# Patient Record
Sex: MALE | Race: WHITE | Employment: FULL TIME | URBAN - METROPOLITAN AREA
[De-identification: names, ages, dates, MRNs, and addresses within clinical notes are randomized per-mention and may not be internally consistent; named-entity substitution may affect disease eponyms.]

---

## 2017-01-21 DIAGNOSIS — E78.5 HYPERLIPIDEMIA, UNSPECIFIED HYPERLIPIDEMIA TYPE: ICD-10-CM

## 2017-01-21 RX ORDER — ATORVASTATIN CALCIUM 10 MG/1
TABLET, FILM COATED ORAL
Qty: 30 TAB | Refills: 3 | Status: SHIPPED | OUTPATIENT
Start: 2017-01-21 | End: 2017-04-10 | Stop reason: SDUPTHER

## 2017-02-01 ENCOUNTER — OFFICE VISIT (OUTPATIENT)
Dept: INTERNAL MEDICINE CLINIC | Age: 57
End: 2017-02-01

## 2017-02-01 VITALS
TEMPERATURE: 98.1 F | RESPIRATION RATE: 16 BRPM | HEIGHT: 68 IN | HEART RATE: 72 BPM | BODY MASS INDEX: 29.49 KG/M2 | DIASTOLIC BLOOD PRESSURE: 78 MMHG | OXYGEN SATURATION: 100 % | SYSTOLIC BLOOD PRESSURE: 120 MMHG | WEIGHT: 194.6 LBS

## 2017-02-01 DIAGNOSIS — Z11.3 SCREENING FOR STD (SEXUALLY TRANSMITTED DISEASE): ICD-10-CM

## 2017-02-01 DIAGNOSIS — E78.1 PURE HYPERGLYCERIDEMIA: Primary | ICD-10-CM

## 2017-02-01 DIAGNOSIS — L65.9 HAIR LOSS: ICD-10-CM

## 2017-02-01 DIAGNOSIS — L01.00 IMPETIGO: ICD-10-CM

## 2017-02-01 DIAGNOSIS — R21 RASH: ICD-10-CM

## 2017-02-01 DIAGNOSIS — R68.82 DECREASED LIBIDO: ICD-10-CM

## 2017-02-01 DIAGNOSIS — Z11.59 NEED FOR HEPATITIS C SCREENING TEST: ICD-10-CM

## 2017-02-01 DIAGNOSIS — F41.9 ANXIETY: ICD-10-CM

## 2017-02-01 RX ORDER — MUPIROCIN 20 MG/G
OINTMENT TOPICAL DAILY
Qty: 22 G | Refills: 0 | Status: SHIPPED | OUTPATIENT
Start: 2017-02-01 | End: 2017-08-09

## 2017-02-01 RX ORDER — TRIAMCINOLONE ACETONIDE 0.25 MG/G
CREAM TOPICAL 2 TIMES DAILY
Qty: 454 G | Refills: 0 | Status: SHIPPED | OUTPATIENT
Start: 2017-02-01 | End: 2017-02-11

## 2017-02-01 RX ORDER — FINASTERIDE 5 MG/1
TABLET, FILM COATED ORAL
Qty: 30 TAB | Refills: 1 | Status: SHIPPED | OUTPATIENT
Start: 2017-02-01 | End: 2017-08-09

## 2017-02-01 RX ORDER — SILDENAFIL 100 MG/1
100 TABLET, FILM COATED ORAL AS NEEDED
Qty: 8 TAB | Refills: 2 | Status: SHIPPED | OUTPATIENT
Start: 2017-02-01 | End: 2017-06-17 | Stop reason: SDUPTHER

## 2017-02-01 NOTE — PATIENT INSTRUCTIONS
It was a pleasure to see you! As discussed:    I have ordered your age appropriate labs please complete them. You will need to fast 10-12hrs before your appointment. Congratulations on your plans to start weight loss and positive lifestyle changes!!! Continue to work on optimizing your weight (goal lose at least 5% body weight), healthy eating and cardiovascular disease (Recommendation: reduce carbs to 150-200g/ day and the Mediterranean Diet). Mediterranean Diet: Care Instructions  Your Care Instructions  The Mediterranean diet features foods eaten in [place], [place], [place], and other countries that border the [facility]. It emphasizes eating a diet rich in fruits, vegetables, nuts, and high-fiber grains, and limits meat, cheese, and sweets. The Mediterranean diet may:  · Prevent heart disease and lower the risk of a heart attack or stroke. · Prevent type 2 diabetes. · Prevent Alzheimer's disease and other dementia. · Prevent depression. · Prevent Parkinson's disease. This diet contains more fat than other heart-healthy diets. But the fats are mainly from nuts, unsaturated oils, such as fish oils, olive oil, and certain nut or seed oils (such as canola, soybean, or flaxseed oil). These types of oils may help protect the heart and blood vessels. Follow-up care is a key part of your treatment and safety. Be sure to make and go to all appointments, and call your doctor if you are having problems. It's also a good idea to know your test results and keep a list of the medicines you take. How can you care for yourself at home? What to eat  · Eat a variety of fruits and vegetables each day, such as grapes, blueberries, tomatoes, broccoli, peppers, figs, olives, spinach, eggplant, beans, lentils, and chickpeas. · Eat a variety of whole-grain foods each day, such as oats, brown rice, and whole wheat bread, pasta, and couscous. · Eat fish at least 2 times a week.  Try tuna, salmon, mackerel, lake trout, herring, or sardines. · Eat moderate amounts of low-fat dairy products each day or weekly, such as milk, cheese, or yogurt. · Eat moderate amounts of poultry and eggs every 2 days or weekly. · Choose healthy (unsaturated) fats, such as nuts, olive oil and certain nut or seed oils like canola, soybean, and flaxseed. · Limit unhealthy (saturated) fats, such as butter, palm oil, and coconut oil. And limit fats found in animal products, such as meat and dairy products made with whole milk. Try to eat red meat only a few times a month in very small amounts. · Limit sweets and desserts to only a few times a week. This includes sugar-sweetened drinks like soda. The Mediterranean diet may also include red wine with your meal--1 glass each day for women and up to 2 glasses a day for men. Tips for changing your diet  · Dip bread in a mix of olive oil and fresh herbs instead of using butter. · Add avocado slices to your sandwich instead of seth. · Have fish for lunch or dinner instead of red meat. Brush the fish with olive oil, and broil or grill it. · Sprinkle your salad with seeds or nuts instead of cheese. · Cook with olive or canola oil instead of butter or oils that are high in saturated fat. · Switch from 2% milk or whole milk to 1% or fat-free milk. · Dip raw vegetables in a vinaigrette dressing or hummus instead of dips made from mayonnaise or sour cream.  · Have a piece of fruit for dessert instead of a piece of cake. Try baked apples, or have some dried fruit. Part of the Mediterranean diet is being active. Get at least 30 minutes of exercise on most days of the week. Walking is a good choice. You also may want to do other activities, such as running, swimming, cycling, or playing tennis or team sports. Where can you learn more? Go to http://sami-alex.info/.   Enter O407 in the search box to learn more about \"Mediterranean Diet: Care Instructions. \"  Current as of: July 26, 2016  Content Version: 11.1  © 9899-5296 [employer], [facility]. Care instructions adapted under license by [employer] (which disclaims liability or warranty for this information). If you have questions about a medical condition or this instruction, always ask your healthcare professional. [place] [address] any warranty or liability for your use of this information.

## 2017-02-01 NOTE — PROGRESS NOTES
HISTORY OF PRESENT ILLNESS  Walter Edwards is a 64 y.o. male. HPI  Cardiovascular Review:  The patient has hyperlipidemia. Diet and Lifestyle: generally follows a low fat low cholesterol diet, exercises regularly, nonsmoker  Home BP Monitoring: is not measured at home. Pertinent ROS: taking medications as instructed, no medication side effects noted, no TIA's, no chest pain on exertion, no dyspnea on exertion, no swelling of ankles. Low Libido   Reports several years of decreased libido. Hair loss on legs. Denies s/s of claudications   PHQ 2 / 9, over the last two weeks 2/1/2017   Little interest or pleasure in doing things Several days   Feeling down, depressed or hopeless Several days   Total Score PHQ 2 2       Review of Systems   Constitutional: Negative for diaphoresis, fever and weight loss. Eyes: Negative for blurred vision and pain. Respiratory: Negative for shortness of breath. Cardiovascular: Negative for chest pain, orthopnea and leg swelling. Genitourinary:        New sexual partner would like HIV check    Skin: Positive for rash (pruritic on trunk ). Neurological: Negative for focal weakness and headaches. Psychiatric/Behavioral: Negative for depression. Patient Active Problem List    Diagnosis Date Noted    NEETU (obstructive sleep apnea) 03/29/2016    Anxiety 12/16/2014    HLD (hyperlipidemia) 12/16/2014    GERD (gastroesophageal reflux disease) 12/16/2014    PND (post-nasal drip) 12/16/2014       Current Outpatient Prescriptions   Medication Sig Dispense Refill    sildenafil citrate (VIAGRA) 100 mg tablet Take 1 Tab by mouth as needed. 8 Tab 2    finasteride (PROSCAR) 5 mg tablet 1/4 pill daily 30 Tab 1    triamcinolone acetonide (KENALOG) 0.025 % topical cream Apply  to affected area two (2) times a day for 10 days. use thin layer 454 g 0    mupirocin (BACTROBAN) 2 % ointment Apply  to affected area daily.  22 g 0    atorvastatin (LIPITOR) 10 mg tablet TAKE 1 TAB BY MOUTH NIGHTLY. 30 Tab 3    venlafaxine-SR (EFFEXOR-XR) 75 mg capsule TAKE 1 CAPSULES BY MOUTH EVERY DAY 90 Cap 2    omeprazole (PRILOSEC) 40 mg capsule Take 40 mg by mouth daily.  cetirizine (ZYRTEC) 10 mg tablet Take 10 mg by mouth daily.  albuterol (PROVENTIL HFA, VENTOLIN HFA, PROAIR HFA) 90 mcg/actuation inhaler Take 1 Puff by inhalation every six (6) hours as needed for Wheezing. 1 Inhaler 4       Allergies   Allergen Reactions    Morphine Itching    Pcn [Penicillins] Itching      Visit Vitals    /78 (BP 1 Location: Right arm, BP Patient Position: Sitting)    Pulse 72    Temp 98.1 °F (36.7 °C) (Oral)    Resp 16    Ht 5' 8\" (1.727 m)    Wt 194 lb 9.6 oz (88.3 kg)    SpO2 100%    BMI 29.59 kg/m2       Physical Exam   Constitutional: He is oriented to person, place, and time. No distress. HENT:   Right Ear: Tympanic membrane, external ear and ear canal normal.   Left Ear: Tympanic membrane, external ear and ear canal normal.   Nose: Mucosal edema and rhinorrhea present. Mouth/Throat: Posterior oropharyngeal erythema present. No oropharyngeal exudate. Cardiovascular: Normal rate, regular rhythm and normal heart sounds. Exam reveals no friction rub. No murmur heard. Pulses:       Posterior tibial pulses are 2+ on the right side, and 2+ on the left side. Pulmonary/Chest: Breath sounds normal. No accessory muscle usage. No tachypnea and no bradypnea. No respiratory distress. He has no decreased breath sounds. He has no wheezes. He has no rhonchi. He has no rales. Musculoskeletal: He exhibits no edema. Neurological: He is alert and oriented to person, place, and time. Skin: He is not diaphoretic. ASSESSMENT and PLAN  Pat Kirk was seen today for hypertension. Diagnoses and all orders for this visit:    Pure hyperglyceridemia- check Lipid panel    Anxiety- stable. Continue current dose of Effexor    Rash- contact dermatitis.  Trial of steroid cream and avoidance -     triamcinolone acetonide (KENALOG) 0.025 % topical cream; Apply  to affected area two (2) times a day for 10 days. use thin layer    Screening for STD (sexually transmitted disease)- per request  -     HIV 1/2 AG/AB, 4TH GENERATION,W RFLX CONFIRM  -     RPR    Impetigo- topical ointment.   -     mupirocin (BACTROBAN) 2 % ointment; Apply  to affected area daily. Need for hepatitis C screening test  -     HEPATITIS C AB    Decreased libido- check Ttrone   -     sildenafil citrate (VIAGRA) 100 mg tablet; Take 1 Tab by mouth as needed. -     finasteride (PROSCAR) 5 mg tablet; 1/4 pill daily  -     TESTOSTERONE, FREE & TOTAL    Hair loss- no e/o PVD. Likely genetic.   -     finasteride (PROSCAR) 5 mg tablet; 1/4 pill daily  -     TESTOSTERONE, FREE & TOTAL      Follow-up Disposition:  Return in about 4 months (around 6/1/2017) for Physical - 30 minute appointment. Medication risks/benefits/costs/interactions/alternatives discussed with patient. Obiefabiana Rodriguez  was given an after visit summary which includes diagnoses, current medications, & vitals. he expressed understanding with the diagnosis and plan.

## 2017-02-02 LAB
CHOLEST SERPL-MCNC: 137 MG/DL (ref 100–199)
COMMENT: ABNORMAL
HCV AB S/CO SERPL IA: <0.1 S/CO RATIO (ref 0–0.9)
HDLC SERPL-MCNC: 25 MG/DL
HIV 1+2 AB+HIV1 P24 AG SERPL QL IA: NON REACTIVE
LDLC SERPL CALC-MCNC: ABNORMAL MG/DL (ref 0–99)
PSA SERPL-MCNC: 1.7 NG/ML (ref 0–4)
REFLEX CRITERIA: NORMAL
RPR SER QL: NON REACTIVE
TESTOST FREE SERPL-MCNC: 8.8 PG/ML (ref 7.2–24)
TESTOST SERPL-MCNC: 261 NG/DL (ref 348–1197)
TRIGL SERPL-MCNC: 421 MG/DL (ref 0–149)
VLDLC SERPL CALC-MCNC: ABNORMAL MG/DL (ref 5–40)

## 2017-02-03 ENCOUNTER — TELEPHONE (OUTPATIENT)
Dept: INTERNAL MEDICINE CLINIC | Age: 57
End: 2017-02-03

## 2017-02-03 DIAGNOSIS — R68.82 DECREASED LIBIDO: ICD-10-CM

## 2017-02-03 DIAGNOSIS — L65.9 HAIR LOSS: ICD-10-CM

## 2017-02-03 RX ORDER — SILDENAFIL 100 MG/1
100 TABLET, FILM COATED ORAL AS NEEDED
Qty: 8 TAB | Refills: 2 | Status: CANCELLED | OUTPATIENT
Start: 2017-02-03

## 2017-02-03 RX ORDER — FINASTERIDE 5 MG/1
TABLET, FILM COATED ORAL
Qty: 30 TAB | Refills: 1 | Status: CANCELLED | OUTPATIENT
Start: 2017-02-03

## 2017-02-03 NOTE — PROGRESS NOTES
Please let Heidy Black know  STD testing is normal.   His triglycerides are still high. I recommend increase the dose of Lipitor to 20mg we will recheck the level in 4 months. His testosterone is low. Diagnosis requires two abnormal readings before we can start testosterone therapy. I have ordered another lab slip how would Heidy Black like to receive it?   The remainder of your labs were normal. Some labs that may have been tested and their explanation are:  Your electrolytes, kidney & liver function (Metabolic Panel)   Anemia, blood cells (CBC)  Thyroid (TSH + T4, T3)  Hormones (prolactin, vitamin D )   Diabetes (Hemoglobin A1c)   PSA (Prostate Health)

## 2017-02-06 DIAGNOSIS — R68.82 DECREASED LIBIDO: Primary | ICD-10-CM

## 2017-02-06 NOTE — TELEPHONE ENCOUNTER
Verified pt received my-chart message, lab slip mailed for testosterone re-check. Will have done soon .  Pt will double up on Lipitor 10 until runs out then let us know when ready for 20 mg Lipitor rx to be sent to pharmacy

## 2017-02-18 LAB
COMMENT: ABNORMAL
TESTOST FREE SERPL-MCNC: 9.7 PG/ML (ref 7.2–24)
TESTOST SERPL-MCNC: 318 NG/DL (ref 348–1197)

## 2017-02-21 ENCOUNTER — TELEPHONE (OUTPATIENT)
Dept: INTERNAL MEDICINE CLINIC | Age: 57
End: 2017-02-21

## 2017-02-21 DIAGNOSIS — R79.89 LOW TESTOSTERONE: ICD-10-CM

## 2017-02-21 DIAGNOSIS — R68.82 DECREASED LIBIDO: Primary | ICD-10-CM

## 2017-02-21 DIAGNOSIS — R68.82 DECREASED LIBIDO: ICD-10-CM

## 2017-02-21 DIAGNOSIS — R79.89 LOW TESTOSTERONE: Primary | ICD-10-CM

## 2017-02-21 NOTE — PROGRESS NOTES
Please call Sadia Gomez and let him know I recommend he go to Dr. Praveen Willams. My Chart message sent with details. Hi . Almaz Velazquez,   Thanks for completing your labs. Your testosterone is still low. I recommend you see   Dr. Vargas Later at [facility] for evaluation. He is an expert in sexual health and has helped many of my patients. You can call to schedule and I will make an appointment.  [phone]   [phone]  Do not hesitate to contact the office if you have any questions or concerns before your next appointment.    Kind regards,   Dr. Carisa Craig

## 2017-02-21 NOTE — TELEPHONE ENCOUNTER
----- Message from Colten Whitaker MD sent at 2/21/2017  8:28 AM EST -----  Please call Ever Tesfaye and let him know I recommend he go to Dr. Dorys Engel. My Chart message sent with details. Hi Mr. Houston Amaya,   Thanks for completing your labs. Your testosterone is still low. I recommend you see   Dr. Justin Arboleda at [address] [facility] for evaluation. He is an expert in sexual health and has helped many of my patients. You can call to schedule and I will make an appointment.  [phone]   [phone]  Do not hesitate to contact the office if you have any questions or concerns before your next appointment.    Kind regards,   Dr. Mumtaz Sales

## 2017-02-21 NOTE — TELEPHONE ENCOUNTER
Informed patient testosterone still low. Referral to [facility]. [employer] message sent, with office # to call and schedule appointment. Mailing referral today.

## 2017-03-27 ENCOUNTER — HOSPITAL ENCOUNTER (OUTPATIENT)
Dept: CT IMAGING | Age: 57
Discharge: HOME | End: 2017-03-27
Attending: INTERNAL MEDICINE
Payer: SELF-PAY

## 2017-03-27 DIAGNOSIS — Z00.00 PREVENTATIVE HEALTH CARE: ICD-10-CM

## 2017-03-27 PROCEDURE — 75571 CT HRT W/O DYE W/CA TEST: CPT

## 2017-03-28 NOTE — CARDIO/PULMONARY
Reached patient at his given mobile number and shared his coronary artery CT score of 332 with him. Discussed the meaning of this score and recommended that he follow up with his PCP. Patient has no further questions at this time. Patient will follow up with his PCP, Dr. Bakari Thorpe.

## 2017-04-05 DIAGNOSIS — I25.10 CORONARY ARTERY DISEASE INVOLVING NATIVE CORONARY ARTERY OF NATIVE HEART WITHOUT ANGINA PECTORIS: Primary | ICD-10-CM

## 2017-04-06 ENCOUNTER — DOCUMENTATION ONLY (OUTPATIENT)
Dept: INTERNAL MEDICINE CLINIC | Age: 57
End: 2017-04-06

## 2017-04-10 DIAGNOSIS — E78.5 HYPERLIPIDEMIA, UNSPECIFIED HYPERLIPIDEMIA TYPE: ICD-10-CM

## 2017-04-12 RX ORDER — ATORVASTATIN CALCIUM 10 MG/1
10 TABLET, FILM COATED ORAL DAILY
Qty: 30 TAB | Refills: 3 | Status: SHIPPED | OUTPATIENT
Start: 2017-04-12 | End: 2017-05-15 | Stop reason: DRUGHIGH

## 2017-05-15 ENCOUNTER — OFFICE VISIT (OUTPATIENT)
Dept: INTERNAL MEDICINE CLINIC | Age: 57
End: 2017-05-15

## 2017-05-15 VITALS
HEIGHT: 68 IN | TEMPERATURE: 97.8 F | HEART RATE: 76 BPM | WEIGHT: 194.2 LBS | BODY MASS INDEX: 29.43 KG/M2 | SYSTOLIC BLOOD PRESSURE: 114 MMHG | RESPIRATION RATE: 18 BRPM | OXYGEN SATURATION: 98 % | DIASTOLIC BLOOD PRESSURE: 76 MMHG

## 2017-05-15 DIAGNOSIS — M54.50 ACUTE LEFT-SIDED LOW BACK PAIN WITHOUT SCIATICA: Primary | ICD-10-CM

## 2017-05-15 DIAGNOSIS — E78.1 PURE HYPERGLYCERIDEMIA: ICD-10-CM
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
## 2017-05-15 RX ORDER — HYDROCODONE BITARTRATE AND ACETAMINOPHEN 5; 325 MG/1; MG/1
1 TABLET ORAL
Qty: 10 TAB | Refills: 0 | Status: SHIPPED | OUTPATIENT
Start: 2017-05-15 | End: 2017-06-28

## 2017-05-15 RX ORDER — ATORVASTATIN CALCIUM 20 MG/1
20 TABLET, FILM COATED ORAL DAILY
Qty: 30 TAB | Refills: 1 | Status: SHIPPED | OUTPATIENT
Start: 2017-05-15 | End: 2017-09-26 | Stop reason: SDUPTHER

## 2017-05-15 RX ORDER — METHYLPREDNISOLONE 4 MG/1
TABLET ORAL
Qty: 1 DOSE PACK | Refills: 0 | Status: SHIPPED | OUTPATIENT
Start: 2017-05-15 | End: 2017-06-28

## 2017-05-15 RX ORDER — CYCLOBENZAPRINE HCL 5 MG
TABLET ORAL
Qty: 30 TAB | Refills: 0 | Status: SHIPPED | OUTPATIENT
Start: 2017-05-15 | End: 2017-06-28

## 2017-05-15 RX ORDER — ATORVASTATIN CALCIUM 20 MG/1
20 TABLET, FILM COATED ORAL DAILY
COMMUNITY
End: 2017-05-15 | Stop reason: SDUPTHER

## 2017-05-15 NOTE — PATIENT INSTRUCTIONS
It was a pleasure to see you! As discussed:      Back Pain  On exam you have muscle spasms and contusion in the low back. Use Ice pack at affected area 15-20mins, 3-4x/day then heat pad is okay. Use Flexeril at night, first dose, for spasm relieve  Take Medrol dose pack** as directed. Do not use any other NSAIDs while on this medication**. Tylenol, can be used for breakthrough pain while taking Naproxen. Your pain should take 4-6 weeks to return and will gradually improve. If your pain is not resolved in 4-6 weeks, go to [facility]/ PT for further evaluation and treatment. Notify Dr. Zane Colby  In the interm:   If your pain worsens, you experience leg weakness or numbness, loose bowel, difficulty urinating, fevers or chills---> seek immediate medical attention. **You can use Ibuprofen (no more than 2400 mg/day) or Aleve (no more than 880mg/ day) as needed. Do not use any other NSAIDs while on this medication. Do not use NSAIDs if you have high blood pressure or history of ulcers or abnormal bleeding. Back Pain: Care Instructions  Your Care Instructions    Back pain has many possible causes. It is often related to problems with muscles and ligaments of the back. It may also be related to problems with the nerves, discs, or bones of the back. Moving, lifting, standing, sitting, or sleeping in an awkward way can strain the back. Sometimes you don't notice the injury until later. Arthritis is another common cause of back pain. Although it may hurt a lot, back pain usually improves on its own within several weeks. Most people recover in 12 weeks or less. Using good home treatment and being careful not to stress your back can help you feel better sooner. Follow-up care is a key part of your treatment and safety. Be sure to make and go to all appointments, and call your doctor if you are having problems.  Its also a good idea to know your test results and keep a list of the medicines you take.  How can you care for yourself at home? · Sit or lie in positions that are most comfortable and reduce your pain. Try one of these positions when you lie down:  ¨ Lie on your back with your knees bent and supported by large pillows. ¨ Lie on the floor with your legs on the seat of a sofa or chair. [place] on your side with your knees and hips bent and a pillow between your legs. ¨ Lie on your stomach if it does not make pain worse. · Do not sit up in bed, and avoid soft couches and twisted positions. Bed rest can help relieve pain at first, but it delays healing. Avoid bed rest after the first day of back pain. · Change positions every 30 minutes. If you must sit for long periods of time, take breaks from sitting. Get up and walk around, or lie in a comfortable position. · Try using a heating pad on a low or medium setting for 15 to 20 minutes every 2 or 3 hours. Try a warm shower in place of one session with the heating pad. · You can also try an ice pack for 10 to 15 minutes every 2 to 3 hours. Put a thin cloth between the ice pack and your skin. · Take pain medicines exactly as directed. ¨ If the doctor gave you a prescription medicine for pain, take it as prescribed. ¨ If you are not taking a prescription pain medicine, ask your doctor if you can take an over-the-counter medicine. · Take short walks several times a day. You can start with 5 to 10 minutes, 3 or 4 times a day, and work up to longer walks. Walk on level surfaces and avoid hills and stairs until your back is better. · Return to work and other activities as soon as you can. Continued rest without activity is usually not good for your back. · To prevent future back pain, do exercises to stretch and strengthen your back and stomach. Learn how to use good posture, safe lifting techniques, and proper body mechanics. When should you call for help?   Call your doctor now or seek immediate medical care if:  · You have new or worsening numbness in your legs. · You have new or worsening weakness in your legs. (This could make it hard to stand up.)  · You lose control of your bladder or bowels. Watch closely for changes in your health, and be sure to contact your doctor if:  · Your pain gets worse. · You are not getting better after 2 weeks. Where can you learn more? Go to http://sami-alex.info/. Enter W792 in the search box to learn more about \"Back Pain: Care Instructions. \"  Current as of: May 23, 2016  Content Version: 11.2  © 0993-1859 [employer]. Care instructions adapted under license by [employer] (which disclaims liability or warranty for this information). If you have questions about a medical condition or this instruction, always ask your healthcare professional. [address] any warranty or liability for your use of this information. Back Stretches: Exercises  Your Care Instructions  Here are some examples of exercises for stretching your back. Start each exercise slowly. Ease off the exercise if you start to have pain. Your doctor or physical therapist will tell you when you can start these exercises and which ones will work best for you. How to do the exercises  Overhead stretch    1. Stand comfortably with your feet shoulder-width apart. 2. Looking straight ahead, raise both arms over your head and reach toward the ceiling. Do not allow your head to tilt back. 3. Hold for 15 to 30 seconds, then lower your arms to your sides. 4. Repeat 2 to 4 times. Side stretch    1. Stand comfortably with your feet shoulder-width apart. 2. Raise one arm over your head, and then lean to the other side. 3. Slide your hand down your leg as you let the weight of your arm gently stretch your side muscles. Hold for 15 to 30 seconds. 4. Repeat 2 to 4 times on each side. Press-up    1. Lie on your stomach, supporting your body with your forearms.   2. Press your elbows down into the floor to raise your upper back. As you do this, relax your stomach muscles and allow your back to arch without using your back muscles. As your press up, do not let your hips or pelvis come off the floor. 3. Hold for 15 to 30 seconds, then relax. 4. Repeat 2 to 4 times. Relax and rest    1. Lie on your back with a rolled towel under your neck and a pillow under your knees. Extend your arms comfortably to your sides. 2. Relax and breathe normally. 3. Remain in this position for about 10 minutes. 4. If you can, do this 2 or 3 times each day. Follow-up care is a key part of your treatment and safety. Be sure to make and go to all appointments, and call your doctor if you are having problems. It's also a good idea to know your test results and keep a list of the medicines you take. Where can you learn more? Go to http://sami-alex.info/. Enter Y734 in the search box to learn more about \"Back Stretches: Exercises. \"  Current as of: May 23, 2016  Content Version: 11.2  © 8636-6327 [employer]. Care instructions adapted under license by [employer] (which disclaims liability or warranty for this information). If you have questions about a medical condition or this instruction, always ask your healthcare professional. [address] any warranty or liability for your use of this information.

## 2017-05-15 NOTE — PROGRESS NOTES
HISTORY OF PRESENT ILLNESS  Shiv De La Torre is a 64 y.o. male. Fall   The accident occurred 2 days ago. The fall occurred while standing. He landed on hard floor. Point of impact: left back  Pain location: left back  The pain is at a severity of 10/10 (positional). The pain is severe. There was no entrapment after the fall. There was no drug use involved in the accident. There was no alcohol use involved in the accident. Pertinent negatives include no fever, no numbness, no abdominal pain, no bowel incontinence, no hematuria, no extremity weakness, no loss of consciousness and no tingling. The risk factors include none. Exacerbated by: certain positions  Treatments tried: ibuprofen and one hydrocodone  The patient's last tetanus shot was less than 5 years ago. Health Maintenance   Topic Date Due    DTaP/Tdap/Td series (1 - Tdap) 09/08/1981    FOBT Q 1 YEAR AGE 50-75  04/19/2017    INFLUENZA AGE 9 TO ADULT  08/01/2017    Hepatitis C Screening  Completed         Review of Systems   Constitutional: Negative for fever. Gastrointestinal: Negative for abdominal pain and bowel incontinence. Genitourinary: Negative for hematuria. Musculoskeletal: Negative for extremity weakness. Neurological: Negative for tingling, loss of consciousness and numbness. Patient Active Problem List    Diagnosis Date Noted    NEETU (obstructive sleep apnea) 03/29/2016    Anxiety 12/16/2014    HLD (hyperlipidemia) 12/16/2014    GERD (gastroesophageal reflux disease) 12/16/2014    PND (post-nasal drip) 12/16/2014       Current Outpatient Prescriptions   Medication Sig Dispense Refill    atorvastatin (LIPITOR) 20 mg tablet Take 1 Tab by mouth daily.  30 Tab 1    methylPREDNISolone (MEDROL DOSEPACK) 4 mg tablet As directed 1 Dose Pack 0    cyclobenzaprine (FLEXERIL) 5 mg tablet Take first dose at night to see if it makes you sleepy if tolerated take every 8 hours as needed for back spasms 30 Tab 0    HYDROcodone-acetaminophen (NORCO) 5-325 mg per tablet Take 1 Tab by mouth every eight (8) hours as needed for Pain (severe 10/10). Max Daily Amount: 3 Tabs. 10 Tab 0    sildenafil citrate (VIAGRA) 100 mg tablet Take 1 Tab by mouth as needed. 8 Tab 2    venlafaxine-SR (EFFEXOR-XR) 75 mg capsule TAKE 1 CAPSULES BY MOUTH EVERY DAY 90 Cap 2    omeprazole (PRILOSEC) 40 mg capsule Take 40 mg by mouth daily.  cetirizine (ZYRTEC) 10 mg tablet Take 10 mg by mouth daily.  finasteride (PROSCAR) 5 mg tablet 1/4 pill daily 30 Tab 1    mupirocin (BACTROBAN) 2 % ointment Apply  to affected area daily. 22 g 0    albuterol (PROVENTIL HFA, VENTOLIN HFA, PROAIR HFA) 90 mcg/actuation inhaler Take 1 Puff by inhalation every six (6) hours as needed for Wheezing. 1 Inhaler 4       Allergies   Allergen Reactions    Morphine Itching    Pcn [Penicillins] Itching      Visit Vitals    /76 (BP 1 Location: Right arm, BP Patient Position: Sitting)    Pulse 76    Temp 97.8 °F (36.6 °C) (Oral)    Resp 18    Ht 5' 8\" (1.727 m)    Wt 194 lb 3.2 oz (88.1 kg)    SpO2 98%    BMI 29.53 kg/m2       Physical Exam   Constitutional: He is oriented to person, place, and time. No distress. Musculoskeletal: He exhibits no edema (Ble ). Thoracic back: He exhibits tenderness. He exhibits no bony tenderness. Lumbar back: He exhibits tenderness. He exhibits no bony tenderness. Back:    BLE: Sensation intact, 5/5 strength    Neurological: He is alert and oriented to person, place, and time. Psychiatric: He has a normal mood and affect. ASSESSMENT and PLAN  Colletta Covey was seen today for fall . Diagnoses and all orders for this visit:    Acute left-sided low back pain without sciatica- [facility]. [facility]  Red flags to warrant ER or earlier clinical evaluation reviewed. See AVS for full details of plan and patient discussion.     -     methylPREDNISolone (MEDROL DOSEPACK) 4 mg tablet;  As directed  - cyclobenzaprine (FLEXERIL) 5 mg tablet; Take first dose at night to see if it makes you sleepy if tolerated take every 8 hours as needed for back spasms  -     HYDROcodone-acetaminophen (NORCO) 5-325 mg per tablet; Take 1 Tab by mouth every eight (8) hours as needed for Pain (severe 10/10). Max Daily Amount: 3 Tabs. Pure hyperglyceridemia  -     atorvastatin (LIPITOR) 20 mg tablet; Take 1 Tab by mouth daily. Fall, initial encounter    Other orders  -     Cancel: XR SPINE LUMB 2 OR 3 V; Future      Follow-up Disposition:  Return if symptoms worsen or fail to improve within 4- 6 weeks. Medication risks/benefits/costs/interactions/alternatives discussed with patient. Brigido Pino  was given an after visit summary which includes diagnoses, current medications, & vitals. he expressed understanding with the diagnosis and plan.

## 2017-05-15 NOTE — PROGRESS NOTES
Chief Complaint   Patient presents with    Fall     1. Have you been to the ER, urgent care clinic since your last visit? Hospitalized since your last visit? No    2. Have you seen or consulted any other health care providers outside of the [address] [place] [address] since your last visit? Include any pap smears or colon screening. Yes When: 4/2017 Where: Urologist Reason for visit: decrease Testosterone/started taking Testosterone injections      Patient states fell on Friday, landed on back. Lower left side, painful. Taking IB profen.

## 2017-06-17 DIAGNOSIS — R68.82 DECREASED LIBIDO: ICD-10-CM

## 2017-06-22 RX ORDER — SILDENAFIL 100 MG/1
100 TABLET, FILM COATED ORAL AS NEEDED
Qty: 8 TAB | Refills: 2 | Status: SHIPPED | OUTPATIENT
Start: 2017-06-22

## 2017-06-22 NOTE — TELEPHONE ENCOUNTER
From: Alonzo Philippe  To: Kelsea Cervantes MD  Sent: 6/17/2017 3:04 PM EDT  Subject: Medication Renewal Request    Original authorizing provider: MD Alonzo Mcleod would like a refill of the following medications:  sildenafil citrate (VIAGRA) 100 mg tablet Kelsea Cervantes MD]    Preferred pharmacy: [facility] #[id number] [address]    Comment:

## 2017-06-26 ENCOUNTER — OFFICE VISIT (OUTPATIENT)
Dept: INTERNAL MEDICINE CLINIC | Age: 57
End: 2017-06-26

## 2017-06-26 VITALS
OXYGEN SATURATION: 98 % | RESPIRATION RATE: 18 BRPM | TEMPERATURE: 98.1 F | WEIGHT: 193.6 LBS | HEART RATE: 81 BPM | SYSTOLIC BLOOD PRESSURE: 126 MMHG | HEIGHT: 68 IN | BODY MASS INDEX: 29.34 KG/M2 | DIASTOLIC BLOOD PRESSURE: 80 MMHG

## 2017-06-26 DIAGNOSIS — M10.472 OTHER SECONDARY ACUTE GOUT OF LEFT FOOT: Primary | ICD-10-CM

## 2017-06-26 DIAGNOSIS — L03.116 CELLULITIS OF LEFT LOWER EXTREMITY: ICD-10-CM

## 2017-06-26 DIAGNOSIS — M79.672 LEFT FOOT PAIN: ICD-10-CM

## 2017-06-26 RX ORDER — COLCHICINE 0.6 MG/1
TABLET ORAL
Qty: 3 TAB | Refills: 0 | Status: SHIPPED | OUTPATIENT
Start: 2017-06-26 | End: 2017-06-28

## 2017-06-26 RX ORDER — DOXYCYCLINE 100 MG/1
100 TABLET ORAL 2 TIMES DAILY
Qty: 14 TAB | Refills: 0 | Status: SHIPPED | OUTPATIENT
Start: 2017-06-26 | End: 2017-07-03

## 2017-06-26 NOTE — PROGRESS NOTES
HISTORY OF PRESENT ILLNESS  Amber Mojica is a 64 y.o. male. Foot Injury    The current episode started more than 2 days ago (6/21/17, stepped on object underwater while snorkeling ). The problem has been gradually worsening. The pain is present in the left foot. The quality of the pain is described as aching. The pain is at a severity of 10/10. Associated symptoms include stiffness. Pertinent negatives include full range of motion. Associated symptoms comments: Swelling , redness . There has been a history of trauma. H/o gout. Was on a cruise admits to increased seafood and alcohol intake. Review of Systems   Constitutional: Negative for chills and fever. Eyes: Negative for blurred vision. Musculoskeletal: Positive for stiffness. Patient Active Problem List    Diagnosis Date Noted    NEETU (obstructive sleep apnea) 03/29/2016    Anxiety 12/16/2014    HLD (hyperlipidemia) 12/16/2014    GERD (gastroesophageal reflux disease) 12/16/2014    PND (post-nasal drip) 12/16/2014       Current Outpatient Prescriptions   Medication Sig Dispense Refill    colchicine 0.6 mg tablet Take 2 tabs by mouth once then 1 hour later take 1 tab by mouth 3 Tab 0    doxycycline (ADOXA) 100 mg tablet Take 1 Tab by mouth two (2) times a day for 7 days. 14 Tab 0    sildenafil citrate (VIAGRA) 100 mg tablet Take 1 Tab by mouth as needed. 8 Tab 2    atorvastatin (LIPITOR) 20 mg tablet Take 1 Tab by mouth daily. 30 Tab 1    cyclobenzaprine (FLEXERIL) 5 mg tablet Take first dose at night to see if it makes you sleepy if tolerated take every 8 hours as needed for back spasms 30 Tab 0    finasteride (PROSCAR) 5 mg tablet 1/4 pill daily 30 Tab 1    mupirocin (BACTROBAN) 2 % ointment Apply  to affected area daily.  22 g 0    venlafaxine-SR (EFFEXOR-XR) 75 mg capsule TAKE 1 CAPSULES BY MOUTH EVERY DAY 90 Cap 2    albuterol (PROVENTIL HFA, VENTOLIN HFA, PROAIR HFA) 90 mcg/actuation inhaler Take 1 Puff by inhalation every six (6) hours as needed for Wheezing. 1 Inhaler 4    omeprazole (PRILOSEC) 40 mg capsule Take 40 mg by mouth daily.  cetirizine (ZYRTEC) 10 mg tablet Take 10 mg by mouth daily.  methylPREDNISolone (MEDROL DOSEPACK) 4 mg tablet As directed 1 Dose Pack 0    HYDROcodone-acetaminophen (NORCO) 5-325 mg per tablet Take 1 Tab by mouth every eight (8) hours as needed for Pain (severe 10/10). Max Daily Amount: 3 Tabs. 10 Tab 0       Allergies   Allergen Reactions    Morphine Itching    Pcn [Penicillins] Itching      Visit Vitals    /80 (BP 1 Location: Right arm, BP Patient Position: Sitting)    Pulse 81    Temp 98.1 °F (36.7 °C) (Oral)    Resp 18    Ht 5' 8\" (1.727 m)    Wt 193 lb 9.6 oz (87.8 kg)    SpO2 98%    BMI 29.44 kg/m2         Physical Exam   Constitutional: He is oriented to person, place, and time. No distress. Neurological: He is alert and oriented to person, place, and time. Psychiatric: He has a normal mood and affect. LLE foot: 1st toe ttp decreased ROM. Dorsum with erythema and mild edema and ttp. Plantar surface punctate wound without purulent drainage or induration    ASSESSMENT and Almaz Rosario was seen today for foot injury. He has also has s/s that could be recurrent gout. Will treat for both cellulitis and gout. Xray ordered to help with diagnostic clarity. Red flags to warrant ER or earlier clinical evaluation reviewed. See AVS for full details of plan and patient discussion. Diagnoses and all orders for this visit:    Other secondary acute gout of left foot-     Left foot pain  -     colchicine 0.6 mg tablet; Take 2 tabs by mouth once then 1 hour later take 1 tab by mouth    Cellulitis of left lower extremity  -     doxycycline (ADOXA) 100 mg tablet; Take 1 Tab by mouth two (2) times a day for 7 days. -     XR FOOT LT MIN 3 V; Future      Follow-up Disposition:  Return if symptoms worsen or fail to improve with 7 days.     Medication risks/benefits/costs/interactions/alternatives discussed with patient. Rosado Denae  was given an after visit summary which includes diagnoses, current medications, & vitals. he expressed understanding with the diagnosis and plan.

## 2017-06-26 NOTE — PROGRESS NOTES
Chief Complaint   Patient presents with    Foot Injury     1. Have you been to the ER, urgent care clinic since your last visit? Hospitalized since your last visit? No      2. Have you seen or consulted any other health care providers outside of the [address] [place] [address] since your last visit? Include any pap smears or colon screening.  Yes When: 4/2017 Where: Urologist Reason for visit: Testosterone Inj      Snorkeling, stepped on something, swollen, sore,

## 2017-06-26 NOTE — PATIENT INSTRUCTIONS
It was a pleasure to see you! As discussed:    Complete the xray to help check for gout vs infection    I will notify of next steps based on the information received. In the meantime please  Take the medication prescribed for gout and infection. Also follow the recommendations listed below      Foot Pain: Care Instructions  Your Care Instructions  Foot injuries that cause pain and swelling are fairly common. Almost all sports or home repair projects can cause a misstep that ends up as foot pain. Normal wear and tear, especially as you get older, also can cause foot pain. Most minor foot injuries will heal on their own, and home treatment is usually all you need to do. If you have a severe injury, you may need tests and treatment. Follow-up care is a key part of your treatment and safety. Be sure to make and go to all appointments, and call your doctor if you are having problems. Its also a good idea to know your test results and keep a list of the medicines you take. How can you care for yourself at home? · Take pain medicines exactly as directed. ¨ If the doctor gave you a prescription medicine for pain, take it as prescribed. ¨ If you are not taking a prescription pain medicine, ask your doctor if you can take an over-the-counter medicine. · Rest and protect your foot. Take a break from any activity that may cause pain. · Put ice or a cold pack on your foot for 10 to 20 minutes at a time. Put a thin cloth between the ice and your skin. · Prop up the sore foot on a pillow when you ice it or anytime you sit or lie down during the next 3 days. Try to keep it above the level of your heart. This will help reduce swelling. · Your doctor may recommend that you wrap your foot with an elastic bandage. Keep your foot wrapped for as long as your doctor advises. · If your doctor recommends crutches, use them as directed. · Wear roomy footwear.   · As soon as pain and swelling end, begin gentle exercises of your foot. Your doctor can tell you which exercises will help. When should you call for help? Call 911 anytime you think you may need emergency care. For example, call if:  · Your foot turns pale, white, blue, or cold. Call your doctor now or seek immediate medical care if:  · You cannot move or stand on your foot. · Your foot looks twisted or out of its normal position. · Your foot is not stable when you step down. · You have signs of infection, such as:  ¨ Increased pain, swelling, warmth, or redness. ¨ Red streaks leading from the sore area. ¨ Pus draining from a place on your foot. ¨ A fever. · Your foot is numb or tingly. Watch closely for changes in your health, and be sure to contact your doctor if:  · You do not get better as expected. · You have bruises from an injury that last longer than 2 weeks. Where can you learn more? Go to http://sami-alex.info/. Enter H362 in the search box to learn more about \"Foot Pain: Care Instructions. \"  Current as of: March 21, 2017  Content Version: 11.3  © 9878-7727 [employer]. Care instructions adapted under license by [employer] (which disclaims liability or warranty for this information). If you have questions about a medical condition or this instruction, always ask your healthcare professional. [address] any warranty or liability for your use of this information.

## 2017-06-28 ENCOUNTER — APPOINTMENT (OUTPATIENT)
Dept: GENERAL RADIOLOGY | Age: 57
End: 2017-06-28
Attending: EMERGENCY MEDICINE
Payer: COMMERCIAL

## 2017-06-28 ENCOUNTER — HOSPITAL ENCOUNTER (EMERGENCY)
Age: 57
Discharge: HOME | End: 2017-06-28
Attending: EMERGENCY MEDICINE
Payer: COMMERCIAL

## 2017-06-28 VITALS
HEART RATE: 72 BPM | TEMPERATURE: 98.4 F | OXYGEN SATURATION: 97 % | SYSTOLIC BLOOD PRESSURE: 148 MMHG | WEIGHT: 193.5 LBS | DIASTOLIC BLOOD PRESSURE: 83 MMHG | HEIGHT: 68 IN | BODY MASS INDEX: 29.33 KG/M2 | RESPIRATION RATE: 15 BRPM

## 2017-06-28 DIAGNOSIS — L03.116 CELLULITIS OF LEFT FOOT: Primary | ICD-10-CM

## 2017-06-28 LAB
ANION GAP BLD CALC-SCNC: 8 MMOL/L (ref 5–15)
BASOPHILS # BLD AUTO: 0 K/UL (ref 0–0.1)
BASOPHILS # BLD: 0 % (ref 0–1)
BUN SERPL-MCNC: 14 MG/DL (ref 6–20)
BUN/CREAT SERPL: 15 (ref 12–20)
CALCIUM SERPL-MCNC: 9.1 MG/DL (ref 8.5–10.1)
CHLORIDE SERPL-SCNC: 101 MMOL/L (ref 97–108)
CO2 SERPL-SCNC: 27 MMOL/L (ref 21–32)
CREAT SERPL-MCNC: 0.95 MG/DL (ref 0.7–1.3)
CRP SERPL-MCNC: <0.29 MG/DL (ref 0–0.6)
EOSINOPHIL # BLD: 0.1 K/UL (ref 0–0.4)
EOSINOPHIL NFR BLD: 1 % (ref 0–7)
ERYTHROCYTE [DISTWIDTH] IN BLOOD BY AUTOMATED COUNT: 13.4 % (ref 11.5–14.5)
ERYTHROCYTE [SEDIMENTATION RATE] IN BLOOD: 13 MM/HR (ref 0–20)
GLUCOSE SERPL-MCNC: 108 MG/DL (ref 65–100)
HCT VFR BLD AUTO: 40.2 % (ref 36.6–50.3)
HGB BLD-MCNC: 13.6 G/DL (ref 12.1–17)
LYMPHOCYTES # BLD AUTO: 31 % (ref 12–49)
LYMPHOCYTES # BLD: 2.9 K/UL (ref 0.8–3.5)
MCH RBC QN AUTO: 32.1 PG (ref 26–34)
MCHC RBC AUTO-ENTMCNC: 33.8 G/DL (ref 30–36.5)
MCV RBC AUTO: 94.8 FL (ref 80–99)
MONOCYTES # BLD: 0.6 K/UL (ref 0–1)
MONOCYTES NFR BLD AUTO: 6 % (ref 5–13)
NEUTS SEG # BLD: 5.9 K/UL (ref 1.8–8)
NEUTS SEG NFR BLD AUTO: 62 % (ref 32–75)
PLATELET # BLD AUTO: 174 K/UL (ref 150–400)
POTASSIUM SERPL-SCNC: 3.9 MMOL/L (ref 3.5–5.1)
RBC # BLD AUTO: 4.24 M/UL (ref 4.1–5.7)
SODIUM SERPL-SCNC: 136 MMOL/L (ref 136–145)
URATE SERPL-MCNC: 7.1 MG/DL (ref 3.5–7.2)
WBC # BLD AUTO: 9.4 K/UL (ref 4.1–11.1)

## 2017-06-28 PROCEDURE — 96365 THER/PROPH/DIAG IV INF INIT: CPT

## 2017-06-28 PROCEDURE — 87040 BLOOD CULTURE FOR BACTERIA: CPT | Performed by: EMERGENCY MEDICINE

## 2017-06-28 PROCEDURE — 99283 EMERGENCY DEPT VISIT LOW MDM: CPT

## 2017-06-28 PROCEDURE — 36415 COLL VENOUS BLD VENIPUNCTURE: CPT | Performed by: EMERGENCY MEDICINE

## 2017-06-28 PROCEDURE — 80048 BASIC METABOLIC PNL TOTAL CA: CPT | Performed by: EMERGENCY MEDICINE

## 2017-06-28 PROCEDURE — 73630 X-RAY EXAM OF FOOT: CPT

## 2017-06-28 PROCEDURE — 85025 COMPLETE CBC W/AUTO DIFF WBC: CPT | Performed by: EMERGENCY MEDICINE

## 2017-06-28 PROCEDURE — 74011250636 HC RX REV CODE- 250/636: Performed by: EMERGENCY MEDICINE

## 2017-06-28 PROCEDURE — 84550 ASSAY OF BLOOD/URIC ACID: CPT | Performed by: EMERGENCY MEDICINE

## 2017-06-28 PROCEDURE — 86140 C-REACTIVE PROTEIN: CPT | Performed by: EMERGENCY MEDICINE

## 2017-06-28 PROCEDURE — 74011250637 HC RX REV CODE- 250/637: Performed by: EMERGENCY MEDICINE

## 2017-06-28 PROCEDURE — 85652 RBC SED RATE AUTOMATED: CPT | Performed by: EMERGENCY MEDICINE

## 2017-06-28 RX ORDER — CIPROFLOXACIN 2 MG/ML
400 INJECTION, SOLUTION INTRAVENOUS
Status: COMPLETED | OUTPATIENT
Start: 2017-06-28 | End: 2017-06-28

## 2017-06-28 RX ORDER — HYDROCODONE BITARTRATE AND ACETAMINOPHEN 5; 325 MG/1; MG/1
1 TABLET ORAL
Qty: 10 TAB | Refills: 0 | Status: SHIPPED | OUTPATIENT
Start: 2017-06-28 | End: 2017-08-09

## 2017-06-28 RX ORDER — HYDROCODONE BITARTRATE AND ACETAMINOPHEN 5; 325 MG/1; MG/1
1 TABLET ORAL
Status: COMPLETED | OUTPATIENT
Start: 2017-06-28 | End: 2017-06-28

## 2017-06-28 RX ORDER — INDOMETHACIN 25 MG/1
25 CAPSULE ORAL 3 TIMES DAILY
Qty: 15 CAP | Refills: 0 | Status: SHIPPED | OUTPATIENT
Start: 2017-06-28 | End: 2017-07-05 | Stop reason: SDUPTHER

## 2017-06-28 RX ORDER — CIPROFLOXACIN 500 MG/1
500 TABLET ORAL 2 TIMES DAILY
Qty: 14 TAB | Refills: 0 | Status: SHIPPED | OUTPATIENT
Start: 2017-06-28 | End: 2017-07-05

## 2017-06-28 RX ADMIN — CIPROFLOXACIN 400 MG: 2 INJECTION, SOLUTION INTRAVENOUS at 19:52

## 2017-06-28 RX ADMIN — HYDROCODONE BITARTRATE AND ACETAMINOPHEN 1 TABLET: 5; 325 TABLET ORAL at 18:38

## 2017-06-28 NOTE — ED TRIAGE NOTES
Triage note: pt was on a cruise last week and stepped on something while snoring. Pt started with pain Thursday. Pt saw PCP on Monday and given Colchicine and Doxy. Today increased pain and redness.

## 2017-06-28 NOTE — ED PROVIDER NOTES
HPI Comments: 64 y.o. male with past medical history significant for amxiety who presents from personal vehicle with chief complaint of foot swelling. Pt c/o worsening onset of left foot swelling that started at his great toe and has spread to his mid foot with secondary foot pain that started ~ 1 week ago. 1 week ago, pt states that he [place] when he stepped on a \"little black stick thing\" that got lodged in his foot. Pt was able to remove the object. Pt states that since then he experienced some swelling that started in his left great toe and pain in the joint. 2 days ago, pt states that he saw his PCP and was prescribed Colchicine for gout (3 tabs which he finished) and doxycycline. Pt notes that he has h/o gout \"years ago\". Pt states that he has been taking his medications for 2 days. Pt notes that his PCP wanted him to get an XRAY but \"it was a 45 minute wait so I left. \" Today, pt states that his sxs worsened prompting him to come to the ED. Pt denies h/o diabetes, having eaten a lot of shellfish, and having drank beer. There are no other acute medical concerns at this time. Social hx: UTD on tetanus, former smoker, (+)EtOH    PCP: Morris Garcia MD    Note written by Brenda Yancey. Pili Lizama, as dictated by Marcelino Urias MD 6:17 PM      The history is provided by the patient. Past Medical History:   Diagnosis Date    Anxiety        Past Surgical History:   Procedure Laterality Date    HX LUMBAR LAMINECTOMY      HX ORTHOPAEDIC  05/28/15    right humerus    HX TONSILLECTOMY           Family History:   Problem Relation Age of Onset    Family history unknown: Yes       Social History     Social History    Marital status: SINGLE     Spouse name: N/A    Number of children: N/A    Years of education: N/A     Occupational History    Not on file.      Social History Main Topics    Smoking status: Former Smoker     Quit date: 5/20/2015    Smokeless tobacco: Never Used    Alcohol use 1.0 - 1.5 oz/week     2 - 3 Standard drinks or equivalent per week    Drug use: No    Sexual activity: Yes     Partners: Male     Other Topics Concern    Not on file     Social History Narrative         ALLERGIES: Morphine and Pcn [penicillins]    Review of Systems   Constitutional: Negative for fever. Gastrointestinal: Negative for nausea and vomiting. Musculoskeletal: Positive for joint swelling (left great toe with swelling to the mid foot area) and myalgias (left foot pain). All other systems reviewed and are negative. Vitals:    06/28/17 1755   BP: (!) 165/102   Pulse: 78   Resp: 16   Temp: 98.7 °F (37.1 °C)   SpO2: 96%   Weight: 87.8 kg (193 lb 8 oz)   Height: 5' 8\" (1.727 m)            Physical Exam   Constitutional: He is oriented to person, place, and time. He appears well-developed and well-nourished. No distress. HENT:   Head: Normocephalic and atraumatic. Eyes: Conjunctivae are normal.   Neck: Normal range of motion. Neck supple. Cardiovascular: Normal rate, regular rhythm, normal heart sounds and intact distal pulses. Exam reveals no friction rub. No murmur heard. Pulmonary/Chest: Effort normal and breath sounds normal. No respiratory distress. He has no wheezes. He has no rales. He exhibits no tenderness. Abdominal: Soft. Bowel sounds are normal. He exhibits no distension. There is no tenderness. There is no rebound and no guarding. Musculoskeletal: Normal range of motion. He exhibits edema and tenderness. Left foot with swelling of entire foot and erythema to 1st mtp; small puncture wound between 1st and 2nd mtp along plantar surface + ttp here but no drainage; Neurological: He is alert and oriented to person, place, and time. Coordination normal.   Skin: Skin is warm and dry. He is not diaphoretic. No pallor. Psychiatric: He has a normal mood and affect. His behavior is normal.   Nursing note and vitals reviewed.        MDM  Number of Diagnoses or Management Options  Diagnosis management comments: Gout vs cellulitis check uric acid inflammatory markers. Discuss abx coverage with pharmacist       Amount and/or Complexity of Data Reviewed  Clinical lab tests: ordered and reviewed  Tests in the radiology section of CPT®: ordered and reviewed  Discuss the patient with other providers: yes (pharmacuist)    Patient Progress  Patient progress: stable    ED Course       Procedures    8:38 PM  Spoke with pharmacist who recommends adding keflex or cipro. Discussed with pt finished with colchicine. Will add cipro. Discussed risk of achilles tendon rupture but concern this is either infectious or gout though inflammatory markers and uric acid negative. Will see pcp tomorrow. Elevate for now. Add indomethacin as well. He will return if worsening sx.  At that point may need mri for foreign body and iv abx

## 2017-06-29 NOTE — DISCHARGE INSTRUCTIONS
We hope that we have addressed all of your medical concerns. The examination and treatment you received in the Emergency Department were for an emergent problem and were not intended as complete care. It is important that you follow up with your healthcare provider(s) for ongoing care. If your symptoms worsen or do not improve as expected, and you are unable to reach your usual health care provider(s), you should return to the Emergency Department. Today's healthcare is undergoing tremendous change, and patient satisfaction surveys are one of the many tools to assess the quality of medical care. You may receive a survey from the [employer] regarding your experience in the Emergency Department. I hope that your experience has been completely positive, particularly the medical care that I provided. As such, please participate in the survey; anything less than excellent does not meet my expectations or intentions. [facility] and [address] [place] [address] participate in nationally recognized quality of care measures. If your blood pressure is greater than 120/80, as reported below, we urge that you seek medical care to address the potential of high blood pressure, commonly known as hypertension. Hypertension can be hereditary or can be caused by certain medical conditions, pain, stress, or \"white coat syndrome. \"       Please make an appointment with your health care provider(s) for follow up of your Emergency Department visit. VITALS:   Patient Vitals for the past 8 hrs:   Temp Pulse Resp BP SpO2   06/28/17 1755 98.7 °F (37.1 °C) 78 16 (!) 165/102 96 %          Thank you for allowing us to provide you with medical care today. We realize that you have many choices for your emergency care needs. Please choose us in the future for any continued health care needs.       Felipe Doyle MD    [facility], [address] [place] [address]. Office: [phone]            Recent Results (from the past 24 hour(s))   CBC WITH AUTOMATED DIFF    Collection Time: 06/28/17  6:30 PM   Result Value Ref Range    WBC 9.4 4.1 - 11.1 K/uL    RBC 4.24 4.10 - 5.70 M/uL    HGB 13.6 12.1 - 17.0 g/dL    HCT 40.2 36.6 - 50.3 %    MCV 94.8 80.0 - 99.0 FL    MCH 32.1 26.0 - 34.0 PG    MCHC 33.8 30.0 - 36.5 g/dL    RDW 13.4 11.5 - 14.5 %    PLATELET 388 278 - 432 K/uL    NEUTROPHILS 62 32 - 75 %    LYMPHOCYTES 31 12 - 49 %    MONOCYTES 6 5 - 13 %    EOSINOPHILS 1 0 - 7 %    BASOPHILS 0 0 - 1 %    ABS. NEUTROPHILS 5.9 1.8 - 8.0 K/UL    ABS. LYMPHOCYTES 2.9 0.8 - 3.5 K/UL    ABS. MONOCYTES 0.6 0.0 - 1.0 K/UL    ABS. EOSINOPHILS 0.1 0.0 - 0.4 K/UL    ABS. BASOPHILS 0.0 0.0 - 0.1 K/UL   METABOLIC PANEL, BASIC    Collection Time: 06/28/17  6:30 PM   Result Value Ref Range    Sodium 136 136 - 145 mmol/L    Potassium 3.9 3.5 - 5.1 mmol/L    Chloride 101 97 - 108 mmol/L    CO2 27 21 - 32 mmol/L    Anion gap 8 5 - 15 mmol/L    Glucose 108 (H) 65 - 100 mg/dL    BUN 14 6 - 20 MG/DL    Creatinine 0.95 0.70 - 1.30 MG/DL    BUN/Creatinine ratio 15 12 - 20      GFR est AA >60 >60 ml/min/1.73m2    GFR est non-AA >60 >60 ml/min/1.73m2    Calcium 9.1 8.5 - 10.1 MG/DL   C REACTIVE PROTEIN, QT    Collection Time: 06/28/17  6:30 PM   Result Value Ref Range    C-Reactive protein <0.29 0.00 - 0.60 mg/dL   SED RATE (ESR)    Collection Time: 06/28/17  6:30 PM   Result Value Ref Range    Sed rate, automated 13 0 - 20 mm/hr   URIC ACID    Collection Time: 06/28/17  6:30 PM   Result Value Ref Range    Uric acid 7.1 3.5 - 7.2 MG/DL       Xr Foot Lt Min 3 V    Result Date: 6/28/2017  EXAM:  XR FOOT LT MIN 3 V Coronal history: Pain and swelling possible foreign body INDICATION:   pain swelling possible foreign body between base of 1st and 2nd mtp. COMPARISON:  None. FINDINGS:  Three views of the left foot demonstrate no fracture or other acute osseous or articular abnormality.   The soft tissues are within normal limits. No radiopaque foreign body. Small tophus adjacent to the distal first phalanx. IMPRESSION:  No radiopaque foreign body. .       Cellulitis: Care Instructions  Your Care Instructions    Cellulitis is a skin infection. It often occurs after a break in the skin from a scrape, cut, bite, or puncture, or after a rash. The doctor has checked you carefully, but problems can develop later. If you notice any problems or new symptoms, get medical treatment right away. Follow-up care is a key part of your treatment and safety. Be sure to make and go to all appointments, and call your doctor if you are having problems. It's also a good idea to know your test results and keep a list of the medicines you take. How can you care for yourself at home? · Take your antibiotics as directed. Do not stop taking them just because you feel better. You need to take the full course of antibiotics. · Prop up the infected area on pillows to reduce pain and swelling. Try to keep the area above the level of your heart as often as you can. · If your doctor told you how to care for your wound, follow your doctor's instructions. If you did not get instructions, follow this general advice:  ¨ Wash the wound with clean water 2 times a day. Don't use hydrogen peroxide or alcohol, which can slow healing. ¨ You may cover the wound with a thin layer of petroleum jelly, such as Vaseline, and a nonstick bandage. ¨ Apply more petroleum jelly and replace the bandage as needed. · Be safe with medicines. Take pain medicines exactly as directed. ¨ If the doctor gave you a prescription medicine for pain, take it as prescribed. ¨ If you are not taking a prescription pain medicine, ask your doctor if you can take an over-the-counter medicine. To prevent cellulitis in the future  · Try to prevent cuts, scrapes, or other injuries to your skin. Cellulitis most often occurs where there is a break in the skin.   · If you get a scrape, cut, mild burn, or bite, wash the wound with clean water as soon as you can to help avoid infection. Don't use hydrogen peroxide or alcohol, which can slow healing. · If you have swelling in your legs (edema), support stockings and good skin care may help prevent leg sores and cellulitis. · Take care of your feet, especially if you have diabetes or other conditions that increase the risk of infection. Wear shoes and socks. Do not go barefoot. If you have athlete's foot or other skin problems on your feet, talk to your doctor about how to treat them. When should you call for help? Call your doctor now or seek immediate medical care if:  · You have signs that your infection is getting worse, such as:  ¨ Increased pain, swelling, warmth, or redness. ¨ Red streaks leading from the area. ¨ Pus draining from the area. ¨ A fever. · You get a rash. Watch closely for changes in your health, and be sure to contact your doctor if:  · You are not getting better after 1 day (24 hours). · You do not get better as expected. Where can you learn more? Go to http://sami-alex.info/. Liang Lakhani in the search box to learn more about \"Cellulitis: Care Instructions. \"  Current as of: October 13, 2016  Content Version: 11.3  © 4396-1282 [employer]. Care instructions adapted under license by [employer] (which disclaims liability or warranty for this information). If you have questions about a medical condition or this instruction, always ask your healthcare professional. [place] [address] any warranty or liability for your use of this information.

## 2017-07-04 LAB
BACTERIA SPEC CULT: NORMAL
SERVICE CMNT-IMP: NORMAL

## 2017-07-05 ENCOUNTER — OFFICE VISIT (OUTPATIENT)
Dept: INTERNAL MEDICINE CLINIC | Age: 57
End: 2017-07-05

## 2017-07-05 VITALS
OXYGEN SATURATION: 98 % | BODY MASS INDEX: 29.89 KG/M2 | DIASTOLIC BLOOD PRESSURE: 84 MMHG | SYSTOLIC BLOOD PRESSURE: 132 MMHG | RESPIRATION RATE: 18 BRPM | WEIGHT: 197.2 LBS | HEART RATE: 85 BPM | HEIGHT: 68 IN | TEMPERATURE: 98.4 F

## 2017-07-05 DIAGNOSIS — M10.472 GOUT DUE TO OTHER SECONDARY CAUSE INVOLVING TOE OF LEFT FOOT, UNSPECIFIED CHRONICITY: Primary | ICD-10-CM

## 2017-07-05 RX ORDER — COLCHICINE 0.6 MG/1
0.6 TABLET ORAL 2 TIMES DAILY
Qty: 28 TAB | Refills: 0 | Status: SHIPPED | OUTPATIENT
Start: 2017-07-05 | End: 2017-07-19

## 2017-07-05 RX ORDER — INDOMETHACIN 25 MG/1
25 CAPSULE ORAL
Qty: 30 CAP | Refills: 0 | Status: SHIPPED | OUTPATIENT
Start: 2017-07-05 | End: 2017-08-09

## 2017-07-05 NOTE — PROGRESS NOTES
Chief Complaint   Patient presents with    Gout     1. Have you been to the ER, urgent care clinic since your last visit? Hospitalized since your last visit? Yes When: 6/2017 Where: [address] [place] [address] Reason for visit: Gout    2. Have you seen or consulted any other health care providers outside of the [address] [place] [address] since your last visit? Include any pap smears or colon screening.  No     ER visit for gout

## 2017-07-05 NOTE — PROGRESS NOTES
HISTORY OF PRESENT ILLNESS  Kaiser Suresh is a 64 y.o. male. HPI  Gout Follow-Up  Improving. Seen in the ED on 6/28/17 placed on Cipro and Indomethicin. Xray showed tophi. Mild diarrhea this AM.  Denies fevers, chill. ROS  Patient Active Problem List    Diagnosis Date Noted    NEETU (obstructive sleep apnea) 03/29/2016    Anxiety 12/16/2014    HLD (hyperlipidemia) 12/16/2014    GERD (gastroesophageal reflux disease) 12/16/2014    PND (post-nasal drip) 12/16/2014       Current Outpatient Prescriptions   Medication Sig Dispense Refill    ciprofloxacin HCl (CIPRO) 500 mg tablet Take 1 Tab by mouth two (2) times a day for 7 days. 14 Tab 0    indomethacin (INDOCIN) 25 mg capsule Take 1 Cap by mouth three (3) times daily. With food 15 Cap 0    sildenafil citrate (VIAGRA) 100 mg tablet Take 1 Tab by mouth as needed. 8 Tab 2    atorvastatin (LIPITOR) 20 mg tablet Take 1 Tab by mouth daily. 30 Tab 1    venlafaxine-SR (EFFEXOR-XR) 75 mg capsule TAKE 1 CAPSULES BY MOUTH EVERY DAY 90 Cap 2    albuterol (PROVENTIL HFA, VENTOLIN HFA, PROAIR HFA) 90 mcg/actuation inhaler Take 1 Puff by inhalation every six (6) hours as needed for Wheezing. 1 Inhaler 4    omeprazole (PRILOSEC) 40 mg capsule Take 40 mg by mouth daily.  cetirizine (ZYRTEC) 10 mg tablet Take 10 mg by mouth daily.  HYDROcodone-acetaminophen (NORCO) 5-325 mg per tablet Take 1 Tab by mouth every four (4) hours as needed for Pain. Max Daily Amount: 6 Tabs. 10 Tab 0    finasteride (PROSCAR) 5 mg tablet 1/4 pill daily 30 Tab 1    mupirocin (BACTROBAN) 2 % ointment Apply  to affected area daily.  22 g 0       Allergies   Allergen Reactions    Morphine Itching    Pcn [Penicillins] Itching      Visit Vitals    /84 (BP 1 Location: Right arm, BP Patient Position: Sitting)    Pulse 85    Temp 98.4 °F (36.9 °C) (Oral)    Resp 18    Ht 5' 8\" (1.727 m)    Wt 197 lb 3.2 oz (89.4 kg)    SpO2 98%    BMI 29.98 kg/m2       Physical Exam Constitutional: He is oriented to person, place, and time. No distress. Neurological: He is alert and oriented to person, place, and time. Psychiatric: He has a normal mood and affect. LLE; Resolving podagra. FROM at first MTP    ASSESSMENT and [address]  was seen today for gout. Diagnoses and all orders for this visit:    Gout due to other secondary cause involving toe of left foot, unspecified chronicity- resolving. Stop Cipro  Continue Colchicine   Use Indomethacin as needed for severe pain  -     colchicine 0.6 mg tablet; Take 1 Tab by mouth two (2) times a day for 14 days. -     indomethacin (INDOCIN) 25 mg capsule; Take 1 Cap by mouth three (3) times daily as needed. With food      Follow-up Disposition: if sx worsen before scheduled appointment     Medication risks/benefits/costs/interactions/alternatives discussed with patient. Patricia Flor  was given an after visit summary which includes diagnoses, current medications, & vitals. he expressed understanding with the diagnosis and plan.

## 2017-07-05 NOTE — PATIENT INSTRUCTIONS
It was a pleasure to see you! As discussed:    Stop Cipro  Continue Colchicine   Use Indomethacin as needed for severe pain     Purine-Restricted Diet: Care Instructions  Your Care Instructions  Purines are substances that are found in some foods. Your body turns purines into uric acid. High levels of uric acid can cause gout, which is a form of arthritis that causes pain and inflammation in joints. You may be able to help control the amount of uric acid in your body by limiting high-purine foods in your diet. Follow-up care is a key part of your treatment and safety. Be sure to make and go to all appointments, and call your doctor if you are having problems. It's also a good idea to know your test results and keep a list of the medicines you take. How can you care for yourself at home? · Plan your meals and snacks around foods that are low in purines and are safe for you to eat. These foods include:  ¨ Green vegetables and tomatoes. ¨ Fruits. ¨ Whole-grain breads, rice, and cereals. ¨ Eggs, peanut butter, and nuts. ¨ Low-fat milk, cheese, and other milk products. ¨ Popcorn. ¨ Gelatin desserts, chocolate, cocoa, and cakes and sweets, in small amounts. · You can eat certain foods that are medium-high in purines, but eat them only once in a while. These foods include:  ¨ Legumes, such as dried beans and dried peas. You can have 1 cup cooked legumes each day. ¨ Asparagus, cauliflower, spinach, mushrooms, and green peas. ¨ Fish and seafood (other than very high-purine seafood). ¨ Oatmeal, wheat bran, and wheat germ. · Limit very high-purine foods, including:  ¨ Organ meats, such as liver, kidneys, sweetbreads, and brains. ¨ Meats, including seth, beef, pork, and lamb. ¨ Game meats and any other meats in large amounts. ¨ Anchovies, sardines, herring, mackerel, and scallops. ¨ Gravy. ¨ Beer. Where can you learn more? Go to http://scar.info/.   Enter F448 in the search box to learn more about \"Purine-Restricted Diet: Care Instructions. \"  Current as of: July 26, 2016  Content Version: 11.3  © 1966-7642 [employer], [employer]. Care instructions adapted under license by [employer] (which disclaims liability or warranty for this information). If you have questions about a medical condition or this instruction, always ask your healthcare professional. [address] any warranty or liability for your use of this information.

## 2017-08-09 ENCOUNTER — OFFICE VISIT (OUTPATIENT)
Dept: INTERNAL MEDICINE CLINIC | Age: 57
End: 2017-08-09

## 2017-08-09 VITALS
DIASTOLIC BLOOD PRESSURE: 90 MMHG | HEART RATE: 92 BPM | WEIGHT: 199.4 LBS | SYSTOLIC BLOOD PRESSURE: 118 MMHG | TEMPERATURE: 98.3 F | RESPIRATION RATE: 18 BRPM | HEIGHT: 70 IN | BODY MASS INDEX: 28.55 KG/M2 | OXYGEN SATURATION: 97 %

## 2017-08-09 DIAGNOSIS — K21.9 GASTROESOPHAGEAL REFLUX DISEASE WITHOUT ESOPHAGITIS: ICD-10-CM

## 2017-08-09 DIAGNOSIS — R79.89 LOW TESTOSTERONE: ICD-10-CM

## 2017-08-09 DIAGNOSIS — E78.2 MIXED HYPERLIPIDEMIA: ICD-10-CM

## 2017-08-09 DIAGNOSIS — D22.9 ATYPICAL NEVI: ICD-10-CM

## 2017-08-09 DIAGNOSIS — Z12.11 COLON CANCER SCREENING: ICD-10-CM

## 2017-08-09 DIAGNOSIS — Z00.00 ROUTINE GENERAL MEDICAL EXAMINATION AT A HEALTH CARE FACILITY: Primary | ICD-10-CM

## 2017-08-09 DIAGNOSIS — B35.1 ONYCHOMYCOSIS: ICD-10-CM

## 2017-08-09 RX ORDER — TESTOSTERONE CYPIONATE 200 MG/ML
INJECTION INTRAMUSCULAR
Qty: 0 VIAL | Refills: 0 | COMMUNITY
Start: 2017-08-09

## 2017-08-09 NOTE — PATIENT INSTRUCTIONS
It was a pleasure to see you again! I'm glad you are better. Health Maintenance   I have ordered your age appropriate labs please complete them. You will need to fast 10-12hrs before your appointment. Great job on [employer] and exercising! Remember goal for exercise is 150minutes of moderate exercise a week and diet goal is to eat 50% fruits and vegetables with minimal sugar, fat and oil daily. See health.gov or choosemyplate.gov for more details. Toe Fungus  Gently buff your nails lightly (<2 secs with minimal pressure) and apply Vicks vapor rub +/- tea tree oil. It should take 3-4 months to be completely effective. See below for more recommendations. Gout: Care Instructions  Your Care Instructions  Gout is a form of arthritis caused by a buildup of uric acid crystals in a joint. It causes sudden attacks of pain, swelling, redness, and stiffness, usually in one joint, especially the big toe. Gout usually comes on without a cause. But it can be brought on by drinking alcohol (especially beer) or eating seafood and red meat. Taking certain medicines, such as diuretics or aspirin, also can bring on an attack of gout. Taking your medicines as prescribed and following up with your doctor regularly can help you avoid gout attacks in the future. Follow-up care is a key part of your treatment and safety. Be sure to make and go to all appointments, and call your doctor if you are having problems. Its also a good idea to know your test results and keep a list of the medicines you take. How can you care for yourself at home? · If the joint is swollen, put ice or a cold pack on the area for 10 to 20 minutes at a time. Put a thin cloth between the ice and your skin. · Prop up the sore limb on a pillow when you ice it or anytime you sit or lie down during the next 3 days. Try to keep it above the level of your heart. This will help reduce swelling. · Rest sore joints.  Avoid activities that put weight or strain on the joints for a few days. Take short rest breaks from your regular activities during the day. · Take your medicines exactly as prescribed. Call your doctor if you think you are having a problem with your medicine. · Take pain medicines exactly as directed. ¨ If the doctor gave you a prescription medicine for pain, take it as prescribed. ¨ If you are not taking a prescription pain medicine, ask your doctor if you can take an over-the-counter medicine. · Eat less seafood and red meat. · Check with your doctor before drinking alcohol. · Losing weight, if you are overweight, may help reduce attacks of gout. But do not go on a [employer]. \" Losing a lot of weight in a short amount of time can cause a gout attack. When should you call for help? Call your doctor now or seek immediate medical care if:  · You have a fever. · The joint is so painful you cannot use it. · You have sudden, unexplained swelling, redness, warmth, or severe pain in one or more joints. Watch closely for changes in your health, and be sure to contact your doctor if:  · You have joint pain. · Your symptoms get worse or are not improving after 2 or 3 days. Where can you learn more? Go to http://sami-alex.info/. Enter S480 in the search box to learn more about \"Gout: Care Instructions. \"  Current as of: October 31, 2016  Content Version: 11.3  © 6004-1440 [employer]. Care instructions adapted under license by [employer] (which disclaims liability or warranty for this information). If you have questions about a medical condition or this instruction, always ask your healthcare professional. [address] any warranty or liability for your use of this information.

## 2017-08-09 NOTE — PROGRESS NOTES
Chief Complaint   Patient presents with    Complete Physical     1. Have you been to the ER, urgent care clinic since your last visit? Hospitalized since your last visit? No    2. Have you seen or consulted any other health care providers outside of the [address] [place] [address] since your last visit? Include any pap smears or colon screening.  Yes When: 3 week ago Where: Urologist Reason for visit: Testosterone Inj

## 2017-08-09 NOTE — PROGRESS NOTES
HISTORY OF PRESENT ILLNESS  Rosario Johns is a 64 y.o. male. HPI  Gout  Has improved after spreading to other toe. He is working diet modification. Some toe pain overnight. Denies fevers, chills, n/v.     Cardiovascular Review:  The patient has hyperlipidemia Body mass index is 28.23 kg/(m^2). Diet and Lifestyle: generally follows a low fat low cholesterol diet, exercises regularly, nonsmoker  Home BP Monitoring: is not measured at home. Pertinent ROS: taking medications as instructed, no medication side effects noted, no TIA's, no chest pain on exertion, no dyspnea on exertion, no swelling of ankles. Review of Systems   Constitutional: Negative for chills, fever and weight loss. HENT: Negative for congestion and sore throat. Eyes: Negative for blurred vision and double vision. Respiratory: Negative for cough and shortness of breath. Using CPAP    Cardiovascular: Negative for chest pain, orthopnea and leg swelling. Gastrointestinal: Negative for abdominal pain, blood in stool, constipation, diarrhea, heartburn, nausea and vomiting. Genitourinary: Negative for frequency and urgency. Musculoskeletal: Positive for joint pain (toe ). Negative for myalgias. Neurological: Negative for dizziness, tremors, weakness and headaches. Endo/Heme/Allergies: Does not bruise/bleed easily. Psychiatric/Behavioral: Negative for depression and suicidal ideas. Patient Active Problem List    Diagnosis Date Noted    NEETU (obstructive sleep apnea) 03/29/2016    Anxiety 12/16/2014    HLD (hyperlipidemia) 12/16/2014    GERD (gastroesophageal reflux disease) 12/16/2014    PND (post-nasal drip) 12/16/2014       Current Outpatient Prescriptions   Medication Sig Dispense Refill    sildenafil citrate (VIAGRA) 100 mg tablet Take 1 Tab by mouth as needed. 8 Tab 2    atorvastatin (LIPITOR) 20 mg tablet Take 1 Tab by mouth daily.  30 Tab 1    venlafaxine-SR (EFFEXOR-XR) 75 mg capsule TAKE 1 CAPSULES BY MOUTH EVERY DAY 90 Cap 2    omeprazole (PRILOSEC) 40 mg capsule Take 40 mg by mouth daily.  cetirizine (ZYRTEC) 10 mg tablet Take 10 mg by mouth daily. Allergies   Allergen Reactions    Morphine Itching    Pcn [Penicillins] Itching      Visit Vitals    /90 (BP 1 Location: Right arm, BP Patient Position: Sitting)    Pulse 92    Temp 98.3 °F (36.8 °C) (Oral)    Resp 18    Ht 5' 10.47\" (1.79 m)    Wt 199 lb 6.4 oz (90.4 kg)    SpO2 97%    BMI 28.23 kg/m2       Physical Exam   Constitutional: He is oriented to person, place, and time. He appears well-developed and well-nourished. No distress. HENT:   Head: Normocephalic and atraumatic. Right Ear: External ear normal.   Left Ear: External ear normal.   Nose: Nose normal.   Mouth/Throat: Oropharynx is clear and moist. No oropharyngeal exudate. Eyes: Conjunctivae are normal.   Neck: No JVD present. No thyromegaly present. Cardiovascular: Normal rate, regular rhythm and normal heart sounds. Exam reveals no gallop and no friction rub. No murmur heard. Pulmonary/Chest: Effort normal and breath sounds normal. No respiratory distress. He has no wheezes. He has no rales. Abdominal: Soft. Bowel sounds are normal. He exhibits no distension. There is no tenderness. There is no rebound. Musculoskeletal: Normal range of motion. He exhibits no edema. Feet:    Neurological: He is alert and oriented to person, place, and time. No cranial nerve deficit. Skin: Skin is warm and dry. No erythema. Psychiatric: He has a normal mood and affect. His behavior is normal.       ASSESSMENT and PLAN  Diagnoses and all orders for this visit:    1. Routine general medical examination at a health care facility- Health Maintenance reviewed - labs ordered . -     LIPID PANEL  -     CBC WITH AUTOMATED DIFF  -     METABOLIC PANEL, COMPREHENSIVE    2. Low testosterone- per URology on Trone injections     3.  Mixed hyperlipidemia- check lipids     4. Gastroesophageal reflux disease without esophagitis- stable    5. Onychomycosis- trial of topical treatment  Toe Fungus  Gently buff your nails lightly (<2 secs with minimal pressure) and apply Vicks vapor rub +/- tea tree oil. It should take 3-4 months to be completely effective. See below for more recommendations. 6. Colon cancer screening- obtain colonoscopy records to verify dates. FIT in interim  -     OCCULT BLOOD, IMMUNOASSAY (FIT)    7. Atypical nevi  -     REFERRAL TO DERMATOLOGY      Follow-up Disposition:  Return in about 4 months (around 12/9/2017) for Follow-up. Medication risks/benefits/costs/interactions/alternatives discussed with patient. Felicia Collins  was given an after visit summary which includes diagnoses, current medications, & vitals. he expressed understanding with the diagnosis and plan.

## 2017-08-31 LAB
ALBUMIN SERPL-MCNC: 4.4 G/DL (ref 3.5–5.5)
ALBUMIN/GLOB SERPL: 2.1 {RATIO} (ref 1.2–2.2)
ALP SERPL-CCNC: 47 IU/L (ref 39–117)
ALT SERPL-CCNC: 30 IU/L (ref 0–44)
AST SERPL-CCNC: 17 IU/L (ref 0–40)
BASOPHILS # BLD AUTO: 0 X10E3/UL (ref 0–0.2)
BASOPHILS NFR BLD AUTO: 0 %
BILIRUB SERPL-MCNC: 0.4 MG/DL (ref 0–1.2)
BUN SERPL-MCNC: 14 MG/DL (ref 6–24)
BUN/CREAT SERPL: 16 (ref 9–20)
CALCIUM SERPL-MCNC: 9.2 MG/DL (ref 8.7–10.2)
CHLORIDE SERPL-SCNC: 102 MMOL/L (ref 96–106)
CHOLEST SERPL-MCNC: 111 MG/DL (ref 100–199)
CO2 SERPL-SCNC: 23 MMOL/L (ref 18–29)
CREAT SERPL-MCNC: 0.88 MG/DL (ref 0.76–1.27)
EOSINOPHIL # BLD AUTO: 0.1 X10E3/UL (ref 0–0.4)
EOSINOPHIL NFR BLD AUTO: 1 %
ERYTHROCYTE [DISTWIDTH] IN BLOOD BY AUTOMATED COUNT: 13.8 % (ref 12.3–15.4)
GLOBULIN SER CALC-MCNC: 2.1 G/DL (ref 1.5–4.5)
GLUCOSE SERPL-MCNC: 129 MG/DL (ref 65–99)
HCT VFR BLD AUTO: 41 % (ref 37.5–51)
HDLC SERPL-MCNC: 41 MG/DL
HGB BLD-MCNC: 13.8 G/DL (ref 12.6–17.7)
IMM GRANULOCYTES # BLD: 0 X10E3/UL (ref 0–0.1)
IMM GRANULOCYTES NFR BLD: 0 %
LDLC SERPL CALC-MCNC: 14 MG/DL (ref 0–99)
LYMPHOCYTES # BLD AUTO: 2.4 X10E3/UL (ref 0.7–3.1)
LYMPHOCYTES NFR BLD AUTO: 30 %
MCH RBC QN AUTO: 32.5 PG (ref 26.6–33)
MCHC RBC AUTO-ENTMCNC: 33.7 G/DL (ref 31.5–35.7)
MCV RBC AUTO: 97 FL (ref 79–97)
MONOCYTES # BLD AUTO: 0.6 X10E3/UL (ref 0.1–0.9)
MONOCYTES NFR BLD AUTO: 7 %
NEUTROPHILS # BLD AUTO: 4.9 X10E3/UL (ref 1.4–7)
NEUTROPHILS NFR BLD AUTO: 62 %
PLATELET # BLD AUTO: 198 X10E3/UL (ref 150–379)
POTASSIUM SERPL-SCNC: 4.2 MMOL/L (ref 3.5–5.2)
PROT SERPL-MCNC: 6.5 G/DL (ref 6–8.5)
RBC # BLD AUTO: 4.25 X10E6/UL (ref 4.14–5.8)
SODIUM SERPL-SCNC: 141 MMOL/L (ref 134–144)
TRIGL SERPL-MCNC: 280 MG/DL (ref 0–149)
VLDLC SERPL CALC-MCNC: 56 MG/DL (ref 5–40)
WBC # BLD AUTO: 8 X10E3/UL (ref 3.4–10.8)

## 2017-09-03 LAB
HBA1C MFR BLD: 6.1 % (ref 4.8–5.6)
SPECIMEN STATUS REPORT, ROLRST: NORMAL

## 2017-09-19 NOTE — PROGRESS NOTES
Pranay Iglesias,  Thank you for helping take care of Mr. Judith Gregorio. Please see his recent lab work. His triglycerides have improved but LDL is only 14 and oh there have been some studies suggesting adverse effects ofvery low LDL. Please let me know if he recommend any medication changes   ----   Hi Mr. Judith Gregorio,  Thank you for completing your labs. As you have seen on my chart your prediabetes is stable. Your triglycerides have reduced significantly. Your bad cholesterol is very low. I have included Dr. Mariza Mcpherson on this email so that you both can discuss possible medication changes if needed at your upcoming appointment. The remainder of your labs including your kidney and liver function are normal.  You are also not anemic meaning your blood cell levels are good. Do not hesitate to contact the office if you have any questions or concerns before your next appointment.    Kind regards,   Dr. Christopher Etienne

## 2017-09-26 DIAGNOSIS — E78.1 PURE HYPERGLYCERIDEMIA: ICD-10-CM

## 2017-09-27 DIAGNOSIS — M10.079 ACUTE IDIOPATHIC GOUT INVOLVING TOE, UNSPECIFIED LATERALITY: Primary | ICD-10-CM

## 2017-09-27 RX ORDER — COLCHICINE 0.6 MG/1
CAPSULE ORAL
Qty: 12 CAP | Refills: 0 | Status: SHIPPED | OUTPATIENT
Start: 2017-09-27 | End: 2018-02-01

## 2017-09-27 RX ORDER — INDOMETHACIN 25 MG/1
25 CAPSULE ORAL
Qty: 30 CAP | Refills: 1 | Status: SHIPPED | OUTPATIENT
Start: 2017-09-27 | End: 2018-02-01

## 2017-09-30 NOTE — PROGRESS NOTES
Please let Estefany Egan know I spoke to Dr. Marily Leyva and she recommends decreasing his lipitor to 10mg (half current dose) given that his LDL is very LOW (only 14)

## 2017-10-02 NOTE — PROGRESS NOTES
Patient has been informed per drs result notes and recommendations. Pt verbalizes understanding regarding current LDL level.

## 2017-10-03 RX ORDER — ATORVASTATIN CALCIUM 20 MG/1
20 TABLET, FILM COATED ORAL DAILY
Qty: 30 TAB | Refills: 1 | Status: SHIPPED | OUTPATIENT
Start: 2017-10-03 | End: 2017-11-22 | Stop reason: SDUPTHER

## 2017-10-25 ENCOUNTER — OFFICE VISIT (OUTPATIENT)
Dept: CARDIOLOGY CLINIC | Age: 57
End: 2017-10-25

## 2017-10-25 VITALS
HEART RATE: 88 BPM | HEIGHT: 70 IN | SYSTOLIC BLOOD PRESSURE: 130 MMHG | WEIGHT: 199 LBS | RESPIRATION RATE: 16 BRPM | DIASTOLIC BLOOD PRESSURE: 80 MMHG | BODY MASS INDEX: 28.49 KG/M2

## 2017-10-25 DIAGNOSIS — E78.2 MIXED HYPERLIPIDEMIA: ICD-10-CM

## 2017-10-25 DIAGNOSIS — N52.9 ERECTILE DYSFUNCTION, UNSPECIFIED ERECTILE DYSFUNCTION TYPE: ICD-10-CM

## 2017-10-25 DIAGNOSIS — G47.33 OSA (OBSTRUCTIVE SLEEP APNEA): ICD-10-CM

## 2017-10-25 DIAGNOSIS — I73.9 PAD (PERIPHERAL ARTERY DISEASE) (HCC): ICD-10-CM

## 2017-10-25 DIAGNOSIS — K21.9 GASTROESOPHAGEAL REFLUX DISEASE WITHOUT ESOPHAGITIS: ICD-10-CM

## 2017-10-25 DIAGNOSIS — R09.82 PND (POST-NASAL DRIP): ICD-10-CM

## 2017-10-25 DIAGNOSIS — I25.10 CORONARY ARTERY DISEASE INVOLVING NATIVE CORONARY ARTERY OF NATIVE HEART WITHOUT ANGINA PECTORIS: Primary | ICD-10-CM

## 2017-10-25 RX ORDER — OMEPRAZOLE 20 MG/1
20 CAPSULE, DELAYED RELEASE ORAL DAILY
COMMUNITY
End: 2022-03-28 | Stop reason: SDUPTHER

## 2017-10-25 RX ORDER — ASPIRIN 81 MG/1
TABLET ORAL DAILY
COMMUNITY
End: 2019-05-22

## 2017-10-25 NOTE — MR AVS SNAPSHOT
Visit Information Date & Time Provider Department Dept. Phone Encounter #  
 10/25/2017 10:40 AM Uri Tavera MD [facility] [place] [phone] [id number] Your Appointments 12/6/2017  8:45 AM  
ROUTINE CARE with Dash Ha MD  
[facility] Internal Medicine [address]) Appt Note: 3 month f/u  
 [address] [facility] [address] [place] [address]  
[place]  [address] [place] [address] Upcoming Health Maintenance Date Due COLONOSCOPY 9/8/1978 DTaP/Tdap/Td series (2 - Td) 4/6/2025 Allergies as of 10/25/2017  Review Complete On: 10/25/2017 By: Tyrone Mejia Severity Noted Reaction Type Reactions Morphine  12/16/2014    Itching Pcn [Penicillins]  12/16/2014    Itching Current Immunizations  Never Reviewed No immunizations on file. Not reviewed this visit You Were Diagnosed With   
  
 Codes Comments Mixed hyperlipidemia    -  Primary ICD-10-CM: I85.9 ICD-9-CM: 272.2 Coronary artery disease involving native coronary artery of native heart without angina pectoris     ICD-10-CM: I25.10 ICD-9-CM: 414.01   
 PND (post-nasal drip)     ICD-10-CM: R09.82 ICD-9-CM: 784.91   
 NEETU (obstructive sleep apnea)     ICD-10-CM: G47.33 
ICD-9-CM: 327.23 Gastroesophageal reflux disease without esophagitis     ICD-10-CM: K21.9 ICD-9-CM: 530.81 Vitals BP Pulse Resp Height(growth percentile) Weight(growth percentile) BMI  
 130/80 (BP 1 Location: Right arm, BP Patient Position: Sitting) 88 16 5' 10.47\" (1.79 m) 199 lb (90.3 kg) 28.17 kg/m2 Smoking Status Former Smoker Vitals History BMI and BSA Data Body Mass Index Body Surface Area  
 28.17 kg/m 2 2.12 m 2 Preferred Pharmacy Pharmacy Name Phone [id number]. [phone] Your Updated Medication List  
  
 This list is accurate as of: 10/25/17 11:18 AM.  Always use your most recent med list.  
  
  
  
  
 aspirin delayed-release 81 mg tablet Take  by mouth daily. atorvastatin 20 mg tablet Commonly known as:  LIPITOR Take 1 Tab by mouth daily. colchicine 0.6 mg capsule Commonly known as:  [place] By mouth Take 1.2mg once then in 1 hour take 0.6mg (one tab)  
  
 indomethacin 25 mg capsule Commonly known as:  INDOCIN Take 1 Cap by mouth three (3) times daily as needed. With food * omeprazole 40 mg capsule Commonly known as:  PRILOSEC Take 40 mg by mouth daily. * omeprazole 20 mg capsule Commonly known as:  PRILOSEC Take 20 mg by mouth daily. sildenafil citrate 100 mg tablet Commonly known as:  VIAGRA Take 1 Tab by mouth as needed. testosterone cypionate 200 mg/mL injection Commonly known as:  DEPOTESTOTERONE CYPIONATE  
by IntraMUSCular route every fourteen (14) days. venlafaxine-SR 75 mg capsule Commonly known as:  EFFEXOR-XR  
TAKE 1 CAPSULES BY MOUTH EVERY DAY ZyrTEC 10 mg tablet Generic drug:  cetirizine Take 10 mg by mouth daily. * Notice: This list has 2 medication(s) that are the same as other medications prescribed for you. Read the directions carefully, and ask your doctor or other care provider to review them with you. We Performed the Following AMB POC EKG ROUTINE W/ 12 LEADS, INTER & REP [84661 CPT(R)] LIPID PANEL [90800 CPT(R)] METABOLIC PANEL, COMPREHENSIVE [04638 CPT(R)] Patient Instructions Take 100mg fish oil three times a day Have your labs re checked in December Introducing [facility] [place] [facility]! Dear Monique Brandon: Thank you for requesting a [employer] account. Our records indicate that you already have an active [employer] account. You can access your account anytime at [email or web address] Did you know that you can access your hospital and ER discharge instructions at any time in [employer]? You can also review all of your test results from your hospital stay or ER visit. Additional Information If you have questions, please visit the Frequently Asked Questions section of the [employer] website at [email or web address] Remember, mGaadi is NOT to be used for urgent needs. For medical emergencies, dial 911. Now available from your iPhone and Android! Please provide this summary of care documentation to your next provider. Your primary care clinician is listed as Dash Ha. If you have any questions after today's visit, please call [phone].

## 2017-10-25 NOTE — PROGRESS NOTES
[facility] [address]: Chong Drew  [phone]  Requesting/referring provider: Dr. Wendy Germain  Reason for Consult: CAD    HPI: Chandler Leung, a 62y.o. year-old who presents for evaluation of CAD found on calcium score, LDL <14. His brother had ca score that was high, and is a runner and has low cholesterol. He is exercising, goes to the gym with a  3 times a week. Lifts weights. Walked this weekend 10 miles and felt fine. He has been on cholesterol meds x 10 years. Tg were high back then. He is now taking 10mg of the atorvastatin. He did take Ruby Mix with Dr. Zaina Gregory and later changed to atorva. He never took wellchol. Did take fenofibrate. No hx tricor, gemfibrozil or vascepa. He is taking 1000mg of fish oil a day. We will go up to 3g a day on the fish oil. Stay on he atrovastatin at 10mg daily and then recheck lipids in 2-3 months. Assessment/Plan:  1. NEETU on CPAP, uses nightly  2. Dyslipidemia- on statin, con at lower dose of 10mg daily atorvastatin  3. ED on viagra  4. CAD- ca score 330, med mgmt for now, stress echo at 60 if asymptomatic. Fhx no early CAD, mother is 80  Soc no tob, etoh on weekends  He  has a past medical history of Anxiety. Cardiovascular ROS: no chest pain or dyspnea on exertion  Respiratory ROS: no cough, shortness of breath, or wheezing  Neurological ROS: no TIA or stroke symptoms  All other systems negative except as above. PE  Vitals:    10/25/17 1047   BP: 130/80   Pulse: 88   Resp: 16   Weight: 199 lb (90.3 kg)   Height: 5' 10.47\" (1.79 m)    Body mass index is 28.17 kg/(m^2).    General appearance - alert, well appearing, and in no distress  Mental status - affect appropriate to mood  Eyes - sclera anicteric, moist mucous membranes  Neck - supple, no significant adenopathy  Lymphatics - no  lymphadenopathy  Chest - clear to auscultation, no wheezes, rales or rhonchi  Heart - normal rate, regular rhythm, normal S1, S2, no murmurs, rubs, clicks or gallops  Abdomen - soft, nontender, nondistended, no masses or organomegaly  Back exam - full range of motion, no tenderness  Neurological - cranial nerves II through XII grossly intact, no focal deficit  Musculoskeletal - no muscular tenderness noted, normal strength  Extremities - peripheral pulses normal, no pedal edema  Skin - normal coloration  no rashes    Recent Labs:  Lab Results   Component Value Date/Time    Cholesterol, total 111 08/30/2017 08:24 AM    HDL Cholesterol 41 08/30/2017 08:24 AM    LDL, calculated 14 08/30/2017 08:24 AM    Triglyceride 280 08/30/2017 08:24 AM     Lab Results   Component Value Date/Time    Creatinine 0.88 08/30/2017 08:24 AM     Lab Results   Component Value Date/Time    BUN 14 08/30/2017 08:24 AM     Lab Results   Component Value Date/Time    Potassium 4.2 08/30/2017 08:24 AM     Lab Results   Component Value Date/Time    Hemoglobin A1c 6.1 08/30/2017 08:24 AM     Lab Results   Component Value Date/Time    HGB 13.8 08/30/2017 08:24 AM     Lab Results   Component Value Date/Time    PLATELET 254 96/86/9374 08:24 AM       Reviewed:  Past Medical History:   Diagnosis Date    Anxiety      History   Smoking Status    Former Smoker    Quit date: 5/20/2015   Smokeless Tobacco    Never Used     History   Alcohol Use    1.0 - 1.5 oz/week    2 - 3 Standard drinks or equivalent per week     Allergies   Allergen Reactions    Morphine Itching    Pcn [Penicillins] Itching       Current Outpatient Prescriptions   Medication Sig    omeprazole (PRILOSEC) 20 mg capsule Take 20 mg by mouth daily.  aspirin delayed-release 81 mg tablet Take  by mouth daily.  atorvastatin (LIPITOR) 20 mg tablet Take 1 Tab by mouth daily. (Patient taking differently: Take 10 mg by mouth daily.)    colchicine (MITIGARE) 0.6 mg capsule By mouth Take 1.2mg once then in 1 hour take 0.6mg (one tab)    indomethacin (INDOCIN) 25 mg capsule Take 1 Cap by mouth three (3) times daily as needed.  With food    testosterone cypionate (DEPOTESTOTERONE CYPIONATE) 200 mg/mL injection by IntraMUSCular route every fourteen (14) days.  sildenafil citrate (VIAGRA) 100 mg tablet Take 1 Tab by mouth as needed.  venlafaxine-SR (EFFEXOR-XR) 75 mg capsule TAKE 1 CAPSULES BY MOUTH EVERY DAY    cetirizine (ZYRTEC) 10 mg tablet Take 10 mg by mouth daily.  omeprazole (PRILOSEC) 40 mg capsule Take 40 mg by mouth daily. No current facility-administered medications for this visit.         Yeimy Mckoy MD  [facility] [place]  [address] [facility] [address]  [address]

## 2017-10-27 RX ORDER — VENLAFAXINE HYDROCHLORIDE 75 MG/1
CAPSULE, EXTENDED RELEASE ORAL
Qty: 90 CAP | Refills: 2 | Status: SHIPPED | OUTPATIENT
Start: 2017-10-27 | End: 2017-11-22 | Stop reason: SDUPTHER

## 2017-11-18 ENCOUNTER — HOSPITAL ENCOUNTER (EMERGENCY)
Age: 57
Discharge: HOME | End: 2017-11-18
Attending: EMERGENCY MEDICINE
Payer: COMMERCIAL

## 2017-11-18 VITALS
HEIGHT: 68 IN | RESPIRATION RATE: 18 BRPM | BODY MASS INDEX: 30.01 KG/M2 | TEMPERATURE: 98 F | WEIGHT: 198 LBS | DIASTOLIC BLOOD PRESSURE: 90 MMHG | SYSTOLIC BLOOD PRESSURE: 156 MMHG | OXYGEN SATURATION: 99 %

## 2017-11-18 DIAGNOSIS — S61.412A LACERATION OF LEFT HAND WITHOUT FOREIGN BODY, INITIAL ENCOUNTER: Primary | ICD-10-CM

## 2017-11-18 PROCEDURE — 74011000250 HC RX REV CODE- 250: Performed by: PHYSICIAN ASSISTANT

## 2017-11-18 PROCEDURE — 74011000250 HC RX REV CODE- 250

## 2017-11-18 PROCEDURE — 77030031132 HC SUT NYL COVD -A

## 2017-11-18 PROCEDURE — L3908 WHO COCK-UP NONMOLDE PRE OTS: HCPCS

## 2017-11-18 PROCEDURE — 99283 EMERGENCY DEPT VISIT LOW MDM: CPT

## 2017-11-18 PROCEDURE — 77030018836 HC SOL IRR NACL ICUM -A

## 2017-11-18 PROCEDURE — 75810000293 HC SIMP/SUPERF WND  RPR

## 2017-11-18 RX ORDER — LIDOCAINE HYDROCHLORIDE 10 MG/ML
10 INJECTION INFILTRATION; PERINEURAL ONCE
Status: COMPLETED | OUTPATIENT
Start: 2017-11-18 | End: 2017-11-18

## 2017-11-18 RX ORDER — LIDOCAINE HYDROCHLORIDE AND EPINEPHRINE 20; 10 MG/ML; UG/ML
10 INJECTION, SOLUTION INFILTRATION; PERINEURAL
Status: DISCONTINUED | OUTPATIENT
Start: 2017-11-18 | End: 2017-11-18

## 2017-11-18 RX ORDER — LIDOCAINE HYDROCHLORIDE 10 MG/ML
INJECTION INFILTRATION; PERINEURAL
Status: COMPLETED
Start: 2017-11-18 | End: 2017-11-18

## 2017-11-18 RX ORDER — BACITRACIN 500 UNIT/G
1 PACKET (EA) TOPICAL
Status: COMPLETED | OUTPATIENT
Start: 2017-11-18 | End: 2017-11-18

## 2017-11-18 RX ADMIN — BACITRACIN 1 PACKET: 500 OINTMENT TOPICAL at 19:33

## 2017-11-18 RX ADMIN — LIDOCAINE HYDROCHLORIDE 10 ML: 10 INJECTION INFILTRATION; PERINEURAL at 19:33

## 2017-11-18 RX ADMIN — LIDOCAINE HYDROCHLORIDE 10 ML: 10 INJECTION, SOLUTION INFILTRATION; PERINEURAL at 19:33

## 2017-11-18 NOTE — ED TRIAGE NOTES
Pt reports he was trimming his cactus and cut the top of his left hand. Bleeding controlled tetanus UTD.

## 2017-11-19 NOTE — DISCHARGE INSTRUCTIONS
Cuts on the Hand Closed With Stitches: Care Instructions  Your Care Instructions    A cut on your hand can be on your fingers, your thumb, or the front or back of your hand. Sometimes a cut can injure the tendons, blood vessels, or nerves of your hand. The doctor used stitches to close the cut. Using stitches also helps the cut heal and reduces scarring. The doctor may have given you a splint to help prevent you from moving your hand, fingers, or thumb. If the cut went deep and through the skin, the doctor put in two layers of stitches. The deeper layer brings the deep part of the cut together. These stitches will dissolve and don't need to be removed. The stitches in the upper layer are the ones you see on the cut. You will probably have a bandage. You will need to have the stitches removed, usually in 7 to 14 days. The doctor may suggest that you see a hand specialist if the cut is very deep or if you have trouble moving your fingers or have less feeling in your hand. The doctor has checked you carefully, but problems can develop later. If you notice any problems or new symptoms, get medical treatment right away. Follow-up care is a key part of your treatment and safety. Be sure to make and go to all appointments, and call your doctor if you are having problems. It's also a good idea to know your test results and keep a list of the medicines you take. How can you care for yourself at home? · Keep the cut dry for the first 24 to 48 hours. After this, you can shower if your doctor okays it. Pat the cut dry. · Don't soak the cut, such as in a bathtub. Your doctor will tell you when it's safe to get the cut wet. · If your doctor told you how to care for your cut, follow your doctor's instructions. If you did not get instructions, follow this general advice:  ¨ After the first 24 to 48 hours, wash around the cut with clean water 2 times a day.  Don't use hydrogen peroxide or alcohol, which can slow healing. ¨ You may cover the cut with a thin layer of petroleum jelly, such as Vaseline, and a nonstick bandage. ¨ Apply more petroleum jelly and replace the bandage as needed. · Prop up the sore hand on a pillow anytime you sit or lie down during the next 3 days. Try to keep it above the level of your heart. This will help reduce swelling. · Avoid any activity that could cause your cut to reopen. · Do not remove the stitches on your own. Your doctor will tell you when to come back to have the stitches removed. · Be safe with medicines. Take pain medicines exactly as directed. ¨ If the doctor gave you a prescription medicine for pain, take it as prescribed. ¨ If you are not taking a prescription pain medicine, ask your doctor if you can take an over-the-counter medicine. When should you call for help? Call your doctor now or seek immediate medical care if:  ? · You have new pain, or your pain gets worse. ? · The skin near the cut is cold or pale or changes color. ? · You have tingling, weakness, or numbness near the cut.   ? · The cut starts to bleed, and blood soaks through the bandage. Oozing small amounts of blood is normal.   ? · You have trouble moving the area of the hand near the cut.   ? · You have symptoms of infection, such as:  ¨ Increased pain, swelling, warmth, or redness around the cut. ¨ Red streaks leading from the cut. ¨ Pus draining from the cut. ¨ A fever. ? Watch closely for changes in your health, and be sure to contact your doctor if:  ? · You do not get better as expected. Where can you learn more? Go to http://sami-alex.info/. Enter T250 in the search box to learn more about \"Cuts on the Hand Closed With Stitches: Care Instructions. \"  Current as of: March 20, 2017  Content Version: 11.4  © 5642-8806 [employer].  Care instructions adapted under license by [employer] (which disclaims liability or warranty for this information). If you have questions about a medical condition or this instruction, always ask your healthcare professional. [place] [address] any warranty or liability for your use of this information.

## 2017-11-19 NOTE — ED PROVIDER NOTES
HPI Comments: 63 yo  male with medical hx remarkable for anxiety presenting with complaint of a laceration to the back of the left hand across the 2nd and 3rd digits about 30 minutes ago. He reports cutting the hand with a sharp knife accidentally while trimming a cactus. FROM. Sensation intact. Applied pressure and elevated the hand. Bleeding controlled. Ambidextrous. No fever, headache, sore throat, cough, rhinorrhea, sneezing, SOB, abdominal pain, nausea, vomiting, or urinary complaints. Patient is a 62 y.o. male presenting with skin laceration. The history is provided by the patient. Laceration    Pertinent negatives include no numbness and no weakness. Past Medical History:   Diagnosis Date    Anxiety        Past Surgical History:   Procedure Laterality Date    HX LUMBAR LAMINECTOMY      HX ORTHOPAEDIC  05/28/15    right humerus    HX TONSILLECTOMY           Family History:   Problem Relation Age of Onset    Family history unknown: Yes       Social History     Social History    Marital status: SINGLE     Spouse name: N/A    Number of children: N/A    Years of education: N/A     Occupational History    Not on file. Social History Main Topics    Smoking status: Former Smoker     Quit date: 5/20/2015    Smokeless tobacco: Never Used    Alcohol use 1.0 - 1.5 oz/week     2 - 3 Standard drinks or equivalent per week    Drug use: No    Sexual activity: Yes     Partners: Male     Other Topics Concern    Not on file     Social History Narrative         ALLERGIES: Morphine and Pcn [penicillins]    Review of Systems   Constitutional: Negative. Negative for chills and fever. HENT: Negative for congestion, ear pain, rhinorrhea, sore throat and voice change. Eyes: Negative. Negative for photophobia, pain and itching. Respiratory: Negative for cough, chest tightness and shortness of breath. Cardiovascular: Negative for chest pain and palpitations. Gastrointestinal: Negative for abdominal distention, abdominal pain, constipation, diarrhea and vomiting. Genitourinary: Negative for difficulty urinating, dysuria, frequency and urgency. Musculoskeletal: Negative for joint swelling and neck stiffness. Skin: Positive for wound. Neurological: Negative for weakness, numbness and headaches. Psychiatric/Behavioral: Negative for confusion and decreased concentration. All other systems reviewed and are negative. Vitals:    11/18/17 1848   BP: 156/90   Resp: 18   Temp: 98 °F (36.7 °C)   SpO2: 99%   Weight: 89.8 kg (198 lb)   Height: 5' 8\" (1.727 m)            Physical Exam   Constitutional: He is oriented to person, place, and time. He appears well-developed and well-nourished. No distress. Well appearing adult male in NAD   HENT:   Head: Normocephalic and atraumatic. Right Ear: External ear normal.   Left Ear: External ear normal.   Nose: Nose normal.   Mouth/Throat: Oropharynx is clear and moist. No oropharyngeal exudate. Eyes: Conjunctivae and EOM are normal. Pupils are equal, round, and reactive to light. Right eye exhibits no discharge. Left eye exhibits no discharge. Neck: Normal range of motion. Neck supple. Cardiovascular: Normal rate, regular rhythm and normal heart sounds. Pulmonary/Chest: Effort normal and breath sounds normal. He has no wheezes. He has no rales. Abdominal: Soft. Bowel sounds are normal. He exhibits no distension. There is no tenderness. There is no guarding. Musculoskeletal: Normal range of motion. Hands:  Lymphadenopathy:     He has no cervical adenopathy. Neurological: He is alert and oriented to person, place, and time. No cranial nerve deficit. Skin: Skin is warm and dry. He is not diaphoretic. Psychiatric: He has a normal mood and affect. His behavior is normal.   Nursing note and vitals reviewed.        MDM  Number of Diagnoses or Management Options  Diagnosis management comments: 63 yo  mal    ED Course       Wound Repair  Date/Time: 11/18/2017 7:10 PM  Performed by: [id number] [facility] provider: Dr. Desmond Francis  Preparation: skin prepped with Betadine  Location: left hand. Wound length:2.6 - 7.5 cm  Anesthesia: local infiltration    Anesthesia:  Local Anesthetic: lidocaine 1% without epinephrine  Anesthetic total: 5 mL  Foreign bodies: no foreign bodies  Irrigation solution: saline  Irrigation method: jet lavage  Debridement: none  Skin closure: 4-0 nylon  Number of sutures: 4  Technique: simple and interrupted  Approximation: close  Dressing: antibiotic ointment, splint and 4x4  Patient tolerance: Patient tolerated the procedure well with no immediate complications  My total time at bedside, performing this procedure was 1-15 minutes. A/P  Left hand laceration: APPLY ICE FOR PAIN/SWELLING. APPLY ANTIBIOTIC OINTMENT 2-3 X DAY. REMOVAL IN 10 DAYS. FOLLOW UP IF ANY REDNESS/SWELLING/DRAINAGE OR SIGNS OF INFECTION.

## 2017-11-19 NOTE — ED NOTES
Discharge instructions given by provider. Pt able to restate instructions. All questions answered. Pt stable for discharge.

## 2017-11-21 ENCOUNTER — OFFICE VISIT (OUTPATIENT)
Dept: FAMILY MEDICINE CLINIC | Age: 57
End: 2017-11-21

## 2017-11-21 ENCOUNTER — DOCUMENTATION ONLY (OUTPATIENT)
Dept: INTERNAL MEDICINE CLINIC | Age: 57
End: 2017-11-21

## 2017-11-21 VITALS
TEMPERATURE: 98.6 F | HEART RATE: 64 BPM | BODY MASS INDEX: 30.01 KG/M2 | DIASTOLIC BLOOD PRESSURE: 101 MMHG | OXYGEN SATURATION: 97 % | HEIGHT: 68 IN | RESPIRATION RATE: 18 BRPM | SYSTOLIC BLOOD PRESSURE: 149 MMHG | WEIGHT: 198 LBS

## 2017-11-21 DIAGNOSIS — S61.412D LACERATION OF LEFT HAND WITHOUT FOREIGN BODY, SUBSEQUENT ENCOUNTER: ICD-10-CM

## 2017-11-21 DIAGNOSIS — L03.114 CELLULITIS OF LEFT UPPER EXTREMITY: Primary | ICD-10-CM

## 2017-11-21 DIAGNOSIS — I10 ESSENTIAL HYPERTENSION: ICD-10-CM

## 2017-11-21 RX ORDER — SULFAMETHOXAZOLE AND TRIMETHOPRIM 800; 160 MG/1; MG/1
1 TABLET ORAL 2 TIMES DAILY
Qty: 20 TAB | Refills: 0 | Status: SHIPPED | OUTPATIENT
Start: 2017-11-21 | End: 2017-11-28

## 2017-11-21 NOTE — PATIENT INSTRUCTIONS
Cuts on the Hand Closed With Stitches: Care Instructions  Your Care Instructions    A cut on your hand can be on your fingers, your thumb, or the front or back of your hand. Sometimes a cut can injure the tendons, blood vessels, or nerves of your hand. The doctor used stitches to close the cut. Using stitches also helps the cut heal and reduces scarring. The doctor may have given you a splint to help prevent you from moving your hand, fingers, or thumb. If the cut went deep and through the skin, the doctor put in two layers of stitches. The deeper layer brings the deep part of the cut together. These stitches will dissolve and don't need to be removed. The stitches in the upper layer are the ones you see on the cut. You will probably have a bandage. You will need to have the stitches removed, usually in 7 to 14 days. The doctor may suggest that you see a hand specialist if the cut is very deep or if you have trouble moving your fingers or have less feeling in your hand. The doctor has checked you carefully, but problems can develop later. If you notice any problems or new symptoms, get medical treatment right away. Follow-up care is a key part of your treatment and safety. Be sure to make and go to all appointments, and call your doctor if you are having problems. It's also a good idea to know your test results and keep a list of the medicines you take. How can you care for yourself at home? · Keep the cut dry for the first 24 to 48 hours. After this, you can shower if your doctor okays it. Pat the cut dry. · Don't soak the cut, such as in a bathtub. Your doctor will tell you when it's safe to get the cut wet. · If your doctor told you how to care for your cut, follow your doctor's instructions. If you did not get instructions, follow this general advice:  ¨ After the first 24 to 48 hours, wash around the cut with clean water 2 times a day.  Don't use hydrogen peroxide or alcohol, which can slow healing. ¨ You may cover the cut with a thin layer of petroleum jelly, such as Vaseline, and a nonstick bandage. ¨ Apply more petroleum jelly and replace the bandage as needed. · Prop up the sore hand on a pillow anytime you sit or lie down during the next 3 days. Try to keep it above the level of your heart. This will help reduce swelling. · Avoid any activity that could cause your cut to reopen. · Do not remove the stitches on your own. Your doctor will tell you when to come back to have the stitches removed. · Be safe with medicines. Take pain medicines exactly as directed. ¨ If the doctor gave you a prescription medicine for pain, take it as prescribed. ¨ If you are not taking a prescription pain medicine, ask your doctor if you can take an over-the-counter medicine. When should you call for help? Call your doctor now or seek immediate medical care if:  ? · You have new pain, or your pain gets worse. ? · The skin near the cut is cold or pale or changes color. ? · You have tingling, weakness, or numbness near the cut.   ? · The cut starts to bleed, and blood soaks through the bandage. Oozing small amounts of blood is normal.   ? · You have trouble moving the area of the hand near the cut.   ? · You have symptoms of infection, such as:  ¨ Increased pain, swelling, warmth, or redness around the cut. ¨ Red streaks leading from the cut. ¨ Pus draining from the cut. ¨ A fever. ? Watch closely for changes in your health, and be sure to contact your doctor if:  ? · You do not get better as expected. Where can you learn more? Go to http://sami-alex.info/. Enter T250 in the search box to learn more about \"Cuts on the Hand Closed With Stitches: Care Instructions. \"  Current as of: March 20, 2017  Content Version: 11.4  © 8021-8355 [employer].  Care instructions adapted under license by [employer] (which disclaims liability or warranty for this information). If you have questions about a medical condition or this instruction, always ask your healthcare professional. [address] any warranty or liability for your use of this information. Cellulitis: Care Instructions  Your Care Instructions    Cellulitis is a skin infection. It often occurs after a break in the skin from a scrape, cut, bite, or puncture, or after a rash. The doctor has checked you carefully, but problems can develop later. If you notice any problems or new symptoms, get medical treatment right away. Follow-up care is a key part of your treatment and safety. Be sure to make and go to all appointments, and call your doctor if you are having problems. It's also a good idea to know your test results and keep a list of the medicines you take. How can you care for yourself at home? · Take your antibiotics as directed. Do not stop taking them just because you feel better. You need to take the full course of antibiotics. · Prop up the infected area on pillows to reduce pain and swelling. Try to keep the area above the level of your heart as often as you can. · If your doctor told you how to care for your wound, follow your doctor's instructions. If you did not get instructions, follow this general advice:  ¨ Wash the wound with clean water 2 times a day. Don't use hydrogen peroxide or alcohol, which can slow healing. ¨ You may cover the wound with a thin layer of petroleum jelly, such as Vaseline, and a nonstick bandage. ¨ Apply more petroleum jelly and replace the bandage as needed. · Be safe with medicines. Take pain medicines exactly as directed. ¨ If the doctor gave you a prescription medicine for pain, take it as prescribed. ¨ If you are not taking a prescription pain medicine, ask your doctor if you can take an over-the-counter medicine. To prevent cellulitis in the future  · Try to prevent cuts, scrapes, or other injuries to your skin.  Cellulitis most often occurs where there is a break in the skin. · If you get a scrape, cut, mild burn, or bite, wash the wound with clean water as soon as you can to help avoid infection. Don't use hydrogen peroxide or alcohol, which can slow healing. · If you have swelling in your legs (edema), support stockings and good skin care may help prevent leg sores and cellulitis. · Take care of your feet, especially if you have diabetes or other conditions that increase the risk of infection. Wear shoes and socks. Do not go barefoot. If you have athlete's foot or other skin problems on your feet, talk to your doctor about how to treat them. When should you call for help? Call your doctor now or seek immediate medical care if:  ? · You have signs that your infection is getting worse, such as:  ¨ Increased pain, swelling, warmth, or redness. ¨ Red streaks leading from the area. ¨ Pus draining from the area. ¨ A fever. ? · You get a rash. ? Watch closely for changes in your health, and be sure to contact your doctor if:  ? · You are not getting better after 1 day (24 hours). ? · You do not get better as expected. Where can you learn more? Go to http://sami-alex.info/. Arian Wilson in the search box to learn more about \"Cellulitis: Care Instructions. \"  Current as of: October 13, 2016  Content Version: 11.4  © 9227-1292 [employer]. Care instructions adapted under license by [employer] (which disclaims liability or warranty for this information). If you have questions about a medical condition or this instruction, always ask your healthcare professional. [address] any warranty or liability for your use of this information.

## 2017-11-21 NOTE — PROGRESS NOTES
Subjective:      Kojo Conte is an 62 y.o. male who presents for evaluation of a laceration to the left hand. Injury occurred 3 days ago. The mechanism of the wound was knife, clean. The ED did the initial care and 5 sutures are clean, dry and intact. The patient reports pain and redness in the area that has increased over the past 24-48 hours. He also reports increased puffiness around the area and this am it had some tan drainage which is new. There were no other injuries. Patient denies numbness. The tetanus status is up to date. Objective:        Visit Vitals    BP (!) 149/101 (BP 1 Location: Right arm, BP Patient Position: Sitting)  Comment: pt is upset    Pulse 64    Temp 98.6 °F (37 °C) (Oral)    Resp 18    Ht 5' 8\" (1.727 m)    Wt 198 lb (89.8 kg)    SpO2 97%    BMI 30.11 kg/m2     There is a linear laceration measuring approximately 4 cm in length on the left hand. Examination of the wound for foreign bodies and devitalized tissue showed none. Examination of the surrounding area for neural or vascular damage showed none. The area does have a slight redness and swelling, and the patient is traveling to [facility] for the holiday weekend, leaving tomorrow. Assessment/Plan:   Laceration as described above. Tetanus immunization indicated: yes  Wound care discussed. Systemic antibiotics for 7 days. Suture removal in 4 days. ICD-10-CM ICD-9-CM    1. Laceration of left hand without foreign body, subsequent encounter S61.412D V58.89 trimethoprim-sulfamethoxazole (BACTRIM DS, SEPTRA DS) 160-800 mg per tablet   2. Cellulitis of left upper extremity L03.114 682.3 trimethoprim-sulfamethoxazole (BACTRIM DS, SEPTRA DS) 160-800 mg per tablet   Hypertension noted, Patient states he was upset and under increased stress currently, he will follow-up and recheck BP frequently and report increased levels to PCP.

## 2017-11-21 NOTE — PROGRESS NOTES
Chief Complaint   Patient presents with    Hand Injury     cut to top of left hand 3 days ago, seen in ED and has stitches, now red and swollen

## 2017-11-21 NOTE — MR AVS SNAPSHOT
Visit Information Date & Time Provider Department Dept. Phone Encounter #  
 11/21/2017  4:00 PM Bro Coy, [address] [facility] [phone] [id number] Follow-up Instructions Return if symptoms worsen or fail to improve. Your Appointments 12/6/2017  8:45 AM  
ROUTINE CARE with Shania Burger MD  
[address] [facility] Internal Medicine [address] [facility]) Appt Note: 3 month f/u  
 [address]  [address] [place]  
[address] [id number] [facility] [address] [place]  
  
    
 10/25/2018 10:00 AM  
ESTABLISHED PATIENT with Kelly Holliday MD  
[facility] ([address]) Appt Note: 1 yr f/u per Dr. Indio Dewitt [address]  [address] [place] [address]  
[address] [facility],[address] [address] Upcoming Health Maintenance Date Due COLONOSCOPY 9/26/2023 DTaP/Tdap/Td series (2 - Td) 4/6/2025 Allergies as of 11/21/2017  Review Complete On: 11/21/2017 By: Jocelyne Sanchez LPN Severity Noted Reaction Type Reactions Morphine  12/16/2014    Itching Pcn [Penicillins]  12/16/2014    Itching Current Immunizations  Never Reviewed No immunizations on file. Not reviewed this visit You Were Diagnosed With   
  
 Codes Comments Laceration of left hand without foreign body, subsequent encounter    -  Primary ICD-10-CM: L88.376A ICD-9-CM: V58.89 Cellulitis of left upper extremity     ICD-10-CM: L03.114 
ICD-9-CM: 682. 3 Vitals BP Pulse Temp Resp Height(growth percentile) Weight(growth percentile) (!) 149/101 (BP 1 Location: Right arm, BP Patient Position: Sitting) 64 98.6 °F (37 °C) (Oral) 18 5' 8\" (1.727 m) 198 lb (89.8 kg) SpO2 BMI Smoking Status 97% 30.11 kg/m2 Former Smoker BMI and BSA Data  Body Mass Index Body Surface Area  
 30.11 kg/m 2 2.08 m 2  
  
  
 Preferred Pharmacy Pharmacy Name Phone [facility]/PHARMACY #[id number] [facility]ginnis [address] [facility]. [phone] Your Updated Medication List  
  
   
This list is accurate as of: 11/21/17  4:16 PM.  Always use your most recent med list.  
  
  
  
  
 aspirin delayed-release 81 mg tablet Take  by mouth daily. atorvastatin 20 mg tablet Commonly known as:  LIPITOR Take 1 Tab by mouth daily. colchicine 0.6 mg capsule Commonly known as:  Deannie Hazard By mouth Take 1.2mg once then in 1 hour take 0.6mg (one tab)  
  
 indomethacin 25 mg capsule Commonly known as:  INDOCIN Take 1 Cap by mouth three (3) times daily as needed. With food * omeprazole 40 mg capsule Commonly known as:  PRILOSEC Take 40 mg by mouth daily. * omeprazole 20 mg capsule Commonly known as:  PRILOSEC Take 20 mg by mouth daily. sildenafil citrate 100 mg tablet Commonly known as:  VIAGRA Take 1 Tab by mouth as needed. testosterone cypionate 200 mg/mL injection Commonly known as:  DEPOTESTOTERONE CYPIONATE  
by IntraMUSCular route every fourteen (14) days. trimethoprim-sulfamethoxazole 160-800 mg per tablet Commonly known as:  BACTRIM DS, SEPTRA DS Take 1 Tab by mouth two (2) times a day for 10 days. venlafaxine-SR 75 mg capsule Commonly known as:  EFFEXOR-XR  
TAKE 1 CAPSULES BY MOUTH EVERY DAY ZyrTEC 10 mg tablet Generic drug:  cetirizine Take 10 mg by mouth daily. * Notice: This list has 2 medication(s) that are the same as other medications prescribed for you. Read the directions carefully, and ask your doctor or other care provider to review them with you. Prescriptions Sent to Pharmacy Refills  
 trimethoprim-sulfamethoxazole (BACTRIM DS, SEPTRA DS) 160-800 mg per tablet 0 Sig: Take 1 Tab by mouth two (2) times a day for 10 days.   
 Class: Normal  
 Pharmacy: [facility]/pharmacy #[id number] SIDDIQUI, [address] [facility]. Ph #: [phone] Route: Oral  
  
Follow-up Instructions Return if symptoms worsen or fail to improve. Patient Instructions Cuts on the Hand Closed With Stitches: Care Instructions Your Care Instructions A cut on your hand can be on your fingers, your thumb, or the front or back of your hand. Sometimes a cut can injure the tendons, blood vessels, or nerves of your hand. The doctor used stitches to close the cut. Using stitches also helps the cut heal and reduces scarring. The doctor may have given you a splint to help prevent you from moving your hand, fingers, or thumb. If the cut went deep and through the skin, the doctor put in two layers of stitches. The deeper layer brings the deep part of the cut together. These stitches will dissolve and don't need to be removed. The stitches in the upper layer are the ones you see on the cut. You will probably have a bandage. You will need to have the stitches removed, usually in 7 to 14 days. The doctor may suggest that you see a hand specialist if the cut is very deep or if you have trouble moving your fingers or have less feeling in your hand. The doctor has checked you carefully, but problems can develop later. If you notice any problems or new symptoms, get medical treatment right away. Follow-up care is a key part of your treatment and safety. Be sure to make and go to all appointments, and call your doctor if you are having problems. It's also a good idea to know your test results and keep a list of the medicines you take. How can you care for yourself at home? · Keep the cut dry for the first 24 to 48 hours. After this, you can shower if your doctor okays it. Pat the cut dry. · Don't soak the cut, such as in a bathtub. Your doctor will tell you when it's safe to get the cut wet. · If your doctor told you how to care for your cut, follow your doctor's instructions. If you did not get instructions, follow this general advice: ¨ After the first 24 to 48 hours, wash around the cut with clean water 2 times a day. Don't use hydrogen peroxide or alcohol, which can slow healing. ¨ You may cover the cut with a thin layer of petroleum jelly, such as Vaseline, and a nonstick bandage. ¨ Apply more petroleum jelly and replace the bandage as needed. · Prop up the sore hand on a pillow anytime you sit or lie down during the next 3 days. Try to keep it above the level of your heart. This will help reduce swelling. · Avoid any activity that could cause your cut to reopen. · Do not remove the stitches on your own. Your doctor will tell you when to come back to have the stitches removed. · Be safe with medicines. Take pain medicines exactly as directed. ¨ If the doctor gave you a prescription medicine for pain, take it as prescribed. ¨ If you are not taking a prescription pain medicine, ask your doctor if you can take an over-the-counter medicine. When should you call for help? Call your doctor now or seek immediate medical care if: 
? · You have new pain, or your pain gets worse. ? · The skin near the cut is cold or pale or changes color. ? · You have tingling, weakness, or numbness near the cut.  
? · The cut starts to bleed, and blood soaks through the bandage. Oozing small amounts of blood is normal.  
? · You have trouble moving the area of the hand near the cut.  
? · You have symptoms of infection, such as: 
¨ Increased pain, swelling, warmth, or redness around the cut. ¨ Red streaks leading from the cut. ¨ Pus draining from the cut. ¨ A fever. ? Watch closely for changes in your health, and be sure to contact your doctor if: 
? · You do not get better as expected. Where can you learn more? Go to http://sami-alex.info/. Enter T250 in the search box to learn more about \"Cuts on the Hand Closed With Stitches: Care Instructions. \" Current as of: March 20, 2017 Content Version: 11.4 © 6096-2978 Parkplatzking. Care instructions adapted under license by Aqu[employer] B[employer] (which disclaims liability or warranty for this information). If you have questions about a medical condition or this instruction, always ask your healthcare professional. [address] any warranty or liability for your use of this information. Cellulitis: Care Instructions Your Care Instructions Cellulitis is a skin infection. It often occurs after a break in the skin from a scrape, cut, bite, or puncture, or after a rash. The doctor has checked you carefully, but problems can develop later. If you notice any problems or new symptoms, get medical treatment right away. Follow-up care is a key part of your treatment and safety. Be sure to make and go to all appointments, and call your doctor if you are having problems. It's also a good idea to know your test results and keep a list of the medicines you take. How can you care for yourself at home? · Take your antibiotics as directed. Do not stop taking them just because you feel better. You need to take the full course of antibiotics. · Prop up the infected area on pillows to reduce pain and swelling. Try to keep the area above the level of your heart as often as you can. · If your doctor told you how to care for your wound, follow your doctor's instructions. If you did not get instructions, follow this general advice: ¨ Wash the wound with clean water 2 times a day. Don't use hydrogen peroxide or alcohol, which can slow healing. ¨ You may cover the wound with a thin layer of petroleum jelly, such as Vaseline, and a nonstick bandage. ¨ Apply more petroleum jelly and replace the bandage as needed. · Be safe with medicines. Take pain medicines exactly as directed. ¨ If the doctor gave you a prescription medicine for pain, take it as prescribed.  
¨ If you are not taking a prescription pain medicine, ask your doctor if you can take an over-the-counter medicine. To prevent cellulitis in the future · Try to prevent cuts, scrapes, or other injuries to your skin. Cellulitis most often occurs where there is a break in the skin. · If you get a scrape, cut, mild burn, or bite, wash the wound with clean water as soon as you can to help avoid infection. Don't use hydrogen peroxide or alcohol, which can slow healing. · If you have swelling in your legs (edema), support stockings and good skin care may help prevent leg sores and cellulitis. · Take care of your feet, especially if you have diabetes or other conditions that increase the risk of infection. Wear shoes and socks. Do not go barefoot. If you have athlete's foot or other skin problems on your feet, talk to your doctor about how to treat them. When should you call for help? Call your doctor now or seek immediate medical care if: 
? · You have signs that your infection is getting worse, such as: 
¨ Increased pain, swelling, warmth, or redness. ¨ Red streaks leading from the area. ¨ Pus draining from the area. ¨ A fever. ? · You get a rash. ? Watch closely for changes in your health, and be sure to contact your doctor if: 
? · You are not getting better after 1 day (24 hours). ? · You do not get better as expected. Where can you learn more? Go to http://sami-alex.info/. Israel Willams in the search box to learn more about \"Cellulitis: Care Instructions. \" Current as of: October 13, 2016 Content Version: 11.4 © 8223-1456 Nok Nok Labs. Care instructions adapted under license by Plated (which disclaims liability or warranty for this information). If you have questions about a medical condition or this instruction, always ask your healthcare professional. [place] [address] any warranty or liability for your use of this information. Introducing [facility] & HEALTH SERVICES! Dear Joel Rodriguezsin: Thank you for requesting a VirtualQube account. Our records indicate that you already have an active VirtualQube account. You can access your account anytime at [email or web address] Did you know that you can access your hospital and ER discharge instructions at any time in VirtualQube? You can also review all of your test results from your hospital stay or ER visit. Additional Information If you have questions, please visit the Frequently Asked Questions section of the VirtualQube website at [email or web address] Remember, VirtualQube is NOT to be used for urgent needs. For medical emergencies, dial 911. Now available from your iPhone and Android! Please provide this summary of care documentation to your next provider. Your primary care clinician is listed as Media Kudo. If you have any questions after today's visit, please call [phone].

## 2017-11-22 DIAGNOSIS — E78.1 PURE HYPERGLYCERIDEMIA: ICD-10-CM

## 2017-11-24 RX ORDER — ATORVASTATIN CALCIUM 10 MG/1
10 TABLET, FILM COATED ORAL DAILY
Qty: 90 TAB | Refills: 1 | Status: SHIPPED | COMMUNITY
Start: 2017-11-24 | End: 2018-04-23 | Stop reason: SDUPTHER

## 2017-11-24 RX ORDER — VENLAFAXINE HYDROCHLORIDE 75 MG/1
CAPSULE, EXTENDED RELEASE ORAL
Qty: 90 CAP | Refills: 1 | Status: SHIPPED | OUTPATIENT
Start: 2017-11-24 | End: 2018-05-11 | Stop reason: SDUPTHER

## 2017-11-28 ENCOUNTER — OFFICE VISIT (OUTPATIENT)
Dept: FAMILY MEDICINE CLINIC | Age: 57
End: 2017-11-28

## 2017-11-28 VITALS
DIASTOLIC BLOOD PRESSURE: 83 MMHG | OXYGEN SATURATION: 98 % | HEART RATE: 71 BPM | RESPIRATION RATE: 16 BRPM | TEMPERATURE: 97.9 F | BODY MASS INDEX: 30.55 KG/M2 | WEIGHT: 201.6 LBS | HEIGHT: 68 IN | SYSTOLIC BLOOD PRESSURE: 127 MMHG

## 2017-11-28 DIAGNOSIS — L03.113 CELLULITIS OF RIGHT UPPER EXTREMITY: Primary | ICD-10-CM

## 2017-11-28 RX ORDER — DOXYCYCLINE 100 MG/1
100 CAPSULE ORAL 2 TIMES DAILY
Qty: 20 CAP | Refills: 0 | Status: SHIPPED | OUTPATIENT
Start: 2017-11-28 | End: 2017-12-08

## 2017-11-28 RX ORDER — MUPIROCIN 20 MG/G
OINTMENT TOPICAL DAILY
Qty: 30 G | Refills: 1 | Status: SHIPPED | OUTPATIENT
Start: 2017-11-28 | End: 2018-02-01

## 2017-11-28 NOTE — MR AVS SNAPSHOT
Visit Information Date & Time Provider Department Dept. Phone Encounter #  
 11/28/2017  9:30 AM Tay Wiley, [address] [facility] [phone] [id number] Follow-up Instructions Return if symptoms worsen or fail to improve. Your Appointments 12/6/2017  8:45 AM  
ROUTINE CARE with Avinash Nolasco MD  
[facility] Internal Medicine [address] [facility]) Appt Note: 3 month f/u  
 [address]  [address] [place] [address]  
[address] [address] [facility] [address] [place]  
  
    
 10/25/2018 10:00 AM  
ESTABLISHED PATIENT with Yeimy Mckoy MD  
[facility] ([address] [facility]) Appt Note: 1 yr f/u per Dr. Renita Zaldivar [address]  [address] [place] [address]  
[address] [address] Upcoming Health Maintenance Date Due COLONOSCOPY 9/26/2023 DTaP/Tdap/Td series (2 - Td) 4/6/2025 Allergies as of 11/28/2017  Review Complete On: 11/28/2017 By: Kvng Albert LPN Severity Noted Reaction Type Reactions Morphine  12/16/2014    Itching Pcn [Penicillins]  12/16/2014    Itching Current Immunizations  Never Reviewed No immunizations on file. Not reviewed this visit You Were Diagnosed With   
  
 Codes Comments Cellulitis of right upper extremity    -  Primary ICD-10-CM: U62.994 ICD-9-CM: 599. 3 Vitals BP Pulse Temp Resp Height(growth percentile) Weight(growth percentile) 127/83 71 97.9 °F (36.6 °C) (Oral) 16 5' 8\" (1.727 m) 201 lb 9.6 oz (91.4 kg) SpO2 BMI Smoking Status 98% 30.65 kg/m2 Former Smoker Vitals History BMI and BSA Data Body Mass Index Body Surface Area  
 30.65 kg/m 2 2.09 m 2 Preferred Pharmacy Pharmacy Name Phone [id number] Risk, [address] [facility] [phone] Your Updated Medication List  
 This list is accurate as of: 11/28/17 10:15 AM.  Always use your most recent med list.  
  
  
  
  
 aspirin delayed-release 81 mg tablet Take  by mouth daily. atorvastatin 10 mg tablet Commonly known as:  LIPITOR Take 1 Tab by mouth daily. colchicine 0.6 mg capsule Commonly known as:  Damion Wright By mouth Take 1.2mg once then in 1 hour take 0.6mg (one tab) doxycycline 100 mg capsule Commonly known as:  Allie Jr Take 1 Cap by mouth two (2) times a day for 10 days. indomethacin 25 mg capsule Commonly known as:  INDOCIN Take 1 Cap by mouth three (3) times daily as needed. With food  
  
 mupirocin 2 % ointment Commonly known as:  [employer] Apply  to affected area daily. omeprazole 20 mg capsule Commonly known as:  PRILOSEC Take 20 mg by mouth daily. sildenafil citrate 100 mg tablet Commonly known as:  VIAGRA Take 1 Tab by mouth as needed. testosterone cypionate 200 mg/mL injection Commonly known as:  DEPOTESTOTERONE CYPIONATE  
by IntraMUSCular route every fourteen (14) days. venlafaxine-SR 75 mg capsule Commonly known as:  EFFEXOR-XR  
TAKE 1 CAPSULES BY MOUTH EVERY DAY ZyrTEC 10 mg tablet Generic drug:  cetirizine Take 10 mg by mouth daily. Prescriptions Sent to Pharmacy Refills  
 doxycycline (MONODOX) 100 mg capsule 0 Sig: Take 1 Cap by mouth two (2) times a day for 10 days. Class: Normal  
 Pharmacy: [address]. Ph #: [phone] Route: Oral  
 mupirocin (BACTROBAN) 2 % ointment 1 Sig: Apply  to affected area daily. Class: Normal  
 Pharmacy: [address]. Ph #: [phone] Route: Topical  
  
Follow-up Instructions Return if symptoms worsen or fail to improve. Patient Instructions Cellulitis: Care Instructions Your Care Instructions Cellulitis is a skin infection. It often occurs after a break in the skin from a scrape, cut, bite, or puncture, or after a rash. The doctor has checked you carefully, but problems can develop later. If you notice any problems or new symptoms, get medical treatment right away. Follow-up care is a key part of your treatment and safety. Be sure to make and go to all appointments, and call your doctor if you are having problems. It's also a good idea to know your test results and keep a list of the medicines you take. How can you care for yourself at home? · Take your antibiotics as directed. Do not stop taking them just because you feel better. You need to take the full course of antibiotics. · Prop up the infected area on pillows to reduce pain and swelling. Try to keep the area above the level of your heart as often as you can. · If your doctor told you how to care for your wound, follow your doctor's instructions. If you did not get instructions, follow this general advice: ¨ Wash the wound with clean water 2 times a day. Don't use hydrogen peroxide or alcohol, which can slow healing. ¨ You may cover the wound with a thin layer of petroleum jelly, such as Vaseline, and a nonstick bandage. ¨ Apply more petroleum jelly and replace the bandage as needed. · Be safe with medicines. Take pain medicines exactly as directed. ¨ If the doctor gave you a prescription medicine for pain, take it as prescribed. ¨ If you are not taking a prescription pain medicine, ask your doctor if you can take an over-the-counter medicine. To prevent cellulitis in the future · Try to prevent cuts, scrapes, or other injuries to your skin. Cellulitis most often occurs where there is a break in the skin. · If you get a scrape, cut, mild burn, or bite, wash the wound with clean water as soon as you can to help avoid infection. Don't use hydrogen peroxide or alcohol, which can slow healing. · If you have swelling in your legs (edema), support stockings and good skin care may help prevent leg sores and cellulitis. · Take care of your feet, especially if you have diabetes or other conditions that increase the risk of infection. Wear shoes and socks. Do not go barefoot. If you have athlete's foot or other skin problems on your feet, talk to your doctor about how to treat them. When should you call for help? Call your doctor now or seek immediate medical care if: 
? · You have signs that your infection is getting worse, such as: 
¨ Increased pain, swelling, warmth, or redness. ¨ Red streaks leading from the area. ¨ Pus draining from the area. ¨ A fever. ? · You get a rash. ? Watch closely for changes in your health, and be sure to contact your doctor if: 
? · You are not getting better after 1 day (24 hours). ? · You do not get better as expected. Where can you learn more? Go to http://sami-alex.info/. Sally Manley in the search box to learn more about \"Cellulitis: Care Instructions. \" Current as of: October 13, 2016 Content Version: 11.4 © 1879-5280 IDx. Care instructions adapted under license by Sanaexpert (which disclaims liability or warranty for this information). If you have questions about a medical condition or this instruction, always ask your healthcare professional. [place] [address] any warranty or liability for your use of this information. Introducing [facility] & HEALTH SERVICES! Dear Erum Mcclain: Thank you for requesting a Attunity account. Our records indicate that you already have an active Attunity account. You can access your account anytime at [email or web address] Did you know that you can access your hospital and ER discharge instructions at any time in Attunity? You can also review all of your test results from your hospital stay or ER visit. Additional Information If you have questions, please visit the Frequently Asked Questions section of the Infrasoft Technologieshart website at [email or web address] Remember, Codarica is NOT to be used for urgent needs. For medical emergencies, dial 911. Now available from your iPhone and Android! Please provide this summary of care documentation to your next provider. Your primary care clinician is listed as Brenda Stagers. If you have any questions after today's visit, please call [phone].

## 2017-11-28 NOTE — PROGRESS NOTES
Chief Complaint   Patient presents with    Follow-up     Follow up from and visit on Tuesday a week ago, stitches are out, not completely closed per patient and looks infected

## 2017-11-28 NOTE — PROGRESS NOTES
Subjective:      Mehul Webster is an 62 y.o. male who presents for evaluation of a probable skin infection located on the left hand in a laceration that He had sutures placed 10 days ago after an accident with a sharp knife; He was cutting a limb off of a cactus plant and the knife slipped hitting the back of his left hand. The laceration was clean and seemed to be approximating well, then 3 days later it was red and had some swelling around the cut. I placed him on Bactrim DS 1 tab po BID, as he was leaving to go out of town and was very concerned about infection. While he was away, the area did not get much worse, however it did not appear that the edges were staying together. He states he had a friend remove the Sutures today (Origionally placed in the ED)  Onset of symptoms was abrupt, with gradually worsening since that time. Symptoms include erythema. Patient denies  tenderness, drainage, fever and chills. There is a history trauma to the area. Treatment to date has included elevation of the area and antibiotics started 6 days ago with minimal relief. Patient Active Problem List   Diagnosis Code    Anxiety F41.9    HLD (hyperlipidemia) E78.5    GERD (gastroesophageal reflux disease) K21.9    PND (post-nasal drip) R09.82    NEETU (obstructive sleep apnea) G47.33    Low testosterone E34.9    PAD (peripheral artery disease) ([facility]) I73.9     Patient Active Problem List    Diagnosis Date Noted    [facility] 10/25/2017    Low testosterone 08/09/2017    NEETU (obstructive sleep apnea) 03/29/2016    Anxiety 12/16/2014    HLD (hyperlipidemia) 12/16/2014    GERD (gastroesophageal reflux disease) 12/16/2014    PND (post-nasal drip) 12/16/2014     Current Outpatient Prescriptions   Medication Sig Dispense Refill    doxycycline (MONODOX) 100 mg capsule Take 1 Cap by mouth two (2) times a day for 10 days.  20 Cap 0    mupirocin (BACTROBAN) 2 % ointment Apply  to affected area daily. 30 g 1    venlafaxine-SR (EFFEXOR-XR) 75 mg capsule TAKE 1 CAPSULES BY MOUTH EVERY DAY 90 Cap 1    atorvastatin (LIPITOR) 10 mg tablet Take 1 Tab by mouth daily. 90 Tab 1    omeprazole (PRILOSEC) 20 mg capsule Take 20 mg by mouth daily.  aspirin delayed-release 81 mg tablet Take  by mouth daily.  colchicine (MITIGARE) 0.6 mg capsule By mouth Take 1.2mg once then in 1 hour take 0.6mg (one tab) 12 Cap 0    indomethacin (INDOCIN) 25 mg capsule Take 1 Cap by mouth three (3) times daily as needed. With food 30 Cap 1    testosterone cypionate (DEPOTESTOTERONE CYPIONATE) 200 mg/mL injection by IntraMUSCular route every fourteen (14) days. 0 Vial 0    sildenafil citrate (VIAGRA) 100 mg tablet Take 1 Tab by mouth as needed. 8 Tab 2    cetirizine (ZYRTEC) 10 mg tablet Take 10 mg by mouth daily. Allergies   Allergen Reactions    Morphine Itching    Pcn [Penicillins] Itching     Past Medical History:   Diagnosis Date    Anxiety      Past Surgical History:   Procedure Laterality Date    HX LUMBAR LAMINECTOMY      HX ORTHOPAEDIC  05/28/15    right humerus    HX TONSILLECTOMY       Family History   Problem Relation Age of Onset    Family history unknown: Yes     Social History   Substance Use Topics    Smoking status: Former Smoker     Quit date: 5/20/2015    Smokeless tobacco: Never Used    Alcohol use 1.0 - 1.5 oz/week     2 - 3 Standard drinks or equivalent per week        Objective:     Visit Vitals    /83    Pulse 71    Temp 97.9 °F (36.6 °C) (Oral)    Resp 16    Ht 5' 8\" (1.727 m)    Wt 201 lb 9.6 oz (91.4 kg)    SpO2 98%    BMI 30.65 kg/m2     General appearance: alert, well appearing, and in no distress. Skin exam: cellulitis and erythema noted on the left hand at the site of laceration which is pulling open and it appears that the flap is swollen, red, and may not be viable. The wound bed is visible and has some yellow slough.     Assessment/Plan:     cellulitis as described  I do not believe this to be a high risk lesion for MRSA. Thus I am treating the patient with doxycycline. Antibiotics given. I have reviewed wound care with antibiotic ointment and Band-Aid,    ICD-10-CM ICD-9-CM    1.  Cellulitis of right upper extremity L03.113 682.3 doxycycline (MONODOX) 100 mg capsule      mupirocin (BACTROBAN) 2 % ointment

## 2017-11-28 NOTE — PATIENT INSTRUCTIONS
Cellulitis: Care Instructions  Your Care Instructions    Cellulitis is a skin infection. It often occurs after a break in the skin from a scrape, cut, bite, or puncture, or after a rash. The doctor has checked you carefully, but problems can develop later. If you notice any problems or new symptoms, get medical treatment right away. Follow-up care is a key part of your treatment and safety. Be sure to make and go to all appointments, and call your doctor if you are having problems. It's also a good idea to know your test results and keep a list of the medicines you take. How can you care for yourself at home? · Take your antibiotics as directed. Do not stop taking them just because you feel better. You need to take the full course of antibiotics. · Prop up the infected area on pillows to reduce pain and swelling. Try to keep the area above the level of your heart as often as you can. · If your doctor told you how to care for your wound, follow your doctor's instructions. If you did not get instructions, follow this general advice:  ¨ Wash the wound with clean water 2 times a day. Don't use hydrogen peroxide or alcohol, which can slow healing. ¨ You may cover the wound with a thin layer of petroleum jelly, such as Vaseline, and a nonstick bandage. ¨ Apply more petroleum jelly and replace the bandage as needed. · Be safe with medicines. Take pain medicines exactly as directed. ¨ If the doctor gave you a prescription medicine for pain, take it as prescribed. ¨ If you are not taking a prescription pain medicine, ask your doctor if you can take an over-the-counter medicine. To prevent cellulitis in the future  · Try to prevent cuts, scrapes, or other injuries to your skin. Cellulitis most often occurs where there is a break in the skin. · If you get a scrape, cut, mild burn, or bite, wash the wound with clean water as soon as you can to help avoid infection.  Don't use hydrogen peroxide or alcohol, which can slow healing. · If you have swelling in your legs (edema), support stockings and good skin care may help prevent leg sores and cellulitis. · Take care of your feet, especially if you have diabetes or other conditions that increase the risk of infection. Wear shoes and socks. Do not go barefoot. If you have athlete's foot or other skin problems on your feet, talk to your doctor about how to treat them. When should you call for help? Call your doctor now or seek immediate medical care if:  ? · You have signs that your infection is getting worse, such as:  ¨ Increased pain, swelling, warmth, or redness. ¨ Red streaks leading from the area. ¨ Pus draining from the area. ¨ A fever. ? · You get a rash. ? Watch closely for changes in your health, and be sure to contact your doctor if:  ? · You are not getting better after 1 day (24 hours). ? · You do not get better as expected. Where can you learn more? Go to http://sami-alex.info/. Juan Pablo Reid in the search box to learn more about \"Cellulitis: Care Instructions. \"  Current as of: October 13, 2016  Content Version: 11.4  © 8898-7580 [employer]. Care instructions adapted under license by [address] (which disclaims liability or warranty for this information). If you have questions about a medical condition or this instruction, always ask your healthcare professional. [place] [address] any warranty or liability for your use of this information.

## 2018-01-15 DIAGNOSIS — K21.9 GASTROESOPHAGEAL REFLUX DISEASE WITHOUT ESOPHAGITIS: ICD-10-CM

## 2018-01-15 DIAGNOSIS — R73.09 ELEVATED HEMOGLOBIN A1C: ICD-10-CM

## 2018-01-15 DIAGNOSIS — E78.2 MIXED HYPERLIPIDEMIA: Primary | ICD-10-CM

## 2018-01-17 DIAGNOSIS — R79.89 LOW TESTOSTERONE: Primary | ICD-10-CM

## 2018-02-01 ENCOUNTER — OFFICE VISIT (OUTPATIENT)
Dept: INTERNAL MEDICINE CLINIC | Age: 58
End: 2018-02-01

## 2018-02-01 ENCOUNTER — HOSPITAL ENCOUNTER (OUTPATIENT)
Dept: GENERAL RADIOLOGY | Age: 58
Discharge: HOME | End: 2018-02-01
Payer: COMMERCIAL

## 2018-02-01 VITALS
SYSTOLIC BLOOD PRESSURE: 144 MMHG | HEIGHT: 68 IN | DIASTOLIC BLOOD PRESSURE: 98 MMHG | TEMPERATURE: 98 F | BODY MASS INDEX: 31.71 KG/M2 | HEART RATE: 77 BPM | WEIGHT: 209.2 LBS | RESPIRATION RATE: 16 BRPM | OXYGEN SATURATION: 97 %

## 2018-02-01 DIAGNOSIS — E78.2 MIXED HYPERLIPIDEMIA: ICD-10-CM

## 2018-02-01 DIAGNOSIS — M10.072 ACUTE IDIOPATHIC GOUT OF LEFT FOOT: Primary | ICD-10-CM

## 2018-02-01 DIAGNOSIS — M79.89 FOOT SWELLING: ICD-10-CM

## 2018-02-01 DIAGNOSIS — I25.10 CORONARY ARTERY DISEASE INVOLVING NATIVE CORONARY ARTERY OF NATIVE HEART WITHOUT ANGINA PECTORIS: ICD-10-CM

## 2018-02-01 DIAGNOSIS — N52.1 ERECTILE DYSFUNCTION DUE TO DISEASES CLASSIFIED ELSEWHERE: ICD-10-CM

## 2018-02-01 DIAGNOSIS — I73.9 PAD (PERIPHERAL ARTERY DISEASE) (HCC): ICD-10-CM

## 2018-02-01 PROCEDURE — 73630 X-RAY EXAM OF FOOT: CPT

## 2018-02-01 RX ORDER — PREDNISONE 10 MG/1
20 TABLET ORAL SEE ADMIN INSTRUCTIONS
Qty: 21 TAB | Refills: 0 | Status: SHIPPED | OUTPATIENT
Start: 2018-02-01 | End: 2018-03-05

## 2018-02-01 RX ORDER — CLINDAMYCIN HYDROCHLORIDE 150 MG/1
CAPSULE ORAL EVERY 6 HOURS
COMMUNITY
End: 2018-03-05

## 2018-02-01 NOTE — PROGRESS NOTES
Chief Complaint   Patient presents with    Cholesterol Problem     1. Have you been to the ER, urgent care clinic since your last visit? Hospitalized since your last visit? Yes Where: ER Reason for visit: Cut hand    2. Have you seen or consulted any other health care providers outside of the [address] [place] [address] since your last visit? Include any pap smears or colon screening.  Cardiologist/ Dr Simona Rea

## 2018-02-01 NOTE — PROGRESS NOTES
HISTORY OF PRESENT ILLNESS  Sue Gallegos is a 62 y.o. male. Gout   This is a recurrent problem. The current episode started more than 2 days ago (6 days ). The problem has been gradually worsening. Pertinent negatives include no chest pain, no headaches and no shortness of breath. Associated symptoms comments: Started underneath foot. Graduadully worsening. Swollen and red. Denies trauma . Exacerbated by: pressure  Treatments tried: Clindamycin q 6hrs since Friday  The treatment provided mild relief. Cardiovascular Review:  The patient has hyperlipidemia. Seen by Dr. Billie Lema records reviewed. Diet and Lifestyle: exercises regularly, nonsmoker  Home BP Monitoring: is not measured at home. Pertinent ROS: taking medications as instructed, no medication side effects noted, no TIA's, no chest pain on exertion, no dyspnea on exertion, no swelling of ankles. Review of Systems   Constitutional: Negative for diaphoresis, fever and weight loss. Eyes: Negative for blurred vision and pain. Respiratory: Negative for shortness of breath. Cardiovascular: Negative for chest pain, orthopnea and leg swelling. Musculoskeletal: Positive for gout. Neurological: Negative for focal weakness and headaches. Psychiatric/Behavioral: Negative for depression. Physical Exam   Constitutional: He is oriented to person, place, and time. No distress. Cardiovascular: Normal rate, regular rhythm and normal heart sounds. Pulmonary/Chest: Breath sounds normal. No respiratory distress. He has no wheezes. He has no rales. Musculoskeletal:        Left foot: There is tenderness and swelling. Feet:    Neurological: He is alert and oriented to person, place, and time. Psychiatric: He has a normal mood and affect. ASSESSMENT and PLAN  Diagnoses and all orders for this visit:    1. Acute idiopathic gout of left foot-  recurrent episode. >3 attacks in <1 year would benefit from ppx.  First treat acute attack. -     URIC ACID  -     predniSONE (STERAPRED DS) 10 mg dose pack; Take 2 Tabs by mouth See Admin Instructions. See administration instruction per 10mg dose pack    2. Mixed hyperlipidemia  -     LIPID PANEL  -     METABOLIC PANEL, COMPREHENSIVE    3. Foot swelling- xray w/o fracture. Prednisone dose pack ordered. -     XR FOOT LT MIN 3 V; Future    4. Erectile dysfunction due to diseases classified elsewhere- per Urology. Will give Cialis sample   -     CBC WITH AUTOMATED DIFF  -     TESTOSTERONE, FREE & TOTAL    5. Coronary artery disease involving native coronary artery of native heart without angina pectoris- per cardiology. On ASA       Follow-up Disposition:  1 week wound check     Medication risks/benefits/costs/interactions/alternatives discussed with patient. Carri Leal  was given an after visit summary which includes diagnoses, current medications, & vitals. he expressed understanding with the diagnosis and plan.

## 2018-02-02 DIAGNOSIS — N52.9 ERECTILE DYSFUNCTION, UNSPECIFIED ERECTILE DYSFUNCTION TYPE: Primary | ICD-10-CM

## 2018-02-02 RX ORDER — TADALAFIL 5 MG/1
10 TABLET ORAL AS NEEDED
Qty: 30 TAB | Refills: 0 | Status: SHIPPED | COMMUNITY
Start: 2018-02-02 | End: 2019-05-22

## 2018-02-02 NOTE — PROGRESS NOTES
Hi Mr. Erika Combs news there is no fracture on your xray. As discussed tonight via phone we will treat you for gout based on your symptoms. I have called in a steroid dose pack. Do not hesitate to contact the office if you have any questions or concerns before your next appointment.    Kind regards,   Dr. Shiv Bacon

## 2018-02-02 NOTE — PATIENT INSTRUCTIONS
Gout: Care Instructions  Your Care Instructions    Gout is a form of arthritis caused by a buildup of uric acid crystals in a joint. It causes sudden attacks of pain, swelling, redness, and stiffness, usually in one joint, especially the big toe. Gout usually comes on without a cause. But it can be brought on by drinking alcohol (especially beer) or eating seafood and red meat. Taking certain medicines, such as diuretics or aspirin, also can bring on an attack of gout. Taking your medicines as prescribed and following up with your doctor regularly can help you avoid gout attacks in the future. Follow-up care is a key part of your treatment and safety. Be sure to make and go to all appointments, and call your doctor if you are having problems. It's also a good idea to know your test results and keep a list of the medicines you take. How can you care for yourself at home? · If the joint is swollen, put ice or a cold pack on the area for 10 to 20 minutes at a time. Put a thin cloth between the ice and your skin. · Prop up the sore limb on a pillow when you ice it or anytime you sit or lie down during the next 3 days. Try to keep it above the level of your heart. This will help reduce swelling. · Rest sore joints. Avoid activities that put weight or strain on the joints for a few days. Take short rest breaks from your regular activities during the day. · Take your medicines exactly as prescribed. Call your doctor if you think you are having a problem with your medicine. · Take pain medicines exactly as directed. ¨ If the doctor gave you a prescription medicine for pain, take it as prescribed. ¨ If you are not taking a prescription pain medicine, ask your doctor if you can take an over-the-counter medicine. · Eat less seafood and red meat. · Check with your doctor before drinking alcohol. · Losing weight, if you are overweight, may help reduce attacks of gout. But do not go on a [employer]. \" Losing a lot of weight in a short amount of time can cause a gout attack. When should you call for help? Call your doctor now or seek immediate medical care if:  ? · You have a fever. ? · The joint is so painful you cannot use it. ? · You have sudden, unexplained swelling, redness, warmth, or severe pain in one or more joints. ? Watch closely for changes in your health, and be sure to contact your doctor if:  ? · You have joint pain. ? · Your symptoms get worse or are not improving after 2 or 3 days. Where can you learn more? Go to http://sami-alex.info/. Enter L540 in the search box to learn more about \"Gout: Care Instructions. \"  Current as of: October 31, 2016  Content Version: 11.4  © 2325-9752 [employer]. Care instructions adapted under license by [employer] (which disclaims liability or warranty for this information). If you have questions about a medical condition or this instruction, always ask your healthcare professional. [place] [address] any warranty or liability for your use of this information.

## 2018-02-04 LAB
ALBUMIN SERPL-MCNC: 4.1 G/DL (ref 3.5–5.5)
ALBUMIN/GLOB SERPL: 1.9 {RATIO} (ref 1.2–2.2)
ALP SERPL-CCNC: 40 IU/L (ref 39–117)
ALT SERPL-CCNC: 17 IU/L (ref 0–44)
AST SERPL-CCNC: 10 IU/L (ref 0–40)
BASOPHILS # BLD AUTO: 0 X10E3/UL (ref 0–0.2)
BASOPHILS NFR BLD AUTO: 0 %
BILIRUB SERPL-MCNC: 0.4 MG/DL (ref 0–1.2)
BUN SERPL-MCNC: 11 MG/DL (ref 6–24)
BUN/CREAT SERPL: 13 (ref 9–20)
CALCIUM SERPL-MCNC: 9.1 MG/DL (ref 8.7–10.2)
CHLORIDE SERPL-SCNC: 101 MMOL/L (ref 96–106)
CHOLEST SERPL-MCNC: 98 MG/DL (ref 100–199)
CO2 SERPL-SCNC: 25 MMOL/L (ref 18–29)
CREAT SERPL-MCNC: 0.85 MG/DL (ref 0.76–1.27)
EOSINOPHIL # BLD AUTO: 0.1 X10E3/UL (ref 0–0.4)
EOSINOPHIL NFR BLD AUTO: 1 %
ERYTHROCYTE [DISTWIDTH] IN BLOOD BY AUTOMATED COUNT: 13.8 % (ref 12.3–15.4)
GFR SERPLBLD CREATININE-BSD FMLA CKD-EPI: 112 ML/MIN/1.73
GFR SERPLBLD CREATININE-BSD FMLA CKD-EPI: 97 ML/MIN/1.73
GLOBULIN SER CALC-MCNC: 2.2 G/DL (ref 1.5–4.5)
GLUCOSE SERPL-MCNC: 102 MG/DL (ref 65–99)
HCT VFR BLD AUTO: 39.8 % (ref 37.5–51)
HDLC SERPL-MCNC: 35 MG/DL
HGB BLD-MCNC: 12.7 G/DL (ref 13–17.7)
IMM GRANULOCYTES # BLD: 0 X10E3/UL (ref 0–0.1)
IMM GRANULOCYTES NFR BLD: 0 %
LDLC SERPL CALC-MCNC: 29 MG/DL (ref 0–99)
LYMPHOCYTES # BLD AUTO: 2.5 X10E3/UL (ref 0.7–3.1)
LYMPHOCYTES NFR BLD AUTO: 32 %
MCH RBC QN AUTO: 31.6 PG (ref 26.6–33)
MCHC RBC AUTO-ENTMCNC: 31.9 G/DL (ref 31.5–35.7)
MCV RBC AUTO: 99 FL (ref 79–97)
MONOCYTES # BLD AUTO: 0.6 X10E3/UL (ref 0.1–0.9)
MONOCYTES NFR BLD AUTO: 8 %
NEUTROPHILS # BLD AUTO: 4.6 X10E3/UL (ref 1.4–7)
NEUTROPHILS NFR BLD AUTO: 59 %
PLATELET # BLD AUTO: 215 X10E3/UL (ref 150–379)
POTASSIUM SERPL-SCNC: 4 MMOL/L (ref 3.5–5.2)
PROT SERPL-MCNC: 6.3 G/DL (ref 6–8.5)
RBC # BLD AUTO: 4.02 X10E6/UL (ref 4.14–5.8)
SODIUM SERPL-SCNC: 142 MMOL/L (ref 134–144)
TESTOST FREE SERPL-MCNC: 19.8 PG/ML (ref 7.2–24)
TESTOST SERPL-MCNC: 615 NG/DL (ref 264–916)
TRIGL SERPL-MCNC: 171 MG/DL (ref 0–149)
URATE SERPL-MCNC: 7 MG/DL (ref 3.7–8.6)
VLDLC SERPL CALC-MCNC: 34 MG/DL (ref 5–40)
WBC # BLD AUTO: 7.9 X10E3/UL (ref 3.4–10.8)

## 2018-02-12 ENCOUNTER — DOCUMENTATION ONLY (OUTPATIENT)
Dept: INTERNAL MEDICINE CLINIC | Age: 58
End: 2018-02-12

## 2018-02-13 ENCOUNTER — OFFICE VISIT (OUTPATIENT)
Dept: INTERNAL MEDICINE CLINIC | Age: 58
End: 2018-02-13

## 2018-02-13 VITALS
SYSTOLIC BLOOD PRESSURE: 134 MMHG | WEIGHT: 208.2 LBS | HEIGHT: 68 IN | OXYGEN SATURATION: 98 % | HEART RATE: 78 BPM | TEMPERATURE: 97.5 F | RESPIRATION RATE: 18 BRPM | BODY MASS INDEX: 31.55 KG/M2 | DIASTOLIC BLOOD PRESSURE: 84 MMHG

## 2018-02-13 DIAGNOSIS — M1A.0720 CHRONIC IDIOPATHIC GOUT INVOLVING TOE OF LEFT FOOT WITHOUT TOPHUS: ICD-10-CM

## 2018-02-13 DIAGNOSIS — R79.89 LOW TESTOSTERONE: ICD-10-CM

## 2018-02-13 DIAGNOSIS — E78.2 MIXED HYPERLIPIDEMIA: Primary | ICD-10-CM

## 2018-02-13 DIAGNOSIS — D75.89 MACROCYTOSIS: ICD-10-CM

## 2018-02-13 RX ORDER — ALLOPURINOL 100 MG/1
100 TABLET ORAL DAILY
Qty: 60 TAB | Refills: 3 | Status: SHIPPED | OUTPATIENT
Start: 2018-02-13 | End: 2018-03-07 | Stop reason: SDUPTHER

## 2018-02-13 RX ORDER — COLCHICINE 0.6 MG/1
0.6 CAPSULE ORAL DAILY
Qty: 30 CAP | Refills: 1 | Status: SHIPPED | OUTPATIENT
Start: 2018-02-13 | End: 2018-03-05 | Stop reason: SDUPTHER

## 2018-02-13 NOTE — PATIENT INSTRUCTIONS
It was a pleasure to see you! As discussed:    Lab Review   Your labs were clinically normal/ stable. No med changes other than the addition of allopurinol/ colchicine are needed. Take colchicine for the first month you are on allopurinol. Some labs that may have been tested and their explanation are:  Your electrolytes, kidney & liver function (Metabolic Panel)   Anemia, blood cells (CBC)  Thyroid (TSH + T4, T3)  Hormones (prolactin, vitamin D )     Diabetes (Hemoglobin A1c)  PSA (Prostate Health)  Lipid Panel (Cholesterol, HDL \"good\", LDL \"bad\")            Purine-Restricted Diet: Care Instructions  Your Care Instructions    Purines are substances that are found in some foods. Your body turns purines into uric acid. High levels of uric acid can cause gout, which is a form of arthritis that causes pain and inflammation in joints. You may be able to help control the amount of uric acid in your body by limiting high-purine foods in your diet. Follow-up care is a key part of your treatment and safety. Be sure to make and go to all appointments, and call your doctor if you are having problems. It's also a good idea to know your test results and keep a list of the medicines you take. How can you care for yourself at home? · Plan your meals and snacks around foods that are low in purines and are safe for you to eat. These foods include:  ¨ Green vegetables and tomatoes. ¨ Fruits. ¨ Whole-grain breads, rice, and cereals. ¨ Eggs, peanut butter, and nuts. ¨ Low-fat milk, cheese, and other milk products. ¨ Popcorn. ¨ Gelatin desserts, chocolate, cocoa, and cakes and sweets, in small amounts. · You can eat certain foods that are medium-high in purines, but eat them only once in a while. These foods include:  ¨ Legumes, such as dried beans and dried peas. You can have 1 cup cooked legumes each day. ¨ Asparagus, cauliflower, spinach, mushrooms, and green peas.   ¨ Fish and seafood (other than very high-purine seafood). ¨ Oatmeal, wheat bran, and wheat germ. · Limit very high-purine foods, including:  ¨ Organ meats, such as liver, kidneys, sweetbreads, and brains. ¨ Meats, including seth, beef, pork, and lamb. ¨ Game meats and any other meats in large amounts. ¨ Anchovies, sardines, herring, mackerel, and scallops. ¨ Gravy. ¨ Beer. Where can you learn more? Go to http://sami-alex.info/. Enter F448 in the search box to learn more about \"Purine-Restricted Diet: Care Instructions. \"  Current as of: May 12, 2017  Content Version: 11.4  © 1016-3602 [employer], [employer]. Care instructions adapted under license by [employer] (which disclaims liability or warranty for this information). If you have questions about a medical condition or this instruction, always ask your healthcare professional. [place] [address] any warranty or liability for your use of this information.

## 2018-02-13 NOTE — PROGRESS NOTES
Chief Complaint   Patient presents with    Gout     1. Have you been to the ER, urgent care clinic since your last visit? Hospitalized since your last visit? No    2. Have you seen or consulted any other health care providers outside of the [address] [place] [address] since your last visit? Include any pap smears or colon screening.  No

## 2018-02-13 NOTE — PROGRESS NOTES
HISTORY OF PRESENT ILLNESS  Saurav Robert is a 62 y.o. male. HPI   Gout  Presents for 1 week f/u. Completed prednisone. Sx resolved. Cardiovascular Review  The patient has CAD, hyperlipidemia and obesity. Diet and Lifestyle: generally follows a low fat low cholesterol diet, generally follows a low sodium diet, nonsmoker  Home BP Monitoring: is not measured at home. Pertinent ROS: taking medications as instructed, no medication side effects noted, no TIA's, no chest pain on exertion, no dyspnea on exertion, no swelling of ankles. Review of Systems   Constitutional: Negative for diaphoresis, fever and weight loss. Eyes: Negative for blurred vision and pain. Respiratory: Negative for shortness of breath. Cardiovascular: Negative for chest pain, orthopnea and leg swelling. Neurological: Negative for focal weakness and headaches. Psychiatric/Behavioral: Negative for depression. Patient Active Problem List    Diagnosis Date Noted    Coronary artery disease involving native coronary artery of native heart without angina pectoris 02/01/2018    Low testosterone 08/09/2017    NEETU (obstructive sleep apnea) 03/29/2016    Anxiety 12/16/2014    HLD (hyperlipidemia) 12/16/2014    GERD (gastroesophageal reflux disease) 12/16/2014    PND (post-nasal drip) 12/16/2014       Current Outpatient Prescriptions   Medication Sig Dispense Refill    tadalafil (CIALIS) 5 mg tablet Take 2 Tabs by mouth as needed. 30 minutes before activity 30 Tab 0    clindamycin (CLEOCIN) 150 mg capsule Take  by mouth every six (6) hours.  venlafaxine-SR (EFFEXOR-XR) 75 mg capsule TAKE 1 CAPSULES BY MOUTH EVERY DAY 90 Cap 1    atorvastatin (LIPITOR) 10 mg tablet Take 1 Tab by mouth daily. 90 Tab 1    omeprazole (PRILOSEC) 20 mg capsule Take 20 mg by mouth daily.  aspirin delayed-release 81 mg tablet Take  by mouth daily.       testosterone cypionate (DEPOTESTOTERONE CYPIONATE) 200 mg/mL injection by IntraMUSCular route every fourteen (14) days. 0 Vial 0    sildenafil citrate (VIAGRA) 100 mg tablet Take 1 Tab by mouth as needed. 8 Tab 2    cetirizine (ZYRTEC) 10 mg tablet Take 10 mg by mouth daily.  predniSONE (STERAPRED DS) 10 mg dose pack Take 2 Tabs by mouth See Admin Instructions. See administration instruction per 10mg dose pack 21 Tab 0       Allergies   Allergen Reactions    Morphine Itching    Pcn [Penicillins] Itching      Visit Vitals    /84 (BP 1 Location: Right arm, BP Patient Position: Sitting)    Pulse 78    Temp 97.5 °F (36.4 °C) (Oral)    Resp 18    Ht 5' 8\" (1.727 m)    Wt 208 lb 3.2 oz (94.4 kg)    SpO2 98%    BMI 31.66 kg/m2       Physical Exam   Constitutional: He is oriented to person, place, and time. No distress. Pulmonary/Chest: No respiratory distress. He has no wheezes. He has no rales. He exhibits no tenderness. Coughing on expiration    Musculoskeletal:        Feet:    Neurological: He is alert and oriented to person, place, and time. Psychiatric: He has a normal mood and affect. Lab Results  Component Value Date/Time   WBC 7.9 02/02/2018 10:45 AM   HGB 12.7 (L) 02/02/2018 10:45 AM   HCT 39.8 02/02/2018 10:45 AM   PLATELET 269 36/11/8988 10:45 AM   MCV 99 (H) 02/02/2018 10:45 AM     Lab Results  Component Value Date/Time   Hemoglobin A1c 6.1 (H) 08/30/2017 08:24 AM   Hemoglobin A1c 6.2 (H) 03/16/2016 07:50 AM   Hemoglobin A1c 6.2 (H) 01/27/2015 10:06 AM   Glucose 102 (H) 02/02/2018 10:45 AM   LDL, calculated 29 02/02/2018 10:45 AM   Creatinine 0.85 02/02/2018 10:45 AM      Lab Results  Component Value Date/Time   Cholesterol, total 98 (L) 02/02/2018 10:45 AM   HDL Cholesterol 35 (L) 02/02/2018 10:45 AM   LDL, calculated 29 02/02/2018 10:45 AM   Triglyceride 171 (H) 02/02/2018 10:45 AM     Lab Results  Component Value Date/Time   ALT (SGPT) 17 02/02/2018 10:45 AM   AST (SGOT) 10 02/02/2018 10:45 AM   Alk.  phosphatase 40 02/02/2018 10:45 AM   Bilirubin, total 0.4 02/02/2018 10:45 AM   Albumin 4.1 02/02/2018 10:45 AM   Protein, total 6.3 02/02/2018 10:45 AM   PLATELET 801 34/05/8221 10:45 AM       Lab Results  Component Value Date/Time   GFR est non-AA 97 02/02/2018 10:45 AM   GFR est  02/02/2018 10:45 AM   Creatinine 0.85 02/02/2018 10:45 AM   BUN 11 02/02/2018 10:45 AM   Sodium 142 02/02/2018 10:45 AM   Potassium 4.0 02/02/2018 10:45 AM   Chloride 101 02/02/2018 10:45 AM   CO2 25 02/02/2018 10:45 AM     Lab Results  Component Value Date/Time   TSH 2.080 03/16/2016 07:50 AM   TSH 1.680 01/27/2015 10:06 AM   T4, Free 1.31 01/27/2015 10:06 AM      Lab Results   Component Value Date/Time    Glucose 102 (H) 02/02/2018 10:45 AM         ASSESSMENT and PLAN  Diagnoses and all orders for this visit:    1. Mixed hyperlipidemia-triglycerides improving. LDL is low. He is followed closely per cardiology will defer Statin management to Dr. Kartik Beasley. 2. Chronic idiopathic gout involving toe of left foot without tophus- recovered from acute flare. Urate level is 7 suspect this is possibly decreased due to this recent flare. He would benefit from gout prophylaxis given his recurrent flares. Allopurinol with colchicine bridge for acute symptoms has been ordered. Will monitor for side effects counseled on potential risk. Check labs prior to next appointment  -     allopurinol (ZYLOPRIM) 100 mg tablet; Take 1 Tab by mouth daily. Week 1, week 2 and beyond take 2 tabs by mouth daily  -     colchicine (MITIGARE) 0.6 mg capsule; Take 1 Cap by mouth daily. (If signs of gout flare,take 2 tabs at  once then in one hour take 1 tab by mouth)    3. Low testosterone-testosterone level in therapeutic range. He is followed by urology has follow-up appointment with Dr. Wilfredo Cormier tomorrow. Follow-up Disposition:  Return in about 3 months (around 5/13/2018) for Follow-up GOUT . Medication risks/benefits/costs/interactions/alternatives discussed with patient.   Leda Ramachandran  was given an after visit summary which includes diagnoses, current medications, & vitals. he expressed understanding with the diagnosis and plan.

## 2018-02-13 NOTE — LETTER
2/13/2018 10:15 AM 
 
Mr. Carri Leal [address] Dear Carri Leal: 
 
Please find your most recent results below. Resulted Orders URIC ACID Result Value Ref Range Uric acid 7.0 3.7 - 8.6 mg/dL Comment:  
              Therapeutic target for gout patients: <6.0 Narrative Performed at:  [address]  [id number] : Jmi Ferrell MD, Phone:  [phone] LIPID PANEL Result Value Ref Range Cholesterol, total 98 (L) 100 - 199 mg/dL Triglyceride 171 (H) 0 - 149 mg/dL HDL Cholesterol 35 (L) >39 mg/dL VLDL, calculated 34 5 - 40 mg/dL LDL, calculated 29 0 - 99 mg/dL Narrative Performed at:  [address]  [id number] : Jim Ferrell MD, Phone:  [phone] CBC WITH AUTOMATED DIFF Result Value Ref Range WBC 7.9 3.4 - 10.8 x10E3/uL  
 RBC 4.02 (L) 4.14 - 5.80 x10E6/uL HGB 12.7 (L) 13.0 - 17.7 g/dL HCT 39.8 37.5 - 51.0 % MCV 99 (H) 79 - 97 fL  
 MCH 31.6 26.6 - 33.0 pg  
 MCHC 31.9 31.5 - 35.7 g/dL  
 RDW 13.8 12.3 - 15.4 % PLATELET 797 667 - 441 x10E3/uL NEUTROPHILS 59 Not Estab. % Lymphocytes 32 Not Estab. % MONOCYTES 8 Not Estab. % EOSINOPHILS 1 Not Estab. % BASOPHILS 0 Not Estab. %  
 ABS. NEUTROPHILS 4.6 1.4 - 7.0 x10E3/uL Abs Lymphocytes 2.5 0.7 - 3.1 x10E3/uL  
 ABS. MONOCYTES 0.6 0.1 - 0.9 x10E3/uL  
 ABS. EOSINOPHILS 0.1 0.0 - 0.4 x10E3/uL  
 ABS. BASOPHILS 0.0 0.0 - 0.2 x10E3/uL IMMATURE GRANULOCYTES 0 Not Estab. %  
 ABS. IMM. GRANS. 0.0 0.0 - 0.1 x10E3/uL Narrative Performed at:  [address]  [id number] : Jim Ferrell MD, Phone:  [phone] METABOLIC PANEL, COMPREHENSIVE Result Value Ref Range Glucose 102 (H) 65 - 99 mg/dL BUN 11 6 - 24 mg/dL Creatinine 0.85 0.76 - 1.27 mg/dL GFR est non-AA 97 >59 mL/min/1.73 GFR est  >59 mL/min/1.73  
 BUN/Creatinine ratio 13 9 - 20 Sodium 142 134 - 144 mmol/L Potassium 4.0 3.5 - 5.2 mmol/L Chloride 101 96 - 106 mmol/L  
 CO2 25 18 - 29 mmol/L Calcium 9.1 8.7 - 10.2 mg/dL Protein, total 6.3 6.0 - 8.5 g/dL Albumin 4.1 3.5 - 5.5 g/dL GLOBULIN, TOTAL 2.2 1.5 - 4.5 g/dL A-G Ratio 1.9 1.2 - 2.2 Bilirubin, total 0.4 0.0 - 1.2 mg/dL Alk. phosphatase 40 39 - 117 IU/L  
 AST (SGOT) 10 0 - 40 IU/L  
 ALT (SGPT) 17 0 - 44 IU/L Narrative Performed at:  [id number] [address]  [id number] : Brittany Chakraborty MD, Phone:  [phone] TESTOSTERONE, FREE & TOTAL Result Value Ref Range Testosterone 615 264 - 916 ng/dL Comment:  
   Adult male reference interval is based on a population of 
healthy nonobese males (BMI <30) between 23and 44years old. [address] [place] [address], [address] [place] [address] [phone]. PMID: 03290455. Free testosterone (Direct) 19.8 7.2 - 24.0 pg/mL Narrative Performed at:  [id number] [address]  [id number] : Brittany Chakraborty MD, Phone:  [phone] RECOMMENDATIONS: 
We are starting allopurinol for your gout. Please call me if you have any questions: [phone] Sincerely, Lianne Beavers MD

## 2018-03-05 ENCOUNTER — OFFICE VISIT (OUTPATIENT)
Dept: INTERNAL MEDICINE CLINIC | Age: 58
End: 2018-03-05

## 2018-03-05 VITALS
OXYGEN SATURATION: 97 % | WEIGHT: 206 LBS | HEIGHT: 68 IN | RESPIRATION RATE: 16 BRPM | TEMPERATURE: 98.1 F | HEART RATE: 80 BPM | BODY MASS INDEX: 31.22 KG/M2 | SYSTOLIC BLOOD PRESSURE: 128 MMHG | DIASTOLIC BLOOD PRESSURE: 90 MMHG

## 2018-03-05 DIAGNOSIS — T14.8XXA ABRASION: ICD-10-CM

## 2018-03-05 DIAGNOSIS — M1A.0720 CHRONIC IDIOPATHIC GOUT INVOLVING TOE OF LEFT FOOT WITHOUT TOPHUS: ICD-10-CM

## 2018-03-05 DIAGNOSIS — M10.071 ACUTE IDIOPATHIC GOUT OF RIGHT FOOT: Primary | ICD-10-CM

## 2018-03-05 RX ORDER — COLCHICINE 0.6 MG/1
0.6 CAPSULE ORAL DAILY
Qty: 30 CAP | Refills: 1 | Status: SHIPPED | OUTPATIENT
Start: 2018-03-05 | End: 2018-03-07 | Stop reason: SDUPTHER

## 2018-03-05 RX ORDER — METHYLPREDNISOLONE 4 MG/1
TABLET ORAL
Qty: 1 DOSE PACK | Refills: 0 | Status: SHIPPED | OUTPATIENT
Start: 2018-03-05 | End: 2019-05-22

## 2018-03-05 NOTE — PROGRESS NOTES
HISTORY OF PRESENT ILLNESS  Regi Calderón is a 62 y.o. male. Gout   This is a recurrent problem. The current episode started 2 days ago (after eating steak and having 4 drinks. ). The problem occurs constantly. The problem has been gradually worsening. Associated symptoms comments: Left foot- 1st toe    Right foot- heel and 1st toe . The symptoms are aggravated by walking. Nothing relieves the symptoms. He has tried nothing for the symptoms. Review of Systems   Musculoskeletal: Positive for gout. Patient Active Problem List    Diagnosis Date Noted    Coronary artery disease involving native coronary artery of native heart without angina pectoris 02/01/2018    Low testosterone 08/09/2017    NEETU (obstructive sleep apnea) 03/29/2016    Anxiety 12/16/2014    HLD (hyperlipidemia) 12/16/2014    GERD (gastroesophageal reflux disease) 12/16/2014    PND (post-nasal drip) 12/16/2014       Current Outpatient Prescriptions   Medication Sig Dispense Refill    allopurinol (ZYLOPRIM) 100 mg tablet Take 1 Tab by mouth daily. Week 1, week 2 and beyond take 2 tabs by mouth daily 60 Tab 3    tadalafil (CIALIS) 5 mg tablet Take 2 Tabs by mouth as needed. 30 minutes before activity 30 Tab 0    clindamycin (CLEOCIN) 150 mg capsule Take  by mouth every six (6) hours.  venlafaxine-SR (EFFEXOR-XR) 75 mg capsule TAKE 1 CAPSULES BY MOUTH EVERY DAY 90 Cap 1    atorvastatin (LIPITOR) 10 mg tablet Take 1 Tab by mouth daily. 90 Tab 1    omeprazole (PRILOSEC) 20 mg capsule Take 20 mg by mouth daily.  aspirin delayed-release 81 mg tablet Take  by mouth daily.  testosterone cypionate (DEPOTESTOTERONE CYPIONATE) 200 mg/mL injection by IntraMUSCular route every fourteen (14) days. 0 Vial 0    sildenafil citrate (VIAGRA) 100 mg tablet Take 1 Tab by mouth as needed. 8 Tab 2    cetirizine (ZYRTEC) 10 mg tablet Take 10 mg by mouth daily.  colchicine (MITIGARE) 0.6 mg capsule Take 1 Cap by mouth daily.  (If signs of gout flare,take 2 tabs at  once then in one hour take 1 tab by mouth) 30 Cap 1    predniSONE (STERAPRED DS) 10 mg dose pack Take 2 Tabs by mouth See Admin Instructions. See administration instruction per 10mg dose pack 21 Tab 0       Allergies   Allergen Reactions    Morphine Itching    Pcn [Penicillins] Itching      Visit Vitals    /90 (BP 1 Location: Right arm, BP Patient Position: Sitting)    Pulse 80    Temp 98.1 °F (36.7 °C) (Oral)    Resp 16    Ht 5' 8\" (1.727 m)    Wt 206 lb (93.4 kg)    SpO2 97%    BMI 31.32 kg/m2       Physical Exam   Constitutional: He is oriented to person, place, and time. No distress. Cardiovascular: Normal rate and regular rhythm. Pulmonary/Chest: Breath sounds normal. No respiratory distress. He has no wheezes. He has no rales. Musculoskeletal:        Right foot: There is decreased range of motion, tenderness and bony tenderness. Left foot: There is decreased range of motion and bony tenderness. Feet:    Neurological: He is alert and oriented to person, place, and time. ASSESSMENT and PLAN  Diagnoses and all orders for this visit:    1 . Chronic idiopathic gout involving toe of left foot without tophus- recurrent attack due to dietary indiscretion. Would benefit from from steroid pack given acuity. -     colchicine (MITIGARE) 0.6 mg capsule; Take 1 Cap by mouth daily. (If signs of gout flare,take 2 tabs at  once then in one hour take 1 tab by mouth)  -     methylPREDNISolone (MEDROL DOSEPACK) 4 mg tablet; Take as directed    2. Heel Abrasion- no e/o infection. Advised to change foot wear   Follow-up Disposition:  Return if symptoms worsen or fail to improve. Medication risks/benefits/costs/interactions/alternatives discussed with patient. Corazon Kwok  was given an after visit summary which includes diagnoses, current medications, & vitals. he expressed understanding with the diagnosis and plan.

## 2018-03-05 NOTE — PROGRESS NOTES
Chief Complaint   Patient presents with    Gout     1. Have you been to the ER, urgent care clinic since your last visit? Hospitalized since your last visit? No    2. Have you seen or consulted any other health care providers outside of the [address] [place] [address] since your last visit? Include any pap smears or colon screening.  No

## 2018-03-05 NOTE — PATIENT INSTRUCTIONS
Purine-Restricted Diet: Care Instructions  Your Care Instructions    Purines are substances that are found in some foods. Your body turns purines into uric acid. High levels of uric acid can cause gout, which is a form of arthritis that causes pain and inflammation in joints. You may be able to help control the amount of uric acid in your body by limiting high-purine foods in your diet. Follow-up care is a key part of your treatment and safety. Be sure to make and go to all appointments, and call your doctor if you are having problems. It's also a good idea to know your test results and keep a list of the medicines you take. How can you care for yourself at home? · Plan your meals and snacks around foods that are low in purines and are safe for you to eat. These foods include:  ¨ Green vegetables and tomatoes. ¨ Fruits. ¨ Whole-grain breads, rice, and cereals. ¨ Eggs, peanut butter, and nuts. ¨ Low-fat milk, cheese, and other milk products. ¨ Popcorn. ¨ Gelatin desserts, chocolate, cocoa, and cakes and sweets, in small amounts. · You can eat certain foods that are medium-high in purines, but eat them only once in a while. These foods include:  ¨ Legumes, such as dried beans and dried peas. You can have 1 cup cooked legumes each day. ¨ Asparagus, cauliflower, spinach, mushrooms, and green peas. ¨ Fish and seafood (other than very high-purine seafood). ¨ Oatmeal, wheat bran, and wheat germ. · Limit very high-purine foods, including:  ¨ Organ meats, such as liver, kidneys, sweetbreads, and brains. ¨ Meats, including seth, beef, pork, and lamb. ¨ Game meats and any other meats in large amounts. ¨ Anchovies, sardines, herring, mackerel, and scallops. ¨ Gravy. ¨ Beer. Where can you learn more? Go to http://sami-alex.info/. Enter F448 in the search box to learn more about \"Purine-Restricted Diet: Care Instructions. \"  Current as of:  May 12, 2017  Content Version: 11.4  © 0019-5228 [employer]. Care instructions adapted under license by [employer] (which disclaims liability or warranty for this information). If you have questions about a medical condition or this instruction, always ask your healthcare professional. [place] [address] any warranty or liability for your use of this information.

## 2018-03-07 DIAGNOSIS — M1A.0720 CHRONIC IDIOPATHIC GOUT INVOLVING TOE OF LEFT FOOT WITHOUT TOPHUS: ICD-10-CM

## 2018-03-08 RX ORDER — ALLOPURINOL 100 MG/1
100 TABLET ORAL DAILY
Qty: 180 TAB | Refills: 2 | Status: SHIPPED | OUTPATIENT
Start: 2018-03-08 | End: 2018-12-18 | Stop reason: SDUPTHER

## 2018-03-08 RX ORDER — COLCHICINE 0.6 MG/1
0.6 CAPSULE ORAL DAILY
Qty: 180 CAP | Refills: 0 | Status: SHIPPED | OUTPATIENT
Start: 2018-03-08 | End: 2020-05-22 | Stop reason: SDUPTHER

## 2018-05-13 RX ORDER — VENLAFAXINE HYDROCHLORIDE 75 MG/1
CAPSULE, EXTENDED RELEASE ORAL
Qty: 90 CAP | Refills: 2 | Status: SHIPPED | OUTPATIENT
Start: 2018-05-13 | End: 2019-01-18 | Stop reason: SDUPTHER

## 2018-12-18 DIAGNOSIS — M1A.0720 CHRONIC IDIOPATHIC GOUT INVOLVING TOE OF LEFT FOOT WITHOUT TOPHUS: ICD-10-CM

## 2018-12-18 RX ORDER — ALLOPURINOL 100 MG/1
TABLET ORAL
Qty: 180 TAB | Refills: 2 | Status: SHIPPED | OUTPATIENT
Start: 2018-12-18 | End: 2019-06-05 | Stop reason: SDUPTHER

## 2018-12-31 DIAGNOSIS — E78.1 PURE HYPERGLYCERIDEMIA: ICD-10-CM

## 2019-01-02 RX ORDER — ATORVASTATIN CALCIUM 10 MG/1
TABLET, FILM COATED ORAL
Qty: 90 TAB | Refills: 2 | Status: SHIPPED | OUTPATIENT
Start: 2019-01-02 | End: 2019-06-07 | Stop reason: SDUPTHER

## 2019-01-16 DIAGNOSIS — R79.89 LOW TESTOSTERONE: Primary | ICD-10-CM

## 2019-01-16 RX ORDER — SYRINGE W-NEEDLE,DISPOSAB,3 ML 25GX5/8"
SYRINGE, EMPTY DISPOSABLE MISCELLANEOUS
Qty: 2 SYRINGE | Refills: 0 | Status: SHIPPED | OUTPATIENT
Start: 2019-01-16

## 2019-01-18 DIAGNOSIS — F41.9 ANXIETY: Primary | ICD-10-CM

## 2019-01-21 RX ORDER — VENLAFAXINE HYDROCHLORIDE 75 MG/1
CAPSULE, EXTENDED RELEASE ORAL
Qty: 90 CAP | Refills: 1 | Status: SHIPPED | OUTPATIENT
Start: 2019-01-21 | End: 2019-06-07 | Stop reason: SDUPTHER

## 2019-05-22 ENCOUNTER — OFFICE VISIT (OUTPATIENT)
Dept: INTERNAL MEDICINE CLINIC | Age: 59
End: 2019-05-22

## 2019-05-22 VITALS
SYSTOLIC BLOOD PRESSURE: 140 MMHG | HEART RATE: 73 BPM | BODY MASS INDEX: 29.49 KG/M2 | OXYGEN SATURATION: 97 % | WEIGHT: 206 LBS | DIASTOLIC BLOOD PRESSURE: 94 MMHG | TEMPERATURE: 98.4 F | HEIGHT: 70 IN | RESPIRATION RATE: 16 BRPM

## 2019-05-22 DIAGNOSIS — E78.1 PURE HYPERGLYCERIDEMIA: ICD-10-CM

## 2019-05-22 DIAGNOSIS — M10.472 GOUT DUE TO OTHER SECONDARY CAUSE INVOLVING TOE OF LEFT FOOT, UNSPECIFIED CHRONICITY: ICD-10-CM

## 2019-05-22 DIAGNOSIS — R79.89 LOW TESTOSTERONE: ICD-10-CM

## 2019-05-22 DIAGNOSIS — Z12.5 PROSTATE CANCER SCREENING: ICD-10-CM

## 2019-05-22 DIAGNOSIS — F41.9 ANXIETY: ICD-10-CM

## 2019-05-22 DIAGNOSIS — Z00.00 ROUTINE GENERAL MEDICAL EXAMINATION AT A HEALTH CARE FACILITY: Primary | ICD-10-CM

## 2019-05-22 DIAGNOSIS — Z12.11 COLON CANCER SCREENING: ICD-10-CM

## 2019-05-22 NOTE — PROGRESS NOTES
HISTORY OF PRESENT ILLNESS  Maria C Roman is a 62 y.o. male for CPE   HPI  Health Maintenance   Topic Date Due    Shingrix Vaccine Age 49> (1 of 2) 09/08/2010    Influenza Age 5 to Adult  08/01/2019    COLONOSCOPY  09/26/2023    DTaP/Tdap/Td series (2 - Td) 04/06/2025    Hepatitis C Screening  Completed    Pneumococcal 0-64 years  Aged Out     Cardiovascular Review:  The patient has hypertension, coronary artery disease and Body mass index is 29.77 kg/m². working on weight loss via exercise working with . Diet and Lifestyle: nonsmoker, alcohol intake <14 drinks/ week, reduced carbohydrate diet  Home BP Monitoring: is not measured at home. Pertinent ROS: no TIA's, no chest pain on exertion, no dyspnea on exertion, no swelling of ankles, taking medications as instructed\",no medication side effects noted. Stopped asa. Review of Systems   Musculoskeletal: Negative for joint pain (gout ).   per HPI  Patient Active Problem List    Diagnosis Date Noted    Coronary artery disease involving native coronary artery of native heart without angina pectoris 02/01/2018    Low testosterone 08/09/2017    NEETU (obstructive sleep apnea) 03/29/2016    Anxiety 12/16/2014    HLD (hyperlipidemia) 12/16/2014    GERD (gastroesophageal reflux disease) 12/16/2014    PND (post-nasal drip) 12/16/2014       Current Outpatient Medications   Medication Sig Dispense Refill    venlafaxine-SR (EFFEXOR-XR) 75 mg capsule TAKE 1 CAPSULES BY MOUTH  EVERY DAY 90 Cap 1    Syringe with Needle, Disp, 3 mL 22 x 1 1/2\" syrg Use to inject testosterone 2 Syringe 0    Needle, Disp, 18 G 18 gauge x 1 1/2\" ndle Use to inject testosterone 2 Each 0    atorvastatin (LIPITOR) 10 mg tablet TAKE 1 TABLET BY MOUTH  DAILY 90 Tab 2    allopurinol (ZYLOPRIM) 100 mg tablet Take 2 tablets by mouth daily (Patient taking differently: Take 100 mg by mouth daily.  Take 2 tablets by mouth daily) 180 Tab 2    omeprazole (PRILOSEC) 20 mg capsule Take 20 mg by mouth daily.  testosterone cypionate (DEPOTESTOTERONE CYPIONATE) 200 mg/mL injection by IntraMUSCular route every fourteen (14) days. 0 Vial 0    cetirizine (ZYRTEC) 10 mg tablet Take 10 mg by mouth daily.  colchicine (MITIGARE) 0.6 mg capsule Take 1 Cap by mouth daily. (If signs of gout flare,take 2 tabs at  once then in one hour take 1 tab by mouth) 180 Cap 0    methylPREDNISolone (MEDROL DOSEPACK) 4 mg tablet Take as directed 1 Dose Pack 0    tadalafil (CIALIS) 5 mg tablet Take 2 Tabs by mouth as needed. 30 minutes before activity 30 Tab 0    aspirin delayed-release 81 mg tablet Take  by mouth daily.  sildenafil citrate (VIAGRA) 100 mg tablet Take 1 Tab by mouth as needed. 8 Tab 2       Allergies   Allergen Reactions    Morphine Itching    Pcn [Penicillins] Itching      Visit Vitals  BP (!) 140/94 (BP 1 Location: Right arm)   Pulse 73   Temp 98.4 °F (36.9 °C) (Oral)   Resp 16   Ht 5' 9.75\" (1.772 m)   Wt 206 lb (93.4 kg)   SpO2 97%   BMI 29.77 kg/m²       Physical Exam   Constitutional: He is oriented to person, place, and time. He appears well-developed and well-nourished. No distress. HENT:   Head: Normocephalic and atraumatic. Right Ear: External ear normal.   Left Ear: External ear normal.   Nose: Nose normal.   Mouth/Throat: Oropharynx is clear and moist. No oropharyngeal exudate. Eyes: Conjunctivae are normal.   Neck: No JVD present. No thyromegaly present. Cardiovascular: Normal rate, regular rhythm and normal heart sounds. Exam reveals no gallop and no friction rub. No murmur heard. Pulmonary/Chest: Effort normal and breath sounds normal. No respiratory distress. He has no wheezes. He has no rales. Abdominal: Soft. Bowel sounds are normal. He exhibits no distension. There is no tenderness. There is no rebound. Genitourinary:   Genitourinary Comments: Per Urology     Musculoskeletal: Normal range of motion. He exhibits no edema.    Neurological: He is alert and oriented to person, place, and time. No cranial nerve deficit. Skin: Skin is warm and dry. No erythema. Psychiatric: He has a normal mood and affect. His behavior is normal.     Final [99] 3/27/2017 15:18 3/27/2017 15:34   Result Information     Status: Final result (Exam End: 3/27/2017 15:34) Provider Status: Reviewed   Study Result     INDICATION: Preventative healthcare.     Technique: Unenhanced multislice helical CT was performed for Ca scoring.      CT dose reduction was achieved for use of a standardized protocol tailored for  this examination and automatic exposure control for dose modulation.     Ca scoring:     Left main: 0  Left anterior descendin  Left circumflex: 0  Right coronary: 163  Posterior descendin  A  0  B  0  C  0  Total calcium score: 332     Your score of 332 places you in the 80th percentile rank. That means that 20% of  the males at the ages from 61-59 have a higher calcium scoring then you.     Chest CT findings: The visualized lungs are clear with no mass, nodule, airspace  disease or edema noted. It should be noted that the entire lung fields were not  imaged on this examination. No mediastinal mass is identified. The visualized  upper abdominal structures are normal on noncontrast images.     IMPRESSION  IMPRESSION: Total calcium score of 332. Moderate evidence of plaque. Moderate  nonobstructive coronary artery disease is highly likely. At high risk of future  cardiac events. The result should be discussed with your physician taking into  account other risk factors such as age, gender, family history, diabetes,  smoking or high cholesterol levels.          ASSESSMENT and PLAN  Diagnoses and all orders for this visit:    1.  Routine general medical examination at a health care facility- [facility] reviewed and addressed   -     LIPID PANEL  -     METABOLIC PANEL, COMPREHENSIVE  -     TSH 3RD GENERATION  -     CBC WITH AUTOMATED DIFF  -     T4, FREE  - URIC ACID  -     HEMOGLOBIN A1C WITH EAG    2. Pure hyperglyceridemia- check lipids. Advised to resume his ASA 81mg given his high coronary ca score. -     LIPID PANEL    3. Low testosterone- management per Urology    4. Anxiety- well controlled. Continue current regimen. Patient Education:  Reviewed concept of anxiety as biochemical imbalance of neurotransmitters and rationale for treatment. Instructed patient to contact office or 911 promptly should condition worsen or any new symptoms appear and provided on-call telephone numbers. IF THE PATIENT HAS ANY SUICIDAL OR HOMICIDAL IDEATION, CALL THE OFFICE, DISCUSS WITH A SUPPORT MEMBER OR GO TO THE ER IMMEDIATELY. Patient was agreeable with this      5. Colon cancer screening    6. Prostate cancer screening    7. Gout due to other secondary cause involving toe of left foot, unspecified chronicity - no recent flares. Check CBC and Uric acid. -     URIC ACID          Medication risks/benefits/costs/interactions/alternatives discussed with patient. Luann Ayana  was given an after visit summary which includes diagnoses, current medications, & vitals. he expressed understanding with the diagnosis and plan.

## 2019-06-04 ENCOUNTER — PATIENT MESSAGE (OUTPATIENT)
Dept: INTERNAL MEDICINE CLINIC | Age: 59
End: 2019-06-04

## 2019-06-04 LAB
ALBUMIN SERPL-MCNC: 4.8 G/DL (ref 3.5–5.5)
ALBUMIN/GLOB SERPL: 2.2 {RATIO} (ref 1.2–2.2)
ALP SERPL-CCNC: 43 IU/L (ref 39–117)
ALT SERPL-CCNC: 37 IU/L (ref 0–44)
AST SERPL-CCNC: 19 IU/L (ref 0–40)
BASOPHILS # BLD AUTO: 0 X10E3/UL (ref 0–0.2)
BASOPHILS NFR BLD AUTO: 0 %
BILIRUB SERPL-MCNC: 0.4 MG/DL (ref 0–1.2)
BUN SERPL-MCNC: 13 MG/DL (ref 6–24)
BUN/CREAT SERPL: 15 (ref 9–20)
CALCIUM SERPL-MCNC: 10 MG/DL (ref 8.7–10.2)
CHLORIDE SERPL-SCNC: 97 MMOL/L (ref 96–106)
CHOLEST SERPL-MCNC: 148 MG/DL (ref 100–199)
CO2 SERPL-SCNC: 26 MMOL/L (ref 20–29)
CREAT SERPL-MCNC: 0.87 MG/DL (ref 0.76–1.27)
EOSINOPHIL # BLD AUTO: 0 X10E3/UL (ref 0–0.4)
EOSINOPHIL NFR BLD AUTO: 1 %
ERYTHROCYTE [DISTWIDTH] IN BLOOD BY AUTOMATED COUNT: 14.3 % (ref 12.3–15.4)
EST. AVERAGE GLUCOSE BLD GHB EST-MCNC: 126 MG/DL
GLOBULIN SER CALC-MCNC: 2.2 G/DL (ref 1.5–4.5)
GLUCOSE SERPL-MCNC: 129 MG/DL (ref 65–99)
HBA1C MFR BLD: 6 % (ref 4.8–5.6)
HCT VFR BLD AUTO: 41.4 % (ref 37.5–51)
HDLC SERPL-MCNC: 39 MG/DL
HGB BLD-MCNC: 14.1 G/DL (ref 13–17.7)
IMM GRANULOCYTES # BLD AUTO: 0 X10E3/UL (ref 0–0.1)
IMM GRANULOCYTES NFR BLD AUTO: 1 %
LDLC SERPL CALC-MCNC: ABNORMAL MG/DL (ref 0–99)
LYMPHOCYTES # BLD AUTO: 2.1 X10E3/UL (ref 0.7–3.1)
LYMPHOCYTES NFR BLD AUTO: 36 %
MCH RBC QN AUTO: 32.9 PG (ref 26.6–33)
MCHC RBC AUTO-ENTMCNC: 34.1 G/DL (ref 31.5–35.7)
MCV RBC AUTO: 97 FL (ref 79–97)
MONOCYTES # BLD AUTO: 0.5 X10E3/UL (ref 0.1–0.9)
MONOCYTES NFR BLD AUTO: 9 %
NEUTROPHILS # BLD AUTO: 3.2 X10E3/UL (ref 1.4–7)
NEUTROPHILS NFR BLD AUTO: 53 %
PLATELET # BLD AUTO: 167 X10E3/UL (ref 150–450)
POTASSIUM SERPL-SCNC: 4 MMOL/L (ref 3.5–5.2)
PROT SERPL-MCNC: 7 G/DL (ref 6–8.5)
RBC # BLD AUTO: 4.28 X10E6/UL (ref 4.14–5.8)
SODIUM SERPL-SCNC: 139 MMOL/L (ref 134–144)
T4 FREE SERPL-MCNC: 0.96 NG/DL (ref 0.82–1.77)
TRIGL SERPL-MCNC: 402 MG/DL (ref 0–149)
TSH SERPL DL<=0.005 MIU/L-ACNC: 2.19 UIU/ML (ref 0.45–4.5)
URATE SERPL-MCNC: 8.4 MG/DL (ref 3.7–8.6)
VLDLC SERPL CALC-MCNC: ABNORMAL MG/DL (ref 5–40)
WBC # BLD AUTO: 5.9 X10E3/UL (ref 3.4–10.8)

## 2019-06-04 NOTE — PROGRESS NOTES
Hi Mr. Alexandro Mohan,   Thanks for completing your labs. They show:  Elevated triglycerides- Have you been taking your atorvastatin daily? If so we will need to increase the dose. High uric acid- we will need to increase your allopurinol dose to 300mg daily. Once I hear back from you I will order the new dosages of medications if indicated. Have you already scheduled with a new PCP. You will need labs rechecked in the next 3-4 months after we make these adjustments. Do not hesitate to contact the office if you have any questions or concerns. Lab Review  The remainder of your labs were stable.    Some labs that may have been tested and their explanation are:  Your electrolytes, kidney & liver function (Metabolic Panel)   Anemia, blood cells (CBC)  Thyroid (TSH + T4, T3)  Hormones (prolactin, vitamin D )   Diabetes (Hemoglobin A1c)   PSA (Prostate Health)    Kind regards,   Dr. Bartolo Denny

## 2019-06-07 DIAGNOSIS — F41.9 ANXIETY: ICD-10-CM

## 2019-06-07 DIAGNOSIS — E78.1 PURE HYPERGLYCERIDEMIA: ICD-10-CM

## 2019-06-07 RX ORDER — VENLAFAXINE HYDROCHLORIDE 75 MG/1
CAPSULE, EXTENDED RELEASE ORAL
Qty: 90 CAP | Refills: 1 | Status: SHIPPED | OUTPATIENT
Start: 2019-06-07 | End: 2020-04-07 | Stop reason: SDUPTHER

## 2019-06-07 RX ORDER — ATORVASTATIN CALCIUM 20 MG/1
20 TABLET, FILM COATED ORAL
Qty: 90 TAB | Refills: 1 | Status: SHIPPED | OUTPATIENT
Start: 2019-06-07 | End: 2020-04-07 | Stop reason: SDUPTHER

## 2019-09-05 ENCOUNTER — OFFICE VISIT (OUTPATIENT)
Dept: FAMILY MEDICINE CLINIC | Age: 59
End: 2019-09-05

## 2019-09-05 VITALS
DIASTOLIC BLOOD PRESSURE: 89 MMHG | OXYGEN SATURATION: 96 % | HEIGHT: 70 IN | HEART RATE: 73 BPM | RESPIRATION RATE: 18 BRPM | TEMPERATURE: 97.4 F | SYSTOLIC BLOOD PRESSURE: 135 MMHG | BODY MASS INDEX: 30.18 KG/M2 | WEIGHT: 210.8 LBS

## 2019-09-05 DIAGNOSIS — R73.02 IGT (IMPAIRED GLUCOSE TOLERANCE): ICD-10-CM

## 2019-09-05 DIAGNOSIS — Z23 ENCOUNTER FOR IMMUNIZATION: ICD-10-CM

## 2019-09-05 DIAGNOSIS — E66.9 OBESITY (BMI 30-39.9): ICD-10-CM

## 2019-09-05 DIAGNOSIS — I10 ESSENTIAL HYPERTENSION: ICD-10-CM

## 2019-09-05 DIAGNOSIS — E78.1 HYPERTRIGLYCERIDEMIA: ICD-10-CM

## 2019-09-05 DIAGNOSIS — E78.2 MIXED HYPERLIPIDEMIA: Primary | ICD-10-CM

## 2019-09-05 DIAGNOSIS — E29.1 HYPOGONADISM MALE: ICD-10-CM

## 2019-09-05 DIAGNOSIS — E79.0 HYPERURICEMIA: ICD-10-CM

## 2019-09-05 RX ORDER — AMOXICILLIN 500 MG/1
CAPSULE ORAL
COMMUNITY
Start: 2019-08-30 | End: 2019-09-05

## 2019-09-05 NOTE — PROGRESS NOTES
Chief Complaint   Patient presents with    New Patient     to [facility]       Reviewed Record in preparation for visit and have obtained necessary documentation. Identified pt with two pt identifiers (Name @ )    Health Maintenance Due   Topic    Influenza Age 5 to Adult          1. Have you been to the ER, urgent care clinic since your last visit? Hospitalized since your last visit? No      2. Have you seen or consulted any other health care providers outside of the [address] [place] [address] since your last visit? Include any pap smears or colon screening.  no

## 2019-09-05 NOTE — PROGRESS NOTES
[facility] Note    Nanda Beatty is a 62 y.o. male who was seen in clinic today (9/5/2019). Subjective:  Cardiovascular Review:  The patient has pre-diabetes and hyperlipidemia. Diet and Lifestyle: generally follows a low fat low cholesterol diet, generally follows a low sodium diet, exercises regularly, nonsmoker  Home BP Monitoring: is borderline controlled at home, ranging 140's/90's. Pertinent ROS: taking medications as instructed, no medication side effects noted, no TIA's, no chest pain on exertion, no dyspnea on exertion, no swelling of ankles. Patient seen by urology, Dr. Isidoro Ryder for hypogonadism. Receiving testosterone injections every 2 weeks. Taking Effexor XR 75 mg with waning control of depression symptoms. Patient had colonoscopy in 2017, repeat in 10 years. Prior to Admission medications    Medication Sig Start Date End Date Taking? Authorizing Provider   varicella-zoster recombinant, PF, (SHINGRIX, PF,) 50 mcg/0.5 mL susr injection 0.5 mL by IntraMUSCular route once for 1 dose. 2nd dose administer in 2 months. 9/5/19 9/5/19 Yes Jimbo Jarrett NP   allopurinol (ZYLOPRIM) 300 mg tablet Take 1 Tab by mouth daily. Patient taking differently: Take 200 mg by mouth daily. 6/7/19  Yes Naye Fernández MD   atorvastatin (LIPITOR) 20 mg tablet Take 1 Tab by mouth nightly. 6/7/19  Yes Naye Fernández MD   venlafaxine-SR [facility] 75 mg capsule TAKE 1 CAPSULES BY MOUTH  EVERY DAY 6/7/19  Yes Naye Fernández MD   Syringe with Needle, Disp, 3 mL 22 x 1 1/2\" syrg Use to inject testosterone 1/16/19  Yes Naye Fernández MD   Needle, Disp, 18 G 18 gauge x 1 1/2\" ndle Use to inject testosterone 1/16/19  Yes Naye Fernández MD   colchicine (MITIGARE) 0.6 mg capsule Take 1 Cap by mouth daily.  (If signs of gout flare,take 2 tabs at  once then in one hour take 1 tab by mouth) 3/8/18  Yes Naye Fernández MD omeprazole (PRILOSEC) 20 mg capsule Take 20 mg by mouth daily. Yes Provider, Historical   testosterone cypionate (DEPOTESTOTERONE CYPIONATE) 200 mg/mL injection by IntraMUSCular route every fourteen (14) days. 8/9/17  Yes Samir Valdes MD   sildenafil citrate (VIAGRA) 100 mg tablet Take 1 Tab by mouth as needed. 6/22/17  Yes Samir Valdes MD   cetirizine (ZYRTEC) 10 mg tablet Take 10 mg by mouth daily. Yes Provider, Historical          Allergies   Allergen Reactions    Morphine Itching    Pcn [Penicillins] Itching           ROS  See HPI    Objective:   Physical Exam   Constitutional: He is oriented to person, place, and time. He appears well-developed and well-nourished. Neck: Normal range of motion. Neck supple. No JVD present. Carotid bruit is not present. No thyromegaly present. Cardiovascular: Normal rate, regular rhythm and intact distal pulses. Exam reveals no gallop and no friction rub. No murmur heard. Pulmonary/Chest: Effort normal and breath sounds normal. No respiratory distress. Musculoskeletal: He exhibits no edema. Lymphadenopathy:     He has no cervical adenopathy. Neurological: He is alert and oriented to person, place, and time. Psychiatric: He has a normal mood and affect. His behavior is normal.   Nursing note and vitals reviewed. Visit Vitals  /89 (BP 1 Location: Left arm, BP Patient Position: Sitting)   Pulse 73   Temp 97.4 °F (36.3 °C)   Resp 18   Ht 5' 9.5\" (1.765 m)   Wt 210 lb 12.8 oz (95.6 kg)   SpO2 96%   BMI 30.68 kg/m²       Assessment & Plan:  Diagnoses and all orders for this visit:    1. Mixed hyperlipidemia  Continue Lipitor 30 mg, recheck labs. -     CBC W/O DIFF  -     METABOLIC PANEL, COMPREHENSIVE  -     LIPID PANEL    2. Hypertriglyceridemia  Reviewed diet and lifestyle changes.  -     LIPID PANEL    3. Essential hypertension  Reviewed low sodium diet and weight loss. 4. Obesity (BMI 30-39. 9)  Discussed need for weight loss through diet and exercise. Reviewed decreased caloric intake and increased activity. 5. Hyperuricemia  Continue Allopurinol 200 mg.   -     URIC ACID    6. IGT (impaired glucose tolerance)  -     HEMOGLOBIN A1C W/O EAG    7. Hypogonadism male  Managed by urology. -     TESTOSTERONE, FREE+TOTAL    8. Encounter for immunization  -     INFLUENZA VIRUS VAC QUAD,SPLIT,PRESV FREE SYRINGE IM  -     varicella-zoster recombinant, PF, (SHINGRIX, PF,) 50 mcg/0.5 mL susr injection; 0.5 mL by IntraMUSCular route once for 1 dose. 2nd dose administer in 2 months. I have discussed the diagnosis with the patient and the intended plan as seen in the above orders. The patient has received an after-visit summary along with patient information handout. I have discussed medication side effects and warnings with the patient as well. Follow-up and Dispositions    · Return in about 6 months (around 3/5/2020) for Annual Exam - 30 minutes.            Marchelle Simmonds, NP

## 2019-09-05 NOTE — PATIENT INSTRUCTIONS

## 2019-09-13 LAB
ALBUMIN SERPL-MCNC: 4.2 G/DL (ref 3.5–5.5)
ALBUMIN/GLOB SERPL: 1.8 {RATIO} (ref 1.2–2.2)
ALP SERPL-CCNC: 49 IU/L (ref 39–117)
ALT SERPL-CCNC: 29 IU/L (ref 0–44)
AST SERPL-CCNC: 26 IU/L (ref 0–40)
BILIRUB SERPL-MCNC: 0.2 MG/DL (ref 0–1.2)
BUN SERPL-MCNC: 13 MG/DL (ref 6–24)
BUN/CREAT SERPL: 14 (ref 9–20)
CALCIUM SERPL-MCNC: 9 MG/DL (ref 8.7–10.2)
CHLORIDE SERPL-SCNC: 96 MMOL/L (ref 96–106)
CHOLEST SERPL-MCNC: 165 MG/DL (ref 100–199)
CO2 SERPL-SCNC: 21 MMOL/L (ref 20–29)
CREAT SERPL-MCNC: 0.96 MG/DL (ref 0.76–1.27)
ERYTHROCYTE [DISTWIDTH] IN BLOOD BY AUTOMATED COUNT: 13.7 % (ref 12.3–15.4)
GLOBULIN SER CALC-MCNC: 2.4 G/DL (ref 1.5–4.5)
GLUCOSE SERPL-MCNC: 134 MG/DL (ref 65–99)
HBA1C MFR BLD: 5.8 % (ref 4.8–5.6)
HCT VFR BLD AUTO: 42.5 % (ref 37.5–51)
HDLC SERPL-MCNC: 23 MG/DL
HGB BLD-MCNC: 14.5 G/DL (ref 13–17.7)
INTERPRETATION, 910389: NORMAL
LDLC SERPL CALC-MCNC: ABNORMAL MG/DL (ref 0–99)
MCH RBC QN AUTO: 33.1 PG (ref 26.6–33)
MCHC RBC AUTO-ENTMCNC: 34.1 G/DL (ref 31.5–35.7)
MCV RBC AUTO: 97 FL (ref 79–97)
PDF IMAGE, 910387: NORMAL
PLATELET # BLD AUTO: 182 X10E3/UL (ref 150–450)
POTASSIUM SERPL-SCNC: 3.9 MMOL/L (ref 3.5–5.2)
PROT SERPL-MCNC: 6.6 G/DL (ref 6–8.5)
RBC # BLD AUTO: 4.38 X10E6/UL (ref 4.14–5.8)
SODIUM SERPL-SCNC: 138 MMOL/L (ref 134–144)
TESTOST FREE SERPL-MCNC: 25.3 PG/ML (ref 7.2–24)
TESTOST SERPL-MCNC: 839.6 NG/DL (ref 264–916)
TRIGL SERPL-MCNC: 1291 MG/DL (ref 0–149)
URATE SERPL-MCNC: 7.6 MG/DL (ref 3.7–8.6)
VLDLC SERPL CALC-MCNC: ABNORMAL MG/DL (ref 5–40)
WBC # BLD AUTO: 6.8 X10E3/UL (ref 3.4–10.8)

## 2020-04-06 ENCOUNTER — TELEPHONE (OUTPATIENT)
Dept: FAMILY MEDICINE CLINIC | Age: 60
End: 2020-04-06

## 2020-04-06 DIAGNOSIS — F41.9 ANXIETY: ICD-10-CM

## 2020-04-06 DIAGNOSIS — E78.1 PURE HYPERGLYCERIDEMIA: ICD-10-CM

## 2020-04-06 DIAGNOSIS — M1A.0720 CHRONIC IDIOPATHIC GOUT INVOLVING TOE OF LEFT FOOT WITHOUT TOPHUS: ICD-10-CM

## 2020-04-06 NOTE — TELEPHONE ENCOUNTER
Last apt here 09/05/19  Was to return 03/05/20  Alllopurirol  06/07/19 # 90 and 1   Atorvastatin 20 mg 06/07/19  90 and 1 rf  Venlafaxine 75 mg   90 1

## 2020-04-06 NOTE — TELEPHONE ENCOUNTER
----- Message from Bell Velasquez sent at 4/3/2020  5:14 PM EDT -----  Regarding: Luiza/Refill  Contact: [phone]    Caller (if not patient): n/a  Relationship of caller (if not patient): n/a  Best contact number(s): [phone]  Name of medication and dosage if known: Venlaslaxine 75mg, Alvastatin, Allopurinol Patients My Chart is not working.    Is patient out of this medication (yes/no): Yes  Pharmacy name: n/a  Pharmacy listed in Chart? (yes/no): yes  Pharmacy phone number: n/a  Date of last visit: n/a  Details to clarify the request: Patient uses [facility] in 90 day supplies

## 2020-04-07 RX ORDER — VENLAFAXINE HYDROCHLORIDE 75 MG/1
CAPSULE, EXTENDED RELEASE ORAL
Qty: 90 CAP | Refills: 1 | Status: SHIPPED | OUTPATIENT
Start: 2020-04-07 | End: 2020-07-29

## 2020-04-07 RX ORDER — VENLAFAXINE HYDROCHLORIDE 75 MG/1
CAPSULE, EXTENDED RELEASE ORAL
Qty: 90 CAP | Refills: 1 | Status: SHIPPED | OUTPATIENT
Start: 2020-04-07 | End: 2020-04-07 | Stop reason: SDUPTHER

## 2020-04-07 RX ORDER — ATORVASTATIN CALCIUM 20 MG/1
20 TABLET, FILM COATED ORAL
Qty: 90 TAB | Refills: 1 | Status: SHIPPED | OUTPATIENT
Start: 2020-04-07 | End: 2020-08-05

## 2020-04-07 RX ORDER — ALLOPURINOL 300 MG/1
300 TABLET ORAL DAILY
Qty: 90 TAB | Refills: 1 | Status: SHIPPED | OUTPATIENT
Start: 2020-04-07 | End: 2020-07-29

## 2020-04-07 RX ORDER — ALLOPURINOL 300 MG/1
300 TABLET ORAL DAILY
Qty: 90 TAB | Refills: 1 | Status: SHIPPED | OUTPATIENT
Start: 2020-04-07 | End: 2020-04-07 | Stop reason: SDUPTHER

## 2020-04-07 RX ORDER — ATORVASTATIN CALCIUM 20 MG/1
20 TABLET, FILM COATED ORAL
Qty: 90 TAB | Refills: 1 | Status: SHIPPED | OUTPATIENT
Start: 2020-04-07 | End: 2020-04-07 | Stop reason: SDUPTHER

## 2020-05-20 ENCOUNTER — VIRTUAL VISIT (OUTPATIENT)
Dept: FAMILY MEDICINE CLINIC | Age: 60
End: 2020-05-20

## 2020-05-20 ENCOUNTER — OFFICE VISIT (OUTPATIENT)
Dept: PRIMARY CARE CLINIC | Age: 60
End: 2020-05-20

## 2020-05-20 ENCOUNTER — NURSE TRIAGE (OUTPATIENT)
Dept: OTHER | Facility: CLINIC | Age: 60
End: 2020-05-20

## 2020-05-20 DIAGNOSIS — K52.9 GASTROENTERITIS: Primary | ICD-10-CM

## 2020-05-20 DIAGNOSIS — R68.89 FLU-LIKE SYMPTOMS: Primary | ICD-10-CM

## 2020-05-20 DIAGNOSIS — R19.7 DIARRHEA, UNSPECIFIED TYPE: ICD-10-CM

## 2020-05-20 DIAGNOSIS — R10.9 ABDOMINAL CRAMPING: ICD-10-CM

## 2020-05-20 NOTE — PATIENT INSTRUCTIONS
Learning About Coronavirus [phone]) Coronavirus (CMUWH-13): Overview What is coronavirus (FSXLG-73)? The coronavirus disease (COVID-19) is caused by a virus. It is an illness that was first found in [place], [place], in December 2019. It has since spread worldwide. The virus can cause fever, cough, and trouble breathing. In severe cases, it can cause pneumonia and make it hard to breathe without help. It can cause death. Coronaviruses are a large group of viruses. They cause the common cold. They also cause more serious illnesses like Middle East respiratory syndrome (MERS) and severe acute respiratory syndrome (SARS). COVID-19 is caused by a novel coronavirus. That means it's a new type that has not been seen in people before. This virus spreads person-to-person through droplets from coughing and sneezing. It can also spread when you are close to someone who is infected. And it can spread when you touch something that has the virus on it, such as a doorknob or a tabletop. What can you do to protect yourself from coronavirus (COVID-19)? The best way to protect yourself from getting sick is to: · Avoid areas where there is an outbreak. · Avoid contact with people who may be infected. · Wash your hands often with soap or alcohol-based hand sanitizers. · Avoid crowds and try to stay at least 6 feet away from other people. · Wash your hands often, especially after you cough or sneeze. Use soap and water, and scrub for at least 20 seconds. If soap and water aren't available, use an alcohol-based hand . · Avoid touching your mouth, nose, and eyes. What can you do to avoid spreading the virus to others? To help avoid spreading the virus to others: 
· Cover your mouth with a tissue when you cough or sneeze. Then throw the tissue in the trash. · Use a disinfectant to clean things that you touch often. · Stay home if you are sick or have been exposed to the virus.  Don't go to school, work, or public areas. And don't use public transportation. · If you are sick: 
? Leave your home only if you need to get medical care. But call the doctor's office first so they know you're coming. And wear a face mask, if you have one. 
? If you have a face mask, wear it whenever you're around other people. It can help stop the spread of the virus when you cough or sneeze. ? Clean and disinfect your home every day. Use household  and disinfectant wipes or sprays. Take special care to clean things that you grab with your hands. These include doorknobs, remote controls, phones, and handles on your refrigerator and microwave. And don't forget countertops, tabletops, bathrooms, and computer keyboards. When to call for help Call 911 anytime you think you may need emergency care. For example, call if: 
· You have severe trouble breathing. (You can't talk at all.) · You have constant chest pain or pressure. · You are severely dizzy or lightheaded. · You are confused or can't think clearly. · Your face and lips have a blue color. · You pass out (lose consciousness) or are very hard to wake up. Call your doctor now if you develop symptoms such as: · Shortness of breath. · Fever. · Cough. If you need to get care, call ahead to the doctor's office for instructions before you go. Make sure you wear a face mask, if you have one, to prevent exposing other people to the virus. Where can you get the latest information? The following health organizations are tracking and studying this virus. Their websites contain the most up-to-date information. Bhavna Belinda also learn what to do if you think you may have been exposed to the virus. · U.S. Centers for Disease Control and Prevention (CDC): The CDC provides updated news about the disease and travel advice. The website also tells you how to prevent the spread of infection.  www.cdc.gov 
 · World Health Organization [facility]): WHO offers information about the virus outbreaks. WHO also has travel advice. www.who.int 
Current as of: April 1, 2020               Content Version: 12.4 © 2006-2020 Healthwise, Incorporated. Care instructions adapted under license by your healthcare professional. If you have questions about a medical condition or this instruction, always ask your healthcare professional. [address] any warranty or liability for your use of this information. Gastroenteritis: Care Instructions Your Care Instructions Gastroenteritis is an illness that may cause nausea, vomiting, and diarrhea. It is sometimes called \"stomach flu. \" It can be caused by bacteria or a virus. You will probably begin to feel better in 1 to 2 days. In the meantime, get plenty of rest and make sure you do not become dehydrated. Dehydration occurs when your body loses too much fluid. Follow-up care is a key part of your treatment and safety. Be sure to make and go to all appointments, and call your doctor if you are having problems. It's also a good idea to know your test results and keep a list of the medicines you take. How can you care for yourself at home? · If your doctor prescribed antibiotics, take them as directed. Do not stop taking them just because you feel better. You need to take the full course of antibiotics. · Drink plenty of fluids to prevent dehydration, enough so that your urine is light yellow or clear like water. Choose water and other caffeine-free clear liquids until you feel better. If you have kidney, heart, or liver disease and have to limit fluids, talk with your doctor before you increase your fluid intake. · Drink fluids slowly, in frequent, small amounts, because drinking too much too fast can cause vomiting.  
· Begin eating mild foods, such as dry toast, yogurt, applesauce, bananas, and rice. Avoid spicy, hot, or high-fat foods, and do not drink alcohol or caffeine for a day or two. Do not drink milk or eat ice cream until you are feeling better. How to prevent gastroenteritis · Keep hot foods hot and cold foods cold. · Do not eat meats, dressings, salads, or other foods that have been kept at room temperature for more than 2 hours. · Use a thermometer to check your refrigerator. It should be between 34°F and 40°F. 
· Defrost meats in the refrigerator or microwave, not on the kitchen counter. · Keep your hands and your kitchen clean. Wash your hands, cutting boards, and countertops with hot soapy water frequently. · Cook meat until it is well done. · Do not eat raw eggs or uncooked sauces made with raw eggs. · Do not take chances. If food looks or tastes spoiled, throw it out. When should you call for help? Call 911 anytime you think you may need emergency care. For example, call if: 
  · You vomit blood or what looks like coffee grounds.  
  · You passed out (lost consciousness).  
  · You pass maroon or very bloody stools.  
 Call your doctor now or seek immediate medical care if: 
  · You have severe belly pain.  
  · You have signs of needing more fluids. You have sunken eyes, a dry mouth, and pass only a little dark urine.  
  · You feel like you are going to faint.  
  · You have increased belly pain that does not go away in 1 to 2 days.  
  · You have new or increased nausea, or you are vomiting.  
  · You have a new or higher fever.  
  · Your stools are black and tarlike or have streaks of blood.  
 Watch closely for changes in your health, and be sure to contact your doctor if: 
  · You are dizzy or lightheaded.  
  · You urinate less than usual, or your urine is dark yellow or brown.  
  · You do not feel better with each day that goes by. Where can you learn more? Go to http://sami-alex.info/ Enter N142 in the search box to learn more about \"Gastroenteritis: Care Instructions. \" Current as of: January 26, 2020Content Version: 12.4 © 8770-7787 Healthwise, Incorporated. Care instructions adapted under license by [employer] (which disclaims liability or warranty for this information). If you have questions about a medical condition or this instruction, always ask your healthcare professional. [address] any warranty or liability for your use of this information.

## 2020-05-20 NOTE — PROGRESS NOTES
[facility]  Virtual Clinic Note      Subjective:     Chief Complaint   Patient presents with    Diarrhea     Shelbie Mullins is a 61y.o. year old male who presents for virtual evaluation of the following:      Diarrhea  Onset 2020  Stomach cramp  and Monday  Improved today  Some cramping after eating breakfast  BM 5 times today  Associated night sweats, nausea now improved  Tmax 100.1 on Monday  Tx: None  - has imodium at home but not taking  Has been able to tolerate food in the past 24 hours  Did go outside  afternoon to grocery store. Partner is phlebotomist in clinical care who is asymptomatic  Denies blood in stool, vomiting, known exposure, immunodeficency      Review of Systems   Pertinent positives and negative per HPI. All other systems  reviewed are negative for a Comprehensive ROS (10+). Past Medical History:   Diagnosis Date    Anxiety     Gout     Hypercholesterolemia     Hypertension         Social History     Socioeconomic History    Marital status: SINGLE     Spouse name: Not on file    Number of children: Not on file    Years of education: Not on file    Highest education level: Not on file   Occupational History    Not on file   Social Needs    Financial resource strain: Not on file    Food insecurity     Worry: Not on file     Inability: Not on file    Transportation needs     Medical: Not on file     Non-medical: Not on file   Tobacco Use    Smoking status: Former Smoker     Years: 10.00     Last attempt to quit: 2015     Years since quittin.0    Smokeless tobacco: Former User   Substance and Sexual Activity    Alcohol use:  Yes     Alcohol/week: 1.7 - 2.5 standard drinks     Types: 2 - 3 Standard drinks or equivalent per week    Drug use: No    Sexual activity: Yes     Partners: Male   Lifestyle    Physical activity     Days per week: Not on file     Minutes per session: Not on file    Stress: Not on file Relationships    Social connections     Talks on phone: Not on file     Gets together: Not on file     Attends Jew service: Not on file     Active member of club or organization: Not on file     Attends meetings of clubs or organizations: Not on file     Relationship status: Not on file    Intimate partner violence     Fear of current or ex partner: Not on file     Emotionally abused: Not on file     Physically abused: Not on file     Forced sexual activity: Not on file   Other Topics Concern    Not on file   Social History Narrative    Not on file       Family History   Problem Relation Age of Onset    Cancer Father        Current Outpatient Medications   Medication Sig    allopurinoL (ZYLOPRIM) 300 mg tablet Take 1 Tab by mouth daily.  atorvastatin (LIPITOR) 20 mg tablet Take 1 Tab by mouth nightly.  venlafaxine-SR (EFFEXOR-XR) 75 mg capsule TAKE 1 CAPSULES BY MOUTH  EVERY DAY    Syringe with Needle, Disp, 3 mL 22 x 1 1/2\" syrg Use to inject testosterone    Needle, Disp, 18 G 18 gauge x 1 1/2\" ndle Use to inject testosterone    colchicine (MITIGARE) 0.6 mg capsule Take 1 Cap by mouth daily. (If signs of gout flare,take 2 tabs at  once then in one hour take 1 tab by mouth)    omeprazole (PRILOSEC) 20 mg capsule Take 20 mg by mouth daily.  testosterone cypionate (DEPOTESTOTERONE CYPIONATE) 200 mg/mL injection by IntraMUSCular route every fourteen (14) days.  sildenafil citrate (VIAGRA) 100 mg tablet Take 1 Tab by mouth as needed.  cetirizine (ZYRTEC) 10 mg tablet Take 10 mg by mouth daily. No current facility-administered medications for this visit. Objective: There were no vitals filed for this visit. Vitals measurement not available       Physical Examination:  General: Alert, cooperative, no distress, appears stated age. Overweight  Eyes: Conjunctivae clear. Pupils equally round. Extraocular muscles intact.   Ears: Normal appearing external ear   Nose: Nares normal appearing  Mouth/Throat: Lips, mucosa, and tongue normal. Moist mucous membranes. No tonsillar enlargement noted. Neck: Supple, symmetrical, trachea midline, no neck mass visualized. Respiratory: Breathing comfortably, in no acute respiratory distress. Cardiovascular: Visualized extremities without edema. MSK: Upper extremities normal appearing. Skin: No significant erythematous lesions or discoloration noted on facial skin. No rashes or lesions on exposed skin. Neurologic: No facial asymmetry. Normal gaze. Cranial nerves intact. Psychiatric: Affect appropriate. Mood euthymic. Thoughts logical. Speech volume and speed normal. No hallucinations. Well kempt. No visits with results within 3 Month(s) from this visit. Latest known visit with results is:   Office Visit on 09/05/2019   Component Date Value Ref Range Status    TESTOSTERONE, TOTAL, LC/MS 09/10/2019 839.6  264.0 - 916.0 ng/dL Final    Comment: This [employer] LC/MS-MS method is currently certified by the [employer] ([employer]). Adult male reference  interval is based on a population of healthy nonobese males  (BMI <30) between 23and 44years old. [address] [place] [address]  [id number];[id number]. PMID: 43775610. This test was developed and its performance characteristics  determined by [employer]. It has not been cleared or approved  by the Food and Drug Administration.       Free testosterone (Direct) 09/10/2019 25.3* 7.2 - 24.0 pg/mL Final    WBC 09/10/2019 6.8  3.4 - 10.8 x10E3/uL Final    RBC 09/10/2019 4.38  4.14 - 5.80 x10E6/uL Final    HGB 09/10/2019 14.5  13.0 - 17.7 g/dL Final    HCT 09/10/2019 42.5  37.5 - 51.0 % Final    MCV 09/10/2019 97  79 - 97 fL Final    MCH 09/10/2019 33.1* 26.6 - 33.0 pg Final    MCHC 09/10/2019 34.1  31.5 - 35.7 g/dL Final    RDW 09/10/2019 13.7  12.3 - 15.4 % Final    PLATELET 50/66/3016 885  150 - 450 x10E3/uL Final    Glucose 09/10/2019 134* 65 - 99 mg/dL Final    BUN 09/10/2019 13  6 - 24 mg/dL Final    Creatinine 09/10/2019 0.96  0.76 - 1.27 mg/dL Final    GFR est non-AA 09/10/2019 86  >59 mL/min/1.73 Final    GFR est AA 09/10/2019 100  >59 mL/min/1.73 Final    BUN/Creatinine ratio 09/10/2019 14  9 - 20 Final    Sodium 09/10/2019 138  134 - 144 mmol/L Final    Potassium 09/10/2019 3.9  3.5 - 5.2 mmol/L Final    Chloride 09/10/2019 96  96 - 106 mmol/L Final    CO2 09/10/2019 21  20 - 29 mmol/L Final    Calcium 09/10/2019 9.0  8.7 - 10.2 mg/dL Final    Protein, total 09/10/2019 6.6  6.0 - 8.5 g/dL Final    Albumin 09/10/2019 4.2  3.5 - 5.5 g/dL Final    GLOBULIN, TOTAL 09/10/2019 2.4  1.5 - 4.5 g/dL Final    A-G Ratio 09/10/2019 1.8  1.2 - 2.2 Final    Bilirubin, total 09/10/2019 0.2  0.0 - 1.2 mg/dL Final    Alk. phosphatase 09/10/2019 49  39 - 117 IU/L Final    AST (SGOT) 09/10/2019 26  0 - 40 IU/L Final    ALT (SGPT) 09/10/2019 29  0 - 44 IU/L Final    Cholesterol, total 09/10/2019 165  100 - 199 mg/dL Final    Triglyceride 09/10/2019 1,291* 0 - 149 mg/dL Final    Comment: Results confirmed on  dilution.  HDL Cholesterol 09/10/2019 23* >39 mg/dL Final    VLDL, calculated 09/10/2019 Comment  5 - 40 mg/dL Final    Comment: The calculation for the VLDL cholesterol is not valid when  triglyceride level is >400 mg/dL.  LDL, calculated 09/10/2019 Comment  0 - 99 mg/dL Final    Comment: Triglyceride result indicated is too high for an accurate LDL  cholesterol estimation.       Hemoglobin A1c 09/10/2019 5.8* 4.8 - 5.6 % Final    Comment:          Prediabetes: 5.7 - 6.4           Diabetes: >6.4           Glycemic control for adults with diabetes: <7.0      Uric acid 09/10/2019 7.6  3.7 - 8.6 mg/dL Final               Therapeutic target for gout patients: <6.0    INTERPRETATION 09/10/2019 Note   Final    Comment: Medical Director's Note: A Cardiovascular Report was not  sent because both LDL cholesterol and non-HDL cholesterol  results are required to generate a report. A Critical access hospital interpretive report has not been generated  since ordered tests are not currently relevant to the  program.      PDF IMAGE 18/76/0514 Not applicable   Final         Assessment/ Plan:   Diagnoses and all orders for this visit:    1. Gastroenteritis    2. Diarrhea, unspecified type    3. Abdominal cramping        Likely viral gastroenteritis, improving. Unable to monitor vitals on virtual visit. Advised increased po hydration. Add imodium prn. Offered Zofran which patient declined. Advised if COVID19 test is preferred should come to [facility] flu clinic tomorrow. Planning to go to to flu clinic today. We discussed the expected course, resolution and complications of the diagnosis(es) in detail. Medication risks, benefits, costs, interactions, and alternatives were discussed as indicated. I advised him to contact the office if his condition worsens, changes or fails to improve as anticipated. He expressed understanding with the diagnosis(es) and plan. Sandie Beltrán is a 61 y.o. male being evaluated by a video visit encounter for concerns as above. A caregiver was present when appropriate. Due to this being a TeleHealth encounter (During ERWXK-02 public health emergency), evaluation of the following organ systems was limited: Vitals/Constitutional/EENT/Resp/CV/GI//MS/Neuro/Skin/Heme-Lymph-Imm. Pursuant to the emergency declaration under the [facility], 1135 waiver authority and the [employer] and "[employer] General Act, this Virtual  Visit was conducted, with patient's (and/or legal guardian's) consent, to reduce the patient's risk of exposure to COVID-19 and provide necessary medical care. Services were provided through a video synchronous discussion virtually to substitute for in-person clinic visit. Provider was in the office while conducting this encounter. Patient was at home during encounter.    Other persons participating in call: None  Consent:  He and/or his healthcare decision maker is aware that this patient-initiated Telehealth encounter is a billable service, with coverage as determined by his insurance carrier. He is aware that he may receive a bill and has provided verbal consent to proceed: Yes  This virtual visit was conducted via [employer]. Pursuant to the emergency declaration under the [facility], [facility] waiver authority and the [employer] website and [employer] Act, this Virtual  Visit was conducted to reduce the patient's risk of exposure to COVID-19 and provide continuity of care for an established patient. Services were provided through a video synchronous discussion virtually to substitute for in-person clinic visit. Due to this being a TeleHealth evaluation, many elements of the physical examination are unable to be assessed. Total Time: minutes: 11-20 minutes. Educated patient on red flag symptoms to warrant return to clinic or emergency room visit. I have discussed the diagnosis with the patient and the intended plan as seen in the above orders. The patient has been offered or received an after-visit summary and questions were answered concerning future plans. I have discussed medication side effects and warnings with the patient as well. Follow-up and Dispositions    · Return if symptoms worsen or fail to improve.          Signed,    Claudy Conway MD  5/20/2020

## 2020-05-20 NOTE — PROGRESS NOTES
Patient was seen at [facility] flu clinic. Patient consented to receive care and bill insurance. Please seen scanned note for visit documentation.

## 2020-05-20 NOTE — TELEPHONE ENCOUNTER
Call on BS Covid line:    Sunday morning nausea and diarrhea- blood noted- small amount of blood. Has had internal hemorrhoids. Small amount of abdominal pain  Sunday evening- chills     Monday - slight fever of 100.5  Since that time nausea and intermittent chills. Yesterday felt better  Today nausea with diarrhea and no fever  Intermittent dizzy spells but none now. No respiratory issues    Joint pain and muscle pain earlier in the week    Protocol recommendation shared and care advice shared. Call soft transfer to [facility] to schedule an appointment.          Reason for Disposition   MODERATE diarrhea (e.g., 4-6 times / day more than normal) and present > 48 hours (2 days)    Protocols used: [id number]

## 2020-05-22 ENCOUNTER — TELEPHONE (OUTPATIENT)
Dept: FAMILY MEDICINE CLINIC | Age: 60
End: 2020-05-22

## 2020-05-22 NOTE — TELEPHONE ENCOUNTER
Outbound call to patient. Name and  verified. Advised patient that we do not administer shingles vaccine in the office.  He expressed understanding and was appreciative of call

## 2020-05-22 NOTE — TELEPHONE ENCOUNTER
----- Message from Renee Fierro sent at 5/22/2020 10:36 AM EDT -----  Regarding: Luiza/Georges  Contact: [phone]  Appointment Request From: Meka Viera    With Provider: Liana Emanuel NP [-Primary Care Physician-]    Preferred Date Range: From 6/30/2020 To 6/30/2020    Preferred Times: Any    Reason: To address the following health maintenance concerns. Shingrix Vaccine Age 50>    Comments:  I have an appointment with Jocelin Mahajan on this day and am wondering if I could get the shingles vaccine at the same time, if it is administered on site.      Thank you

## 2020-05-24 LAB — SARS-COV-2, NAA: NOT DETECTED

## 2020-05-27 ENCOUNTER — TELEPHONE (OUTPATIENT)
Dept: FAMILY MEDICINE CLINIC | Age: 60
End: 2020-05-27

## 2020-05-27 RX ORDER — COLCHICINE 0.6 MG/1
TABLET ORAL
Qty: 90 TAB | Refills: 1 | Status: SHIPPED | OUTPATIENT
Start: 2020-05-27 | End: 2020-06-09 | Stop reason: SDUPTHER

## 2020-05-27 NOTE — TELEPHONE ENCOUNTER
Fax received from pharmacy. Medication Mitigare 0.6 mg is not covered. Please send covered alternative.  [place]

## 2020-05-27 NOTE — TELEPHONE ENCOUNTER
Call placed to patient.  No answer left message on name confirmed voicemail that alternative mediation was sent to the pharmacy

## 2020-06-09 ENCOUNTER — OFFICE VISIT (OUTPATIENT)
Dept: FAMILY MEDICINE CLINIC | Age: 60
End: 2020-06-09

## 2020-06-09 VITALS
BODY MASS INDEX: 30.03 KG/M2 | RESPIRATION RATE: 16 BRPM | TEMPERATURE: 97.5 F | OXYGEN SATURATION: 97 % | WEIGHT: 209.8 LBS | DIASTOLIC BLOOD PRESSURE: 90 MMHG | HEART RATE: 78 BPM | SYSTOLIC BLOOD PRESSURE: 157 MMHG | HEIGHT: 70 IN

## 2020-06-09 DIAGNOSIS — E66.9 OBESITY (BMI 30-39.9): ICD-10-CM

## 2020-06-09 DIAGNOSIS — E78.1 HYPERTRIGLYCERIDEMIA: ICD-10-CM

## 2020-06-09 DIAGNOSIS — E29.1 HYPOGONADISM MALE: ICD-10-CM

## 2020-06-09 DIAGNOSIS — Z00.00 ENCOUNTER FOR WELLNESS EXAMINATION IN ADULT: Primary | ICD-10-CM

## 2020-06-09 DIAGNOSIS — I10 ESSENTIAL HYPERTENSION: ICD-10-CM

## 2020-06-09 DIAGNOSIS — R73.02 IGT (IMPAIRED GLUCOSE TOLERANCE): ICD-10-CM

## 2020-06-09 DIAGNOSIS — G89.29 CHRONIC LEFT SHOULDER PAIN: ICD-10-CM

## 2020-06-09 DIAGNOSIS — E79.0 HYPERURICEMIA: ICD-10-CM

## 2020-06-09 DIAGNOSIS — M25.512 CHRONIC LEFT SHOULDER PAIN: ICD-10-CM

## 2020-06-09 DIAGNOSIS — E78.2 MIXED HYPERLIPIDEMIA: ICD-10-CM

## 2020-06-09 RX ORDER — ICOSAPENT ETHYL 1000 MG/1
2 CAPSULE ORAL 2 TIMES DAILY WITH MEALS
Qty: 360 CAP | Refills: 1 | Status: SHIPPED | OUTPATIENT
Start: 2020-06-09 | End: 2020-09-30 | Stop reason: SDUPTHER

## 2020-06-09 RX ORDER — COLCHICINE 0.6 MG/1
TABLET ORAL
Qty: 30 TAB | Refills: 1 | Status: SHIPPED | OUTPATIENT
Start: 2020-06-09 | End: 2020-11-11 | Stop reason: SDUPTHER

## 2020-06-09 NOTE — PATIENT INSTRUCTIONS
Well Visit, Men 48 to 72: Care Instructions  Your Care Instructions     Physical exams can help you stay healthy. Your doctor has checked your overall health and may have suggested ways to take good care of yourself. He or she also may have recommended tests. At home, you can help prevent illness with healthy eating, regular exercise, and other steps. Follow-up care is a key part of your treatment and safety. Be sure to make and go to all appointments, and call your doctor if you are having problems. It's also a good idea to know your test results and keep a list of the medicines you take. How can you care for yourself at home? · Reach and stay at a healthy weight. This will lower your risk for many problems, such as obesity, diabetes, heart disease, and high blood pressure. · Get at least 30 minutes of exercise on most days of the week. Walking is a good choice. You also may want to do other activities, such as running, swimming, cycling, or playing tennis or team sports. · Do not smoke. Smoking can make health problems worse. If you need help quitting, talk to your doctor about stop-smoking programs and medicines. These can increase your chances of quitting for good. · Protect your skin from too much sun. When you're outdoors from 10 a.m. to 4 p.m., stay in the shade or cover up with clothing and a hat with a wide brim. Wear sunglasses that block UV rays. Even when it's cloudy, put broad-spectrum sunscreen (SPF 30 or higher) on any exposed skin. · See a dentist one or two times a year for checkups and to have your teeth cleaned. · Wear a seat belt in the car. Follow your doctor's advice about when to have certain tests. These tests can spot problems early. · Cholesterol. Your doctor will tell you how often to have this done based on your overall health and other things that can increase your risk for heart attack and stroke. · Blood pressure.  Have your blood pressure checked during a routine doctor visit. Your doctor will tell you how often to check your blood pressure based on your age, your blood pressure results, and other factors. · Prostate exam. Talk to your doctor about whether you should have a blood test (called a PSA test) for prostate cancer. Experts recommend that you discuss the benefits and risks of the test with your doctor before you decide whether to have this test.  · Diabetes. Ask your doctor whether you should have tests for diabetes. · Vision. Some experts recommend that you have yearly exams for glaucoma and other age-related eye problems starting at age 48. · Hearing. Tell your doctor if you notice any change in your hearing. You can have tests to find out how well you hear. · Colorectal cancer. Your risk for colorectal cancer gets higher as you get older. Some experts say that adults should start regular screening at age 48 and stop at age 76. Others say to start before age 48 or continue after age 76. Talk with your doctor about your risk and when to start and stop screening. · Heart attack and stroke risk. At least every 4 to 6 years, you should have your risk for heart attack and stroke assessed. Your doctor uses factors such as your age, blood pressure, cholesterol, and whether you smoke or have diabetes to show what your risk for a heart attack or stroke is over the next 10 years. · Abdominal aortic aneurysm. Ask your doctor whether you should have a test to check for an aneurysm. You may need a test if you ever smoked or if your parent, brother, sister, or child has had an aneurysm. When should you call for help? Watch closely for changes in your health, and be sure to contact your doctor if you have any problems or symptoms that concern you. Where can you learn more? Go to http://sami-alex.info/  Enter A226 in the search box to learn more about \"Well Visit, Men 48 to 72: Care Instructions. \"  Current as of: August 22, 2019               Content Version: 12.5  © 4478-8023 [employer]. Care instructions adapted under license by [employer] (which disclaims liability or warranty for this information). If you have questions about a medical condition or this instruction, always ask your healthcare professional. [address] any warranty or liability for your use of this information.

## 2020-06-09 NOTE — PROGRESS NOTES
Chief Complaint   Patient presents with    Complete Physical    Shoulder Pain     left shoulder pain. onset a few months ago. 1. Have you been to the ER, urgent care clinic since your last visit? Hospitalized since your last visit? No    2. Have you seen or consulted any other health care providers outside of the [address] [place] [address] since your last visit? Include any pap smears or colon screening. No         Patient presents in office for CPE. C/o left shoulder pain.  Would like to discuss medication alternatives for Gout as cost is $1400/90 day supply

## 2020-06-09 NOTE — PROGRESS NOTES
[facility] Note    Cheryl Almanza is a 61 y.o. male who was seen in clinic today (6/9/2020). Subjective:  Cardiovascular Review:  The patient has pre-diabetes, hypertension and hyperlipidemia. Diet and Lifestyle: generally follows a low fat low cholesterol diet, generally follows a low sodium diet, exercises sporadically, nonsmoker  Home BP Monitoring: is not measured at home. Pertinent ROS: taking medications as instructed, no medication side effects noted, no TIA's, no chest pain on exertion, no dyspnea on exertion, no swelling of ankles. Patient seen by urology for low testosterone and ED. Shoulder Pain  Patient complains of left side shoulder pain. The symptoms began 4 months ago Course of symptoms since onset has been gradually worsening. Pain is described as overall severity = moderate, location: glenohumeral region, worse with overhead movements and worse at night. Symptoms were incited by no known event. Therapy to date includes none. Prior to Admission medications    Medication Sig Start Date End Date Taking? Authorizing Provider   icosapent ethyL (VASCEPA) 1 gram capsule Take 2 Caps by mouth two (2) times daily (with meals). 6/9/20  Yes Mae Hoffmann NP   colchicine (Colcrys) 0.6 mg tablet Take 1 Cap by mouth daily. (If signs of gout flare,take 2 tabs at  once then in one hour take 1 tab by mouth) 6/9/20  Yes Becca Jarrett NP   allopurinoL (ZYLOPRIM) 300 mg tablet Take 1 Tab by mouth daily. 4/7/20  Yes Mae Hoffmann NP   atorvastatin (LIPITOR) 20 mg tablet Take 1 Tab by mouth nightly.  4/7/20  Yes Mae Hoffmann NP   venlafaxine-SR (EFFEXOR-XR) 75 mg capsule TAKE 1 CAPSULES BY MOUTH  EVERY DAY 4/7/20  Yes Mae Hoffmann NP   Syringe with Needle, Disp, 3 mL 22 x 1 1/2\" syrg Use to inject testosterone 1/16/19  Yes Joceline Victoria MD   Needle, Disp, 18 G 18 gauge x 1 1/2\" ndle Use to inject testosterone 1/16/19  Yes Georgia Black MD   omeprazole (PRILOSEC) 20 mg capsule Take 20 mg by mouth daily. Yes Provider, Historical   testosterone cypionate (DEPOTESTOTERONE CYPIONATE) 200 mg/mL injection by IntraMUSCular route every fourteen (14) days. 8/9/17  Yes Georgia Black MD   sildenafil citrate (VIAGRA) 100 mg tablet Take 1 Tab by mouth as needed. 6/22/17  Yes Georgia Black MD   cetirizine (ZYRTEC) 10 mg tablet Take 10 mg by mouth daily. Yes Provider, Historical          Allergies   Allergen Reactions    Morphine Itching    Pcn [Penicillins] Itching           Review of Systems   Constitutional: Negative for malaise/fatigue and weight loss. HENT: Negative for hearing loss. Eyes: Negative for blurred vision. Respiratory: Negative for shortness of breath. Cardiovascular: Negative for chest pain, palpitations and leg swelling. Gastrointestinal: Negative for abdominal pain, constipation, diarrhea and heartburn. Genitourinary: Negative for frequency and urgency. Musculoskeletal: Positive for joint pain (shoulder). Negative for back pain and myalgias. Neurological: Negative for dizziness, weakness and headaches. Endo/Heme/Allergies: Does not bruise/bleed easily. Psychiatric/Behavioral: Negative for depression. Objective:   Physical Exam  Vitals signs and nursing note reviewed. Constitutional:       General: He is not in acute distress. Appearance: He is well-developed. HENT:      Right Ear: Tympanic membrane and ear canal normal.      Left Ear: Tympanic membrane and ear canal normal.      Nose: No mucosal edema. Right Sinus: No maxillary sinus tenderness or frontal sinus tenderness. Left Sinus: No maxillary sinus tenderness or frontal sinus tenderness. Eyes:      Pupils: Pupils are equal, round, and reactive to light. Neck:      Musculoskeletal: Normal range of motion and neck supple. Thyroid: No thyromegaly. Vascular: No carotid bruit or JVD. Cardiovascular:      Rate and Rhythm: Normal rate and regular rhythm. Heart sounds: Normal heart sounds. No murmur. No friction rub. No gallop. Pulmonary:      Effort: Pulmonary effort is normal. No respiratory distress. Breath sounds: Normal breath sounds. No decreased breath sounds, wheezing or rhonchi. Abdominal:      General: Bowel sounds are normal. There is no distension. Palpations: Abdomen is soft. Tenderness: There is no abdominal tenderness. Musculoskeletal:      Left shoulder: He exhibits decreased range of motion (overhead movements). He exhibits no tenderness and normal strength. Lymphadenopathy:      Cervical: No cervical adenopathy. Neurological:      Mental Status: He is alert and oriented to person, place, and time. Psychiatric:         Behavior: Behavior normal.         Visit Vitals  /90 (BP 1 Location: Left arm, BP Patient Position: Sitting)   Pulse 78   Temp 97.5 °F (36.4 °C) (Oral)   Resp 16   Ht 5' 9.5\" (1.765 m)   Wt 209 lb 12.8 oz (95.2 kg)   SpO2 97%   BMI 30.54 kg/m²       Assessment & Plan:  Diagnoses and all orders for this visit:    1. Encounter for wellness examination in adult    2. Essential hypertension  Elevated in clinic. Reviewed low sodium diet and weight loss. Continue home monitoring and call for reading >140/90  -     CBC W/O DIFF  -     METABOLIC PANEL, COMPREHENSIVE    3. Mixed hyperlipidemia  Continue statin therapy. -     LIPID PANEL    4. Hypertriglyceridemia  Reviewed diet and lifestyle changes. Add Vascepa for triglycerides >1000.   -     LIPID PANEL  -     icosapent ethyL (VASCEPA) 1 gram capsule; Take 2 Caps by mouth two (2) times daily (with meals). 5. Obesity (BMI 30-39. 9)  Discussed need for weight loss through diet and exercise. Reviewed decreased caloric intake and increased activity. 6. Hyperuricemia  -     Check URIC ACID  -     colchicine (Colcrys) 0.6 mg tablet; Take 1 Cap by mouth daily.  (If signs of gout flare,take 2 tabs at  once then in one hour take 1 tab by mouth)    7. IGT (impaired glucose tolerance)  Reviewed diet and lifestyle changes.  -     HEMOGLOBIN A1C W/O EAG    8. Hypogonadism male  Managed by urology, no changes. 9. Chronic left shoulder pain  Consider rotator cuff strain. Request orthopedic evaluation and treatment.   -     REFERRAL TO ORTHOPEDICS      I have discussed the diagnosis with the patient and the intended plan as seen in the above orders. The patient has received an after-visit summary along with patient information handout. I have discussed medication side effects and warnings with the patient as well. Follow-up and Dispositions    · Return in about 6 months (around 12/9/2020) for disease management, blood pressure.            Thea Philippe NP

## 2020-06-10 LAB
ALBUMIN SERPL-MCNC: 4.3 G/DL (ref 3.8–4.9)
ALBUMIN/GLOB SERPL: 1.9 {RATIO} (ref 1.2–2.2)
ALP SERPL-CCNC: 53 IU/L (ref 39–117)
ALT SERPL-CCNC: 37 IU/L (ref 0–44)
AST SERPL-CCNC: 20 IU/L (ref 0–40)
BILIRUB SERPL-MCNC: 0.7 MG/DL (ref 0–1.2)
BUN SERPL-MCNC: 16 MG/DL (ref 6–24)
BUN/CREAT SERPL: 18 (ref 9–20)
CALCIUM SERPL-MCNC: 9.1 MG/DL (ref 8.7–10.2)
CHLORIDE SERPL-SCNC: 100 MMOL/L (ref 96–106)
CHOLEST SERPL-MCNC: 195 MG/DL (ref 100–199)
CO2 SERPL-SCNC: 24 MMOL/L (ref 20–29)
CREAT SERPL-MCNC: 0.91 MG/DL (ref 0.76–1.27)
ERYTHROCYTE [DISTWIDTH] IN BLOOD BY AUTOMATED COUNT: 14.6 % (ref 11.6–15.4)
GLOBULIN SER CALC-MCNC: 2.3 G/DL (ref 1.5–4.5)
GLUCOSE SERPL-MCNC: 131 MG/DL (ref 65–99)
HBA1C MFR BLD: 6.2 % (ref 4.8–5.6)
HCT VFR BLD AUTO: 38.8 % (ref 37.5–51)
HDLC SERPL-MCNC: 37 MG/DL
HGB BLD-MCNC: 13.4 G/DL (ref 13–17.7)
INTERPRETATION, 910389: NORMAL
LDLC SERPL CALC-MCNC: ABNORMAL MG/DL (ref 0–99)
MCH RBC QN AUTO: 34 PG (ref 26.6–33)
MCHC RBC AUTO-ENTMCNC: 34.5 G/DL (ref 31.5–35.7)
MCV RBC AUTO: 99 FL (ref 79–97)
PDF IMAGE, 910387: NORMAL
PLATELET # BLD AUTO: 177 X10E3/UL (ref 150–450)
POTASSIUM SERPL-SCNC: 4.2 MMOL/L (ref 3.5–5.2)
PROT SERPL-MCNC: 6.6 G/DL (ref 6–8.5)
RBC # BLD AUTO: 3.94 X10E6/UL (ref 4.14–5.8)
SODIUM SERPL-SCNC: 139 MMOL/L (ref 134–144)
TRIGL SERPL-MCNC: 628 MG/DL (ref 0–149)
URATE SERPL-MCNC: 6.3 MG/DL (ref 3.7–8.6)
VLDLC SERPL CALC-MCNC: ABNORMAL MG/DL (ref 5–40)
WBC # BLD AUTO: 4.7 X10E3/UL (ref 3.4–10.8)

## 2020-06-10 RX ORDER — ZOSTER VACCINE RECOMBINANT, ADJUVANTED 50 MCG/0.5
0.5 KIT INTRAMUSCULAR ONCE
Qty: 1 EACH | Refills: 1 | Status: SHIPPED | OUTPATIENT
Start: 2020-06-10 | End: 2020-06-10

## 2020-06-15 ENCOUNTER — TELEPHONE (OUTPATIENT)
Dept: FAMILY MEDICINE CLINIC | Age: 60
End: 2020-06-15

## 2020-06-15 NOTE — TELEPHONE ENCOUNTER
Incoming fax from [facility] requesting PA on Vascepa.  PA initiated pending approval.

## 2020-06-18 ENCOUNTER — TELEPHONE (OUTPATIENT)
Dept: FAMILY MEDICINE CLINIC | Age: 60
End: 2020-06-18

## 2020-07-29 DIAGNOSIS — F41.9 ANXIETY: ICD-10-CM

## 2020-07-29 DIAGNOSIS — M1A.0720 CHRONIC IDIOPATHIC GOUT INVOLVING TOE OF LEFT FOOT WITHOUT TOPHUS: ICD-10-CM

## 2020-07-29 RX ORDER — VENLAFAXINE HYDROCHLORIDE 75 MG/1
CAPSULE, EXTENDED RELEASE ORAL
Qty: 90 CAP | Refills: 3 | Status: SHIPPED | OUTPATIENT
Start: 2020-07-29 | End: 2020-10-08 | Stop reason: SDUPTHER

## 2020-07-29 RX ORDER — ALLOPURINOL 300 MG/1
TABLET ORAL
Qty: 90 TAB | Refills: 3 | Status: SHIPPED | OUTPATIENT
Start: 2020-07-29 | End: 2021-04-06

## 2020-08-04 ENCOUNTER — VIRTUAL VISIT (OUTPATIENT)
Dept: FAMILY MEDICINE CLINIC | Age: 60
End: 2020-08-04
Payer: COMMERCIAL

## 2020-08-04 DIAGNOSIS — R79.89 ELEVATED LIVER FUNCTION TESTS: ICD-10-CM

## 2020-08-04 DIAGNOSIS — E78.1 HYPERTRIGLYCERIDEMIA: ICD-10-CM

## 2020-08-04 DIAGNOSIS — I10 ESSENTIAL HYPERTENSION: ICD-10-CM

## 2020-08-04 DIAGNOSIS — E78.1 PURE HYPERGLYCERIDEMIA: ICD-10-CM

## 2020-08-04 DIAGNOSIS — E78.5 DYSLIPIDEMIA: Primary | ICD-10-CM

## 2020-08-04 DIAGNOSIS — R79.89 LOW TESTOSTERONE: ICD-10-CM

## 2020-08-04 DIAGNOSIS — R93.1 ELEVATED CORONARY ARTERY CALCIUM SCORE: ICD-10-CM

## 2020-08-04 PROCEDURE — 99214 OFFICE O/P EST MOD 30 MIN: CPT | Performed by: NURSE PRACTITIONER

## 2020-08-05 RX ORDER — ATORVASTATIN CALCIUM 20 MG/1
TABLET, FILM COATED ORAL
Qty: 90 TAB | Refills: 3 | Status: SHIPPED | OUTPATIENT
Start: 2020-08-05 | End: 2020-12-09

## 2020-08-11 ENCOUNTER — TELEPHONE (OUTPATIENT)
Dept: FAMILY MEDICINE CLINIC | Age: 60
End: 2020-08-11

## 2020-08-11 NOTE — TELEPHONE ENCOUNTER
----- Message from Edmar Rick sent at 8/11/2020 11:17 AM EDT -----  Regarding: Luiza/Georges  Contact: [phone]  Appointment Request From: Vicky Kc     With Provider: Froylan Ely NP [-Primary Care Physician-]     Preferred Date Range: Any date 8/11/2020 or later     Preferred Times: Any     Reason: To address the following health maintenance concerns. Shingrix Vaccine Age 50>     Comments:   Please jodi this as completed.  I got the first shingles vaccine on 7/26/2020.    Thanks

## 2020-08-13 DIAGNOSIS — F41.9 ANXIETY: ICD-10-CM

## 2020-08-13 RX ORDER — VENLAFAXINE HYDROCHLORIDE 75 MG/1
CAPSULE, EXTENDED RELEASE ORAL
Qty: 90 CAP | Refills: 3 | Status: CANCELLED | OUTPATIENT
Start: 2020-08-13

## 2020-08-14 NOTE — TELEPHONE ENCOUNTER
Call placed to patient. Name and  verified.  Patient did not need to schedule an appt just wanted to update that he had the first immunization

## 2020-09-30 DIAGNOSIS — E78.1 HYPERTRIGLYCERIDEMIA: ICD-10-CM

## 2020-09-30 NOTE — TELEPHONE ENCOUNTER
Last Visit: VV 8/4/20  MARIALUISA Jarrett  Next Appointment: 12/9/20  MARIALUISA Jarrett  Previous Refill Encounter(s): 6/9/20  360 + 1    Requested Prescriptions     Pending Prescriptions Disp Refills    icosapent ethyL (VASCEPA) 1 gram capsule 360 Cap 1     Sig: Take 2 Caps by mouth two (2) times daily (with meals).

## 2020-10-01 RX ORDER — ICOSAPENT ETHYL 1000 MG/1
2 CAPSULE ORAL 2 TIMES DAILY WITH MEALS
Qty: 360 CAP | Refills: 1 | Status: SHIPPED | OUTPATIENT
Start: 2020-10-01 | End: 2020-12-28

## 2020-10-08 ENCOUNTER — PATIENT MESSAGE (OUTPATIENT)
Dept: FAMILY MEDICINE CLINIC | Age: 60
End: 2020-10-08

## 2020-10-08 DIAGNOSIS — F41.9 ANXIETY: ICD-10-CM

## 2020-10-08 RX ORDER — VENLAFAXINE HYDROCHLORIDE 75 MG/1
CAPSULE, EXTENDED RELEASE ORAL
Qty: 90 CAP | Refills: 3 | Status: SHIPPED | OUTPATIENT
Start: 2020-10-08 | End: 2020-10-14

## 2020-10-08 NOTE — TELEPHONE ENCOUNTER
Per patients my chart should be two capsules daily. He is running out as the new Rx has not been updated. He needs a short supply sent to local pharmacy.

## 2020-10-14 RX ORDER — VENLAFAXINE HYDROCHLORIDE 75 MG/1
150 CAPSULE, EXTENDED RELEASE ORAL DAILY
Qty: 180 CAP | Refills: 3 | Status: SHIPPED | OUTPATIENT
Start: 2020-10-14 | End: 2020-10-16 | Stop reason: SDUPTHER

## 2020-10-15 NOTE — TELEPHONE ENCOUNTER
From: Kathryn Lloyd  To: Gabi Hinkle NP  Sent: 10/8/2020 8:28 AM EDT  Subject: Prescription Question    Shayne Vance -    Please respond to my note and request from 2 days ago regarding my Venlafaxine prescription that needs to be submitted to [facility] of an increase of dosage from 1 to 2 pills per day. I can be reached at [phone].     Thank you

## 2020-10-16 DIAGNOSIS — F41.9 ANXIETY: ICD-10-CM

## 2020-10-16 RX ORDER — VENLAFAXINE HYDROCHLORIDE 75 MG/1
150 CAPSULE, EXTENDED RELEASE ORAL DAILY
Qty: 180 CAP | Refills: 3 | Status: SHIPPED | OUTPATIENT
Start: 2020-10-16 | End: 2021-09-09 | Stop reason: SDUPTHER

## 2020-10-16 NOTE — TELEPHONE ENCOUNTER
Luly Holcomb requesting clarification of Effexor XR rx. There are two sig's listed on rx sent 10/14/20. It looks like it was increased to 2 caps/day but the rx still contained the once a day dosing. I removed the second sig if appropriate. Please review and resend. ThanksMignon    Previous Refill Encounter(s): 10/14/20  180 + 3    Requested Prescriptions     Pending Prescriptions Disp Refills    venlafaxine-SR (EFFEXOR-XR) 75 mg capsule 180 Cap 3     Sig: Take 2 Caps by mouth daily.

## 2020-11-11 DIAGNOSIS — E79.0 HYPERURICEMIA: ICD-10-CM

## 2020-11-11 RX ORDER — COLCHICINE 0.6 MG/1
TABLET ORAL
Qty: 90 TAB | Refills: 1 | Status: SHIPPED | OUTPATIENT
Start: 2020-11-11 | End: 2022-09-02 | Stop reason: SDUPTHER

## 2020-11-11 NOTE — TELEPHONE ENCOUNTER
Last Visit: VV 8/4/20  MARIALUISA Jarrett  Next Appointment: 12/9/20  MARIALUISA Jarrett  Previous Refill Encounter(s): 6/9/20  30 + 1    Requested Prescriptions     Pending Prescriptions Disp Refills    colchicine (Colcrys) 0.6 mg tablet 90 Tab 1     Sig: Take 1 Cap by mouth daily.  (If signs of gout flare,take 2 tabs at  once then in one hour take 1 tab by mouth)

## 2020-12-09 ENCOUNTER — OFFICE VISIT (OUTPATIENT)
Dept: FAMILY MEDICINE CLINIC | Age: 60
End: 2020-12-09
Payer: COMMERCIAL

## 2020-12-09 VITALS
BODY MASS INDEX: 31.04 KG/M2 | TEMPERATURE: 97.7 F | RESPIRATION RATE: 16 BRPM | HEART RATE: 84 BPM | HEIGHT: 70 IN | SYSTOLIC BLOOD PRESSURE: 169 MMHG | DIASTOLIC BLOOD PRESSURE: 106 MMHG | OXYGEN SATURATION: 99 % | WEIGHT: 216.8 LBS

## 2020-12-09 DIAGNOSIS — G89.29 CHRONIC LEFT SHOULDER PAIN: ICD-10-CM

## 2020-12-09 DIAGNOSIS — I10 ESSENTIAL HYPERTENSION: Primary | ICD-10-CM

## 2020-12-09 DIAGNOSIS — R93.1 ELEVATED CORONARY ARTERY CALCIUM SCORE: ICD-10-CM

## 2020-12-09 DIAGNOSIS — M25.512 CHRONIC LEFT SHOULDER PAIN: ICD-10-CM

## 2020-12-09 DIAGNOSIS — E78.5 DYSLIPIDEMIA: ICD-10-CM

## 2020-12-09 DIAGNOSIS — E78.1 HYPERTRIGLYCERIDEMIA: ICD-10-CM

## 2020-12-09 PROCEDURE — 99214 OFFICE O/P EST MOD 30 MIN: CPT | Performed by: NURSE PRACTITIONER

## 2020-12-09 RX ORDER — VALSARTAN 160 MG/1
160 TABLET ORAL DAILY
Qty: 30 TAB | Refills: 1 | Status: SHIPPED | OUTPATIENT
Start: 2020-12-09 | End: 2021-04-06

## 2020-12-09 RX ORDER — ROSUVASTATIN CALCIUM 40 MG/1
40 TABLET, COATED ORAL DAILY
Qty: 90 TAB | Refills: 1 | Status: SHIPPED | OUTPATIENT
Start: 2020-12-09 | End: 2020-12-09 | Stop reason: SDUPTHER

## 2020-12-09 RX ORDER — ROSUVASTATIN CALCIUM 40 MG/1
40 TABLET, COATED ORAL DAILY
Qty: 30 TAB | Refills: 1 | Status: SHIPPED | OUTPATIENT
Start: 2020-12-09 | End: 2021-05-25

## 2020-12-09 RX ORDER — VALSARTAN 160 MG/1
160 TABLET ORAL DAILY
Qty: 90 TAB | Refills: 1 | Status: SHIPPED | OUTPATIENT
Start: 2020-12-09 | End: 2020-12-09 | Stop reason: SDUPTHER

## 2020-12-09 RX ORDER — ROSUVASTATIN CALCIUM 40 MG/1
1 TABLET, COATED ORAL DAILY
COMMUNITY
Start: 2020-11-25 | End: 2020-12-09 | Stop reason: SDUPTHER

## 2020-12-09 NOTE — PROGRESS NOTES
[facility] Note    Surya Cox is a 61 y.o. male who was seen in clinic today (12/9/2020). Subjective:  Cardiovascular Review:  The patient has pre-diabetes, hypertension and hyperlipidemia. Seen by cardiologist/lipidologist Dr MULLER [facility]. Diet and Lifestyle: generally follows a low fat low cholesterol diet, generally follows a low sodium diet, exercises sporadically, nonsmoker  Home BP Monitoring: is elevated at home >140/90  Pertinent ROS: taking medications as instructed, no medication side effects noted, no TIA's, no chest pain on exertion, no dyspnea on exertion, no swelling of ankles. Patient had CT heart scan in 3/2017 showing \"Total calcium score of 332. Moderate evidence of plaque. Moderate nonobstructive coronary artery disease is highly likely. At high risk of future cardiac events. \"     Patient seen by urology for low testosterone and ED. Receiving testosterone injection every two weeks. Patient reports chronic pain of left shoulder. He associates his pain with old injury from weight lifting. Prior to Admission medications    Medication Sig Start Date End Date Taking? Authorizing Provider   valsartan (DIOVAN) 160 mg tablet Take 1 Tab by mouth daily. 12/9/20  Yes Marcello Sampson NP   rosuvastatin (CRESTOR) 40 mg tablet Take 1 Tab by mouth daily. 12/9/20  Yes Juan Miguel Jarrett NP   colchicine (Colcrys) 0.6 mg tablet Take 1 Cap by mouth daily. (If signs of gout flare,take 2 tabs at  once then in one hour take 1 tab by mouth) 11/11/20  Yes Juan Miguel Jarrett NP   venlafaxine-SR [facility] 75 mg capsule Take 2 Caps by mouth daily. 10/16/20  Yes Juan Miguel Jarrett NP   icosapent ethyL (VASCEPA) 1 gram capsule Take 2 Caps by mouth two (2) times daily (with meals).  10/1/20  Yes Marcello Sampson NP   allopurinoL (ZYLOPRIM) 300 mg tablet TAKE 1 TABLET BY MOUTH  DAILY 7/29/20  Yes Juan Miguel Jarrett NP   Syringe with Needle, Disp, 3 mL 22 x 1 1/2\" syrg Use to inject testosterone 1/16/19  Yes Terry Osborne MD   Needle, Disp, 18 G 18 gauge x 1 1/2\" ndle Use to inject testosterone 1/16/19  Yes Terry Osborne MD   omeprazole (PRILOSEC) 20 mg capsule Take 20 mg by mouth daily. Yes Provider, Historical   testosterone cypionate (DEPOTESTOTERONE CYPIONATE) 200 mg/mL injection by IntraMUSCular route every fourteen (14) days. 8/9/17  Yes Terry Osborne MD   sildenafil citrate (VIAGRA) 100 mg tablet Take 1 Tab by mouth as needed. 6/22/17  Yes Terry Osborne MD   cetirizine (ZYRTEC) 10 mg tablet Take 10 mg by mouth daily. Yes Provider, Historical          Allergies   Allergen Reactions    Morphine Itching    Pcn [Penicillins] Itching           ROS  See HPI    Objective:   Physical Exam  Vitals signs and nursing note reviewed. Constitutional:       Appearance: He is well-developed. Neck:      Musculoskeletal: Normal range of motion and neck supple. Thyroid: No thyromegaly. Vascular: No carotid bruit or JVD. Cardiovascular:      Rate and Rhythm: Normal rate and regular rhythm. Heart sounds: No murmur. No friction rub. No gallop. Pulmonary:      Effort: Pulmonary effort is normal. No respiratory distress. Breath sounds: Normal breath sounds. Lymphadenopathy:      Cervical: No cervical adenopathy. Neurological:      Mental Status: He is alert and oriented to person, place, and time. Psychiatric:         Behavior: Behavior normal.           Visit Vitals  BP (!) 169/106 (BP 1 Location: Left arm, BP Patient Position: Sitting)   Pulse 84   Temp 97.7 °F (36.5 °C) (Temporal)   Resp 16   Ht 5' 9.5\" (1.765 m)   Wt 216 lb 12.8 oz (98.3 kg)   SpO2 99%   BMI 31.56 kg/m²       Assessment & Plan:  Diagnoses and all orders for this visit:    1. Essential hypertension  Remains elevated. Begin valsartan. -     METABOLIC PANEL, COMPREHENSIVE; Future  -     CBC WITH AUTOMATED DIFF;  Future  -     valsartan (DIOVAN) 160 mg tablet; Take 1 Tab by mouth daily. 2. Hypertriglyceridemia  Continue Vascepa. Reviewed diet and lifestyle changes. 3. Dyslipidemia  On high dose statin therapy, managed by cardiology.   -      [facility] [address] (WITHOUT GRAPH); Future  -     HEMOGLOBIN A1C WITH EAG; Future  -     rosuvastatin (CRESTOR) 40 mg tablet; Take 1 Tab by mouth daily. 4. Elevated coronary artery calcium score  On high dose statin therapy, managed by cardiology. Needs improved reduction of risk factors. 5. Chronic left shoulder pain  Request orthopedic evaluation and treatment.   -     REFERRAL TO ORTHOPEDIC SURGERY      I have discussed the diagnosis with the patient and the intended plan as seen in the above orders. The patient has received an after-visit summary along with patient information handout. I have discussed medication side effects and warnings with the patient as well. Follow-up and Dispositions    · Return in about 3 months (around 3/9/2021) for blood pressure, disease management.            French Kenyon, MARIALUISA

## 2020-12-09 NOTE — PROGRESS NOTES
Chief Complaint   Patient presents with    Hypertension    Cholesterol Problem     1. Have you been to the ER, urgent care clinic since your last visit? Hospitalized since your last visit? No    2. Have you seen or consulted any other health care providers outside of the [address] [place] [address] since your last visit? Include any pap smears or colon screening.  No

## 2020-12-11 LAB
ALBUMIN SERPL-MCNC: 4 G/DL (ref 3.5–5)
ALBUMIN/GLOB SERPL: 1.3 {RATIO} (ref 1.1–2.2)
ALP SERPL-CCNC: 69 U/L (ref 45–117)
ALT SERPL-CCNC: 147 U/L (ref 12–78)
ANION GAP SERPL CALC-SCNC: 6 MMOL/L (ref 5–15)
AST SERPL-CCNC: 30 U/L (ref 15–37)
BASOPHILS # BLD: 0 K/UL (ref 0–0.1)
BASOPHILS NFR BLD: 1 % (ref 0–1)
BILIRUB SERPL-MCNC: 0.5 MG/DL (ref 0.2–1)
BUN SERPL-MCNC: 15 MG/DL (ref 6–20)
BUN/CREAT SERPL: 16 (ref 12–20)
CALCIUM SERPL-MCNC: 9.8 MG/DL (ref 8.5–10.1)
CHLORIDE SERPL-SCNC: 102 MMOL/L (ref 97–108)
CHOLEST SERPL-MCNC: 131 MG/DL (ref 100–199)
CO2 SERPL-SCNC: 29 MMOL/L (ref 21–32)
CREAT SERPL-MCNC: 0.94 MG/DL (ref 0.7–1.3)
DIFFERENTIAL METHOD BLD: ABNORMAL
EOSINOPHIL # BLD: 0 K/UL (ref 0–0.4)
EOSINOPHIL NFR BLD: 1 % (ref 0–7)
ERYTHROCYTE [DISTWIDTH] IN BLOOD BY AUTOMATED COUNT: 13.4 % (ref 11.5–14.5)
EST. AVERAGE GLUCOSE BLD GHB EST-MCNC: 126 MG/DL
GLOBULIN SER CALC-MCNC: 3.1 G/DL (ref 2–4)
GLUCOSE SERPL-MCNC: 121 MG/DL (ref 65–100)
HBA1C MFR BLD: 6 % (ref 4–5.6)
HCT VFR BLD AUTO: 43.4 % (ref 36.6–50.3)
HDL SERPL-SCNC: 33.8 UMOL/L
HDLC SERPL-MCNC: 28 MG/DL
HGB BLD-MCNC: 14.7 G/DL (ref 12.1–17)
IMM GRANULOCYTES # BLD AUTO: 0 K/UL (ref 0–0.04)
IMM GRANULOCYTES NFR BLD AUTO: 0 % (ref 0–0.5)
LDL SERPL QN: ABNORMAL NM
LDL SERPL-SCNC: <300 NMOL/L
LDL SMALL SERPL-SCNC: <90 NMOL/L
LDLC SERPL CALC-MCNC: 34 MG/DL (ref 0–99)
LP-IR SCORE SERPL: 60
LYMPHOCYTES # BLD: 2.8 K/UL (ref 0.8–3.5)
LYMPHOCYTES NFR BLD: 44 % (ref 12–49)
MCH RBC QN AUTO: 34.4 PG (ref 26–34)
MCHC RBC AUTO-ENTMCNC: 33.9 G/DL (ref 30–36.5)
MCV RBC AUTO: 101.6 FL (ref 80–99)
MONOCYTES # BLD: 0.6 K/UL (ref 0–1)
MONOCYTES NFR BLD: 10 % (ref 5–13)
NEUTS SEG # BLD: 2.7 K/UL (ref 1.8–8)
NEUTS SEG NFR BLD: 44 % (ref 32–75)
NRBC # BLD: 0 K/UL (ref 0–0.01)
NRBC BLD-RTO: 0 PER 100 WBC
PLATELET # BLD AUTO: 177 K/UL (ref 150–400)
PMV BLD AUTO: 11.1 FL (ref 8.9–12.9)
POTASSIUM SERPL-SCNC: 4.6 MMOL/L (ref 3.5–5.1)
PROT SERPL-MCNC: 7.1 G/DL (ref 6.4–8.2)
RBC # BLD AUTO: 4.27 M/UL (ref 4.1–5.7)
SODIUM SERPL-SCNC: 137 MMOL/L (ref 136–145)
TRIGL SERPL-MCNC: 488 MG/DL (ref 0–149)
WBC # BLD AUTO: 6.2 K/UL (ref 4.1–11.1)

## 2020-12-28 DIAGNOSIS — E78.1 HYPERTRIGLYCERIDEMIA: ICD-10-CM

## 2020-12-28 RX ORDER — ICOSAPENT ETHYL 1000 MG/1
CAPSULE ORAL
Qty: 360 CAP | Refills: 3 | Status: SHIPPED | OUTPATIENT
Start: 2020-12-28 | End: 2022-03-28 | Stop reason: SDUPTHER

## 2021-04-05 DIAGNOSIS — I10 ESSENTIAL HYPERTENSION: ICD-10-CM

## 2021-04-05 DIAGNOSIS — M1A.0720 CHRONIC IDIOPATHIC GOUT INVOLVING TOE OF LEFT FOOT WITHOUT TOPHUS: ICD-10-CM

## 2021-04-06 RX ORDER — VALSARTAN 160 MG/1
TABLET ORAL
Qty: 90 TAB | Refills: 3 | Status: SHIPPED | OUTPATIENT
Start: 2021-04-06 | End: 2022-03-28 | Stop reason: SDUPTHER

## 2021-04-06 RX ORDER — ALLOPURINOL 300 MG/1
TABLET ORAL
Qty: 90 TAB | Refills: 3 | Status: SHIPPED | OUTPATIENT
Start: 2021-04-06 | End: 2022-03-28 | Stop reason: SDUPTHER

## 2021-05-17 ENCOUNTER — TELEPHONE (OUTPATIENT)
Dept: FAMILY MEDICINE CLINIC | Age: 61
End: 2021-05-17

## 2021-05-17 NOTE — TELEPHONE ENCOUNTER
Chief Complaint   Patient presents with    Prior Auth     Vascepa 1GM capsules:  APPROVED:  5/17/2021 THROUGH 05/17/2022. [facility] was faxed approval notification at [phone] with confirmation received. Nando Gutierrez LPN    Also noted, patient medication is approved through 6/15/2021 and then the new approval time begins.   Joe Mathis LPN

## 2021-05-21 DIAGNOSIS — E78.5 DYSLIPIDEMIA: ICD-10-CM

## 2021-05-25 RX ORDER — ROSUVASTATIN CALCIUM 40 MG/1
TABLET, COATED ORAL
Qty: 90 TABLET | Refills: 3 | Status: SHIPPED | OUTPATIENT
Start: 2021-05-25 | End: 2022-03-28 | Stop reason: SDUPTHER

## 2021-09-09 DIAGNOSIS — F41.9 ANXIETY: ICD-10-CM

## 2021-09-09 RX ORDER — VENLAFAXINE HYDROCHLORIDE 75 MG/1
150 CAPSULE, EXTENDED RELEASE ORAL DAILY
Qty: 180 CAPSULE | Refills: 0 | Status: SHIPPED | OUTPATIENT
Start: 2021-09-09 | End: 2022-03-28 | Stop reason: SDUPTHER

## 2021-09-09 NOTE — TELEPHONE ENCOUNTER
Last Visit: 12/9/20 MARIALUISA Jarrett  Next Appointment: 9/16/21 MD Hall  Previous Refill Encounter(s): 10/16/20 180 + 3    Requested Prescriptions     Pending Prescriptions Disp Refills    venlafaxine-SR (EFFEXOR-XR) 75 mg capsule 180 Capsule 0     Sig: Take 2 Capsules by mouth daily.

## 2021-12-06 DIAGNOSIS — E78.1 HYPERTRIGLYCERIDEMIA: ICD-10-CM

## 2021-12-06 NOTE — TELEPHONE ENCOUNTER
Last Visit: 12/9/20 NP Iris Manual  Next Appointment: No show 7/9/21, several patient cancellations: 7/16/21, 8/12/21, 9/16/21- Patient needs new provider/appt.   Previous Refill Encounter(s): 12/28/20 360 + 3    Requested Prescriptions     Pending Prescriptions Disp Refills    icosapent ethyL (Vascepa) 1 gram capsule 120 Capsule 0     Sig: TAKE 2 CAPSULES BY MOUTH  TWICE DAILY WITH MEALS

## 2021-12-20 RX ORDER — ICOSAPENT ETHYL 1000 MG/1
CAPSULE ORAL
Qty: 120 CAPSULE | Refills: 0 | OUTPATIENT
Start: 2021-12-20

## 2022-03-18 PROBLEM — I10 ESSENTIAL HYPERTENSION: Status: ACTIVE | Noted: 2020-08-04

## 2022-03-19 PROBLEM — R79.89 LOW TESTOSTERONE: Status: ACTIVE | Noted: 2017-08-09

## 2022-03-19 PROBLEM — R93.1 ELEVATED CORONARY ARTERY CALCIUM SCORE: Status: ACTIVE | Noted: 2018-02-01

## 2022-03-19 PROBLEM — E78.5 DYSLIPIDEMIA: Status: ACTIVE | Noted: 2020-08-04

## 2022-03-25 DIAGNOSIS — I10 ESSENTIAL HYPERTENSION: ICD-10-CM

## 2022-03-25 RX ORDER — VALSARTAN 160 MG/1
160 TABLET ORAL DAILY
Qty: 90 TABLET | Refills: 0 | Status: CANCELLED | OUTPATIENT
Start: 2022-03-25

## 2022-03-25 NOTE — TELEPHONE ENCOUNTER
Last Visit: 12/9/20 NP Luiza  Next Appointment: Not scheduled- multiple cancellations and no shows  Previous Refill Encounter(s): 4/6/21 90 + 3    Requested Prescriptions     Pending Prescriptions Disp Refills    valsartan (DIOVAN) 160 mg tablet 90 Tablet 0     Sig: Take 1 Tablet by mouth daily.

## 2022-03-28 ENCOUNTER — OFFICE VISIT (OUTPATIENT)
Dept: FAMILY MEDICINE CLINIC | Age: 62
End: 2022-03-28
Payer: COMMERCIAL

## 2022-03-28 VITALS
WEIGHT: 211 LBS | HEART RATE: 69 BPM | RESPIRATION RATE: 16 BRPM | TEMPERATURE: 97.4 F | OXYGEN SATURATION: 98 % | SYSTOLIC BLOOD PRESSURE: 132 MMHG | DIASTOLIC BLOOD PRESSURE: 72 MMHG | HEIGHT: 70 IN | BODY MASS INDEX: 30.21 KG/M2

## 2022-03-28 DIAGNOSIS — E78.1 HYPERTRIGLYCERIDEMIA: ICD-10-CM

## 2022-03-28 DIAGNOSIS — I10 ESSENTIAL HYPERTENSION: ICD-10-CM

## 2022-03-28 DIAGNOSIS — R73.03 PRE-DIABETES: ICD-10-CM

## 2022-03-28 DIAGNOSIS — Z87.39 HX OF GOUT: ICD-10-CM

## 2022-03-28 DIAGNOSIS — R93.1 ELEVATED CORONARY ARTERY CALCIUM SCORE: Primary | ICD-10-CM

## 2022-03-28 DIAGNOSIS — F41.9 ANXIETY: ICD-10-CM

## 2022-03-28 DIAGNOSIS — M1A.0720 CHRONIC IDIOPATHIC GOUT INVOLVING TOE OF LEFT FOOT WITHOUT TOPHUS: ICD-10-CM

## 2022-03-28 DIAGNOSIS — R79.89 LOW TESTOSTERONE: ICD-10-CM

## 2022-03-28 DIAGNOSIS — E78.5 DYSLIPIDEMIA: ICD-10-CM

## 2022-03-28 LAB
ALBUMIN SERPL-MCNC: 4 G/DL (ref 3.5–5)
ALBUMIN/GLOB SERPL: 1.4 {RATIO} (ref 1.1–2.2)
ALP SERPL-CCNC: 55 U/L (ref 45–117)
ALT SERPL-CCNC: 111 U/L (ref 12–78)
ANION GAP SERPL CALC-SCNC: 3 MMOL/L (ref 5–15)
AST SERPL-CCNC: 43 U/L (ref 15–37)
BASOPHILS # BLD: 0.1 K/UL (ref 0–0.1)
BASOPHILS NFR BLD: 0 % (ref 0–1)
BILIRUB SERPL-MCNC: 0.4 MG/DL (ref 0.2–1)
BUN SERPL-MCNC: 22 MG/DL (ref 6–20)
BUN/CREAT SERPL: 26 (ref 12–20)
CALCIUM SERPL-MCNC: 8.6 MG/DL (ref 8.5–10.1)
CHLORIDE SERPL-SCNC: 105 MMOL/L (ref 97–108)
CHOLEST SERPL-MCNC: 89 MG/DL
CO2 SERPL-SCNC: 28 MMOL/L (ref 21–32)
CREAT SERPL-MCNC: 0.85 MG/DL (ref 0.7–1.3)
DIFFERENTIAL METHOD BLD: ABNORMAL
EOSINOPHIL # BLD: 0.1 K/UL (ref 0–0.4)
EOSINOPHIL NFR BLD: 1 % (ref 0–7)
ERYTHROCYTE [DISTWIDTH] IN BLOOD BY AUTOMATED COUNT: 13.2 % (ref 11.5–14.5)
EST. AVERAGE GLUCOSE BLD GHB EST-MCNC: 140 MG/DL
GLOBULIN SER CALC-MCNC: 2.8 G/DL (ref 2–4)
GLUCOSE SERPL-MCNC: 129 MG/DL (ref 65–100)
HBA1C MFR BLD: 6.5 % (ref 4–5.6)
HCT VFR BLD AUTO: 40.9 % (ref 36.6–50.3)
HDLC SERPL-MCNC: 41 MG/DL
HDLC SERPL: 2.2 {RATIO} (ref 0–5)
HGB BLD-MCNC: 13.5 G/DL (ref 12.1–17)
IMM GRANULOCYTES # BLD AUTO: 0.1 K/UL (ref 0–0.04)
IMM GRANULOCYTES NFR BLD AUTO: 1 % (ref 0–0.5)
LDLC SERPL CALC-MCNC: 12 MG/DL (ref 0–100)
LYMPHOCYTES # BLD: 3.2 K/UL (ref 0.8–3.5)
LYMPHOCYTES NFR BLD: 25 % (ref 12–49)
MCH RBC QN AUTO: 33.8 PG (ref 26–34)
MCHC RBC AUTO-ENTMCNC: 33 G/DL (ref 30–36.5)
MCV RBC AUTO: 102.3 FL (ref 80–99)
MONOCYTES # BLD: 1 K/UL (ref 0–1)
MONOCYTES NFR BLD: 8 % (ref 5–13)
NEUTS SEG # BLD: 8.3 K/UL (ref 1.8–8)
NEUTS SEG NFR BLD: 65 % (ref 32–75)
NRBC # BLD: 0 K/UL (ref 0–0.01)
NRBC BLD-RTO: 0 PER 100 WBC
PLATELET # BLD AUTO: 167 K/UL (ref 150–400)
PMV BLD AUTO: 10.9 FL (ref 8.9–12.9)
POTASSIUM SERPL-SCNC: 4.8 MMOL/L (ref 3.5–5.1)
PROT SERPL-MCNC: 6.8 G/DL (ref 6.4–8.2)
RBC # BLD AUTO: 4 M/UL (ref 4.1–5.7)
SODIUM SERPL-SCNC: 136 MMOL/L (ref 136–145)
TRIGL SERPL-MCNC: 180 MG/DL (ref ?–150)
URATE SERPL-MCNC: 4.5 MG/DL (ref 3.5–7.2)
VLDLC SERPL CALC-MCNC: 36 MG/DL
WBC # BLD AUTO: 12.8 K/UL (ref 4.1–11.1)

## 2022-03-28 PROCEDURE — 99213 OFFICE O/P EST LOW 20 MIN: CPT | Performed by: NURSE PRACTITIONER

## 2022-03-28 RX ORDER — VALSARTAN 160 MG/1
160 TABLET ORAL DAILY
Qty: 90 TABLET | Refills: 3 | Status: SHIPPED | OUTPATIENT
Start: 2022-03-28 | End: 2022-07-11 | Stop reason: DRUGHIGH

## 2022-03-28 RX ORDER — OMEPRAZOLE 20 MG/1
20 CAPSULE, DELAYED RELEASE ORAL DAILY
Qty: 90 CAPSULE | Refills: 3 | Status: SHIPPED | OUTPATIENT
Start: 2022-03-28

## 2022-03-28 RX ORDER — ICOSAPENT ETHYL 1000 MG/1
2 CAPSULE ORAL 2 TIMES DAILY WITH MEALS
Qty: 360 CAPSULE | Refills: 3 | Status: SHIPPED | OUTPATIENT
Start: 2022-03-28

## 2022-03-28 RX ORDER — ALLOPURINOL 300 MG/1
300 TABLET ORAL DAILY
Qty: 90 TABLET | Refills: 3 | Status: SHIPPED | OUTPATIENT
Start: 2022-03-28

## 2022-03-28 RX ORDER — VENLAFAXINE HYDROCHLORIDE 75 MG/1
150 CAPSULE, EXTENDED RELEASE ORAL DAILY
Qty: 180 CAPSULE | Refills: 3 | Status: SHIPPED | OUTPATIENT
Start: 2022-03-28

## 2022-03-28 RX ORDER — ROSUVASTATIN CALCIUM 40 MG/1
40 TABLET, COATED ORAL DAILY
Qty: 90 TABLET | Refills: 3 | Status: SHIPPED | OUTPATIENT
Start: 2022-03-28

## 2022-03-28 RX ORDER — CETIRIZINE HCL 10 MG
10 TABLET ORAL DAILY
Qty: 90 TABLET | Refills: 3 | Status: SHIPPED | OUTPATIENT
Start: 2022-03-28

## 2022-03-28 NOTE — PATIENT INSTRUCTIONS
Learning About High Blood Pressure  What is high blood pressure? Blood pressure is a measure of how hard the blood pushes against the walls of your arteries. It's normal for blood pressure to go up and down throughout the day. But if it stays up, you have high blood pressure. Another name for high blood pressure is hypertension. Two numbers tell you your blood pressure. The first number is the systolic pressure (top number). It shows how hard the blood pushes when your heart is pumping. The second number is the diastolic pressure (bottom number). It shows how hard the blood pushes between heartbeats, when your heart is relaxed and filling with blood. Your doctor will give you a goal for your blood pressure based on your health and your age. High blood pressure (hypertension) means that the top number stays high, or the bottom number stays high, or both. High blood pressure increases the risk of stroke, heart attack, and other problems. What happens when you have high blood pressure? · Blood flows through your arteries with too much force. Over time, this can damage the heart and the walls of your arteries. But you can't feel it. High blood pressure usually doesn't cause symptoms. · High blood pressure makes your heart work harder. And that can lead to heart failure, which means your heart doesn't pump as much blood as your body needs. · Fat and calcium start to build up in your arteries. This buildup is called hardening of the arteries. It can cause many problems including a heart attack and stroke. · Arteries also carry blood and oxygen to organs like your eyes, kidneys, and brain. If high blood pressure damages those arteries, it can lead to vision loss, kidney disease, stroke, and a higher risk of dementia. How can you prevent high blood pressure? · Stay at a healthy weight. · Try to limit how much sodium you eat to less than 2,300 milligrams (mg) a day.  If you limit your sodium to 1,500 mg a day, you can lower your blood pressure even more. ? Buy foods that are labeled \"unsalted,\" \"sodium-free,\" or \"low-sodium. \" Foods labeled \"reduced-sodium\" and \"light sodium\" may still have too much sodium. ? Flavor your food with garlic, lemon juice, onion, vinegar, herbs, and spices instead of salt. Do not use soy sauce, steak sauce, onion salt, garlic salt, mustard, or ketchup on your food. ? Use less salt (or none) when recipes call for it. You can often use half the salt a recipe calls for without losing flavor. · Be physically active. Get at least 30 minutes of exercise on most days of the week. Walking is a good choice. You also may want to do other activities, such as running, swimming, cycling, or playing tennis or team sports. · Limit alcohol to 2 drinks a day for men and 1 drink a day for women. · Eat plenty of fruits, vegetables, and low-fat dairy products. Eat less saturated and total fats. How is high blood pressure treated? · Your doctor will suggest making lifestyle changes to help your heart. For example, your doctor may ask you to eat healthy foods, quit smoking, lose extra weight, and be more active. · If lifestyle changes don't help enough, your doctor may recommend that you take medicine. · When blood pressure is very high, medicines are needed to lower it. Follow-up care is a key part of your treatment and safety. Be sure to make and go to all appointments, and call your doctor if you are having problems. It's also a good idea to know your test results and keep a list of the medicines you take. Where can you learn more? Go to http://www.[employer].com/  Enter P501 in the search box to learn more about \"Learning About High Blood Pressure. \"  Current as of: January 10, 2022               Content Version: 13.2  © 4140-8105 Healthwise, Incorporated.    Care instructions adapted under license by [employer] (which disclaims liability or warranty for this information). If you have questions about a medical condition or this instruction, always ask your healthcare professional. [place] [address] any warranty or liability for your use of this information.

## 2022-03-28 NOTE — PROGRESS NOTES
Chief Complaint   Patient presents with   [facility]    Medication Refill         1. \"Have you been to the ER, urgent care clinic since your last visit? Hospitalized since your last visit? \" No    2. \"Have you seen or consulted any other health care providers outside of the [address] [place] [address] since your last visit? \" Yes When: 3/21/22 Where: [facility] Reason for visit: shoulder pain     3. For patients over 45: Has the patient had a colonoscopy?  Yes - no Care Gap present     3 most recent PHQ Screens 3/28/2022   Little interest or pleasure in doing things Not at all   Feeling down, depressed, irritable, or hopeless Not at all   Total Score PHQ 2 0       Health Maintenance Due   Topic Date Due    COVID-19 Vaccine (2 - Pfizer 3-dose series) 04/16/2021    Depression Screen  12/09/2021    Lipid Screen  12/09/2021

## 2022-03-28 NOTE — PROGRESS NOTES
[facility] [facility] Note     Sree Ross (: 1960) is a 64 y.o. male, established patient, here for evaluation of the following chief complaint(s):  Establish Care and Medication Refill       ASSESSMENT/PLAN:  1. Elevated coronary artery calcium score  -     METABOLIC PANEL, COMPREHENSIVE; Future  -     LIPID PANEL; Future  -     REFERRAL TO CARDIOLOGY- patient to establish with new cardiologist as his previous ones has retired    2. Dyslipidemia  -     METABOLIC PANEL, COMPREHENSIVE; Future  -     LIPID PANEL; Future  -     REFERRAL TO CARDIOLOGY -patient to establish with new cardiologist as his previous ones has retired  -     rosuvastatin (CRESTOR) 40 mg tablet; Take 1 Tablet by mouth daily. , Normal, Disp-90 Tablet, R-3Requesting 1 year supply    3. Chronic idiopathic gout involving toe of left foot without tophus  -     allopurinoL (ZYLOPRIM) 300 mg tablet; Take 1 Tablet by mouth daily. , Normal, Disp-90 Tablet, R-3Requesting 1 year supply    4. Hx of gout  -     URIC ACID; Future    5. Essential hypertension  -     valsartan (DIOVAN) 160 mg tablet; Take 1 Tablet by mouth daily. , Normal, Disp-90 Tablet, R-3Requesting 1 year supply    6. Hypertriglyceridemia  -     icosapent ethyL (Vascepa) 1 gram capsule; Take 2 Capsules by mouth two (2) times daily (with meals). , Normal, Disp-360 Capsule, R-3Requesting 1 year supply    7. Anxiety  - Stable      - venlafaxine-SR (EFFEXOR-XR) 75 mg capsule; Take 2 Capsules by mouth daily. , Normal, Disp-180 Capsule,     8. Pre-diabetes  -     HEMOGLOBIN A1C WITH EAG; Future    Lab Results   Component Value Date/Time    Hemoglobin A1c 6.0 (H) 2020 10:33 AM       9. Low testosterone  -     CBC WITH AUTOMATED DIFF;  Future  - Managed by Urology, Dr. Daniela Hope        Return in about 4 days (around 2022) for Regular Follow-up, physical.      SUBJECTIVE/OBJECTIVE:    Sree Ross is a 64 y.o. male seen today for establish care and medication Refill. He has a complete physical appointment scheduled for 4/1/22. Cardiovascular Review:  The patient has pre-diabetes, hypertension and hyperlipidemia. Seen by cardiologist/lipidologist Dr Denver Robert (retired) . Patient had CT heart scan in 3/2017 showing \"Total calcium score of 332. Moderate evidence of plaque. Moderate nonobstructive coronary artery disease is highly likely. At high risk of future cardiac events. \"   Diet and Lifestyle: generally follows a low fat low cholesterol diet, generally follows a low sodium diet, exercises sporadically, nonsmoker  Home BP Monitoring: is elevated at home >140/90  Pertinent ROS: taking medications as instructed, no medication side effects noted, no TIA's, no chest pain on exertion, no dyspnea on exertion, no swelling of ankles. Anxiety & Depression:  Patient complains of anxiety and depressive disorder. Kathleen Leyva is taking venlafaxine daily and feels stable on this dose. He does endorse significant work stressors which can exacerbate underlying anxiety. He shares that yardwork is therapeutic and helps relieve stress for him. Denies current suicidal and homicidal ideation. Complains of the following side effects from the treatment: none. REVIEW OF SYSTEMS:    Review of Systems   All other systems reviewed and are negative.         VITAL SIGNS:    Wt Readings from Last 3 Encounters:   03/28/22 211 lb (95.7 kg)   12/09/20 216 lb 12.8 oz (98.3 kg)   06/09/20 209 lb 12.8 oz (95.2 kg)     Temp Readings from Last 3 Encounters:   03/28/22 97.4 °F (36.3 °C) (Temporal)   12/09/20 97.7 °F (36.5 °C) (Temporal)   06/09/20 97.5 °F (36.4 °C) (Oral)     BP Readings from Last 3 Encounters:   03/28/22 132/72   12/09/20 (!) 169/106   06/09/20 157/90     Pulse Readings from Last 3 Encounters:   03/28/22 69   12/09/20 84   06/09/20 78           PHYSICAL EXAMINATION:       General: Alert, cooperative, no distress  Respiratory: Breathing comfortably, in no acute respiratory distress. Clear to auscultation bilaterally. Cardiovascular: Regular rate and rhythm, S1, S2 normal, no murmur, click, rub or gallop. Extremities: no edema. Abdomen: Soft, non-tender, not distended. Bowel sounds normal. No masses or organomegaly. MSK: Extremities normal appearing, atraumatic, no effusion. Gait steady and unassisted. Skin: Skin color, texture, turgor normal. No rashes or lesions on exposed skin. Neurologic: A/Ox3  Psychiatric: Normal affect. Mood euthymic. Thoughts logical. Speech volume and speed normal            Treatment risks/benefits/costs/interactions/alternatives discussed with patient. Advised patient to call back or return to office if symptoms worsen/change/persist. If patient cannot reach us or should anything more severe/urgent arise he/she should proceed directly to the nearest emergency department. Discussed expected course/resolution/complications of diagnosis in detail with patient. Patient expressed understanding with the diagnosis and plan. An electronic signature was used to authenticate this note.   -- Hakeem Jain NP

## 2022-04-01 ENCOUNTER — OFFICE VISIT (OUTPATIENT)
Dept: FAMILY MEDICINE CLINIC | Age: 62
End: 2022-04-01
Payer: COMMERCIAL

## 2022-04-01 VITALS
HEART RATE: 77 BPM | HEIGHT: 70 IN | BODY MASS INDEX: 29.92 KG/M2 | WEIGHT: 209 LBS | RESPIRATION RATE: 16 BRPM | OXYGEN SATURATION: 98 % | DIASTOLIC BLOOD PRESSURE: 79 MMHG | TEMPERATURE: 98.3 F | SYSTOLIC BLOOD PRESSURE: 121 MMHG

## 2022-04-01 DIAGNOSIS — Z00.00 WELLNESS EXAMINATION: Primary | ICD-10-CM

## 2022-04-01 DIAGNOSIS — R74.8 LIVER ENZYME ELEVATION: ICD-10-CM

## 2022-04-01 DIAGNOSIS — R73.03 PREDIABETES: ICD-10-CM

## 2022-04-01 PROCEDURE — 99396 PREV VISIT EST AGE 40-64: CPT | Performed by: NURSE PRACTITIONER

## 2022-04-01 NOTE — PROGRESS NOTES
[facility] [facility] Note     Katarina Levine (: 1960) is a 64 y.o. male, established patient, here for evaluation of the following chief complaint(s):  Physical       ASSESSMENT/PLAN:  1. Wellness examination  - Normal exam    2. Prediabetes  - Reviewed and discussed A1C trends with patient. - Previous A1C 6.0 now up to 6.5  -Patient will work to increase aerobic (exercise) activity,  focus on attaining and maintaining a healthy weight and reduction of intake of simple carbohydrates, especially high-glycemic and high-fructose foods. 3. Liver enzyme elevation  - Reviewed and discussed with Mr. Jacques Whitaker. Likely multifactorial.  - Mr. Jacques Whitaker will reduce ETOH  -Should the enzymes remain elevated further consideration towards ultrasound imaging would be made at that time. This was discussed with Mr. Jacques Whitaker      Return in about 3 months (around 2022). SUBJECTIVE/OBJECTIVE:    Katarina Levine is a 64 y.o. male seen today for physical.     Cardiovascular Review:  He has hypertension, hyperlipidemia and prediabetes. Diet and Lifestyle: not attempting to follow a low fat, low cholesterol diet, sedentary  Home BP Monitoring: is not measured at home. Pertinent ROS: taking medications as instructed, no medication side effects noted, no TIA's, no chest pain on exertion, no dyspnea on exertion, no swelling of ankles. Occupation:   Exercise:sendentary  Tobacco: rare  ETOH: 1-2 /night  STD screening: declines/deferred  Colon Cancer Screening: up to date. Hep C: up tp date   PSA: UTD - done by urologist  Lab Results   Component Value Date/Time    Prostate Specific Ag 1.7 2017 09:41 AM    Prostate Specific Ag 1.0 2015 10:06 AM           REVIEW OF SYSTEMS:    Review of Systems   All other systems reviewed and are negative.         VITAL SIGNS:    Wt Readings from Last 3 Encounters:   22 209 lb (94.8 kg)   22 211 lb (95.7 kg) 12/09/20 216 lb 12.8 oz (98.3 kg)     Temp Readings from Last 3 Encounters:   04/01/22 98.3 °F (36.8 °C) (Temporal)   03/28/22 97.4 °F (36.3 °C) (Temporal)   12/09/20 97.7 °F (36.5 °C) (Temporal)     BP Readings from Last 3 Encounters:   04/01/22 121/79   03/28/22 132/72   12/09/20 (!) 169/106     Pulse Readings from Last 3 Encounters:   04/01/22 77   03/28/22 69   12/09/20 84           PHYSICAL EXAMINATION:       General: Alert, cooperative, no distress  Eyes: Conjunctivae clear. Pupils equally round and reactive to light, Extraocular muscles intact. Ears: Normal external ear canals both ears. Nose: Nares normal. Septum midline. Mucosa normal. No drainage or sinus tenderness. Mouth/Throat: Lips, mucosa, and tongue normal. No oropharyngeal erythema. No tonsillar enlargement or exudate. Neck: Supple, symmetrical, trachea midline, no adenopathy. No thyroid enlargement/tenderness/nodules  Respiratory: Breathing comfortably, in no acute respiratory distress. Clear to auscultation bilaterally. Normal inspiratory and expiratory ratio. Cardiovascular: Regular rate and rhythm, S1, S2 normal, no murmur, click, rub or gallop. Extremities: no edema. Pulses 2+ and symmetric radial and dorsalis pedis   Abdomen: Soft, non-tender, not distended. Bowel sounds normal. No masses or organomegaly. MSK: Extremities normal appearing, atraumatic, no effusion. Gait steady and unassisted. Skin: Skin color, texture, turgor normal. No rashes or lesions on exposed skin. Lymph nodes: Cervical, supraclavicular nodes normal.  Neurologic: Cranial nerves II-XII intact. Strength 5/5 grossly. Sensation and reflexes normal throughout. Psychiatric: Normal affect. Mood euthymic. Thoughts logical. Speech volume and speed normal            Treatment risks/benefits/costs/interactions/alternatives discussed with patient.   Advised patient to call back or return to office if symptoms worsen/change/persist. If patient cannot reach us or should anything more severe/urgent arise he/she should proceed directly to the nearest emergency department. Discussed expected course/resolution/complications of diagnosis in detail with patient. Patient expressed understanding with the diagnosis and plan. An electronic signature was used to authenticate this note.   -- Spring Rodríguez NP

## 2022-04-01 NOTE — PROGRESS NOTES
Chief Complaint   Patient presents with    Physical         1. \"Have you been to the ER, urgent care clinic since your last visit? Hospitalized since your last visit? \" No    2. \"Have you seen or consulted any other health care providers outside of the [address] [place] [address] since your last visit? \" No     3. For patients over 45: Has the patient had a colonoscopy?  Yes - no Care Gap present       3 most recent PHQ Screens 3/28/2022   Little interest or pleasure in doing things Not at all   Feeling down, depressed, irritable, or hopeless Not at all   Total Score PHQ 2 0       Health Maintenance Due   Topic Date Due    COVID-19 Vaccine (2 - Pfizer 3-dose series) 04/16/2021

## 2022-04-01 NOTE — PATIENT INSTRUCTIONS
Learning About Low-Carbohydrate Foods  What foods are low in carbohydrate? The foods you eat contain nutrients, such as vitamins and minerals. Carbohydrate is a nutrient. Your body needs the right amount to stay healthy and work as it should. You can use the list below to help you make choices about which foods to eat. Some foods that are lower in carbohydrate include:  Dairy and dairy alternatives  · Cheese  · Cottage cheese  · Cream cheese  · Nut milk (unsweetened)  · Soy milk (unsweetened)  · Yogurt (Greek, plain)  Fruits  · Avocado  · [employer] and other protein foods  · Almonds  · Beef  · Chicken  · Cod  · Eggs  · Halibut  · Peanut butter and other nut butters  · Pistachios  · Pork  · Pumpkin seeds  · Tofu  · Trout  · [place]  · Italian  [place] [place])  · Walnuts  Vegetables  · Broccoli  · Carrots  · Cauliflower  · Green beans  · Mushrooms  · Peppers  · Salad greens  · Spinach  · Tomatoes  Work with your doctor to find out how much of this nutrient you need. Depending on your health, you may need more or less of it in your diet. Where can you learn more? Go to http://www.gray.com/  Enter C470 in the search box to learn more about \"Learning About Low-Carbohydrate Foods. \"  Current as of: September 8, 2021               Content Version: 13.2  © 8617-3001 Healthwise, Incorporated. Care instructions adapted under license by [employer] (which disclaims liability or warranty for this information). If you have questions about a medical condition or this instruction, always ask your healthcare professional. [place] [address] any warranty or liability for your use of this information.

## 2022-04-18 ENCOUNTER — TELEPHONE (OUTPATIENT)
Dept: FAMILY MEDICINE CLINIC | Age: 62
End: 2022-04-18

## 2022-07-11 ENCOUNTER — OFFICE VISIT (OUTPATIENT)
Dept: FAMILY MEDICINE CLINIC | Age: 62
End: 2022-07-11
Payer: COMMERCIAL

## 2022-07-11 VITALS
DIASTOLIC BLOOD PRESSURE: 80 MMHG | OXYGEN SATURATION: 96 % | TEMPERATURE: 97.5 F | SYSTOLIC BLOOD PRESSURE: 139 MMHG | RESPIRATION RATE: 16 BRPM | WEIGHT: 209.8 LBS | HEIGHT: 70 IN | HEART RATE: 83 BPM | BODY MASS INDEX: 30.03 KG/M2

## 2022-07-11 DIAGNOSIS — R73.03 PREDIABETES: ICD-10-CM

## 2022-07-11 DIAGNOSIS — M25.512 CHRONIC LEFT SHOULDER PAIN: Primary | ICD-10-CM

## 2022-07-11 DIAGNOSIS — D72.829 LEUKOCYTOSIS, UNSPECIFIED TYPE: ICD-10-CM

## 2022-07-11 DIAGNOSIS — I10 ESSENTIAL HYPERTENSION: ICD-10-CM

## 2022-07-11 DIAGNOSIS — K42.9 UMBILICAL HERNIA WITHOUT OBSTRUCTION AND WITHOUT GANGRENE: ICD-10-CM

## 2022-07-11 DIAGNOSIS — G89.29 CHRONIC LEFT SHOULDER PAIN: Primary | ICD-10-CM

## 2022-07-11 PROCEDURE — 99214 OFFICE O/P EST MOD 30 MIN: CPT | Performed by: NURSE PRACTITIONER

## 2022-07-11 RX ORDER — VALSARTAN 320 MG/1
320 TABLET ORAL DAILY
Qty: 90 TABLET | Refills: 1 | Status: SHIPPED | OUTPATIENT
Start: 2022-07-11

## 2022-07-11 NOTE — PROGRESS NOTES
[facility] Note     Gennaro Cowden (: 1960) is a 64 y.o. male, established patient, here for evaluation of the following chief complaint(s):  Blood sugar problem (prediabetes)       ASSESSMENT/PLAN:  1. Chronic left shoulder pain  -     REFERRAL TO ORTHOPEDICS    2. Prediabetes  -     METABOLIC PANEL, COMPREHENSIVE; Future  -     CBC WITH AUTOMATED DIFF; Future  - Request A1C from Dr. Bhargav Granda office  - Diet and lifestyle modification encouraged for weight loss and chronic disease prevention/ management    Lab Results   Component Value Date/Time    Hemoglobin A1c 6.5 (H) 2022 10:00 AM       3. Essential hypertension  -     METABOLIC PANEL, COMPREHENSIVE; Future  -     INCREASE valsartan to 325 mg daily  -Continue to track home blood pressure logs and report persistent blood pressure readings greater than 140/90    4. Leukocytosis, unspecified type  -     CBC WITH AUTOMATED DIFF; Future    5. Umbilical hernia without obstruction and without gangrene  -     REFERRAL TO GENERAL SURGERY        Return in about 6 months (around 2023). SUBJECTIVE/OBJECTIVE:    Gennaro Cowden is a 64 y.o. male seen today for DM follow up, HTN and chronic shoulder pain    Mr. Maira Contreras shares he saw his Urologist, Dr. Simms Less last week and was told his A1C was \"ok\". Cardiovascular Review:  He has hypertension, hyperlipidemia, obesity and Prediabetes. Diet and Lifestyle: not attempting to follow a low sodium diet, exercises sporadically  Home BP Monitoring: is borderline controlled at home, ranging 130's/80's. Pertinent ROS: taking medications as instructed, no medication side effects noted, no TIA's, no chest pain on exertion, no dyspnea on exertion, no swelling of ankles. Diabetes Mellitus:  Diabetic ROS - medication compliance: Not currently on medication, diabetic diet compliance: noncompliant much of the time, home glucose monitoring: is not performed.   Further diabetic ROS: no polyuria or polydipsia, no chest pain, dyspnea or TIA's, no numbness, tingling or pain in extremities, weight has decreased. Last eye exam approximately <1yr ago. Shoulder pain:  Patient reports chronic pain of left shoulder. He associates his pain with old injury from weight lifting. He was [facility] by [facility] for impingement syndrome. .  Mr. Cornelio Wood wishes to seek second opinion within the [employer]. REVIEW OF SYSTEMS:    Review of Systems   Musculoskeletal: Positive for arthralgias (chronic left shoulder pain). All other systems reviewed and are negative. VITAL SIGNS:    Wt Readings from Last 3 Encounters:   07/11/22 209 lb 12.8 oz (95.2 kg)   04/01/22 209 lb (94.8 kg)   03/28/22 211 lb (95.7 kg)     Temp Readings from Last 3 Encounters:   07/11/22 97.5 °F (36.4 °C) (Temporal)   04/01/22 98.3 °F (36.8 °C) (Temporal)   03/28/22 97.4 °F (36.3 °C) (Temporal)     BP Readings from Last 3 Encounters:   07/11/22 139/80   04/01/22 121/79   03/28/22 132/72     Pulse Readings from Last 3 Encounters:   07/11/22 83   04/01/22 77   03/28/22 69           PHYSICAL EXAMINATION:       General: Alert, cooperative, no distress  Respiratory: Breathing comfortably, in no acute respiratory distress. Clear to auscultation bilaterally. Cardiovascular: Regular rate and rhythm, S1, S2 normal, no murmur, click, rub or gallop. Extremities: no edema. Abdomen: Soft, non-tender, not distended. All umbilical hernia visualized and palpated on exam it is nontender to palpation, bowel sounds normal. No masses or organomegaly. MSK: diminished ROM to LUE, Extremities normal appearing, atraumatic, no effusion. Gait steady and unassisted. Skin: Skin color, texture, turgor normal. No rashes or lesions on exposed skin. Neurologic: A/Ox3  Psychiatric: Normal affect. Mood euthymic.  Thoughts logical. Speech volume and speed normal            Treatment risks/benefits/costs/interactions/alternatives discussed with patient. Advised patient to call back or return to office if symptoms worsen/change/persist. If patient cannot reach us or should anything more severe/urgent arise he/she should proceed directly to the nearest emergency department. Discussed expected course/resolution/complications of diagnosis in detail with patient. Patient expressed understanding with the diagnosis and plan. An electronic signature was used to authenticate this note.   -- Jayjay Goodwin NP

## 2022-07-11 NOTE — PROGRESS NOTES
Chief Complaint   Patient presents with    Blood sugar problem     prediabetes         1. \"Have you been to the ER, urgent care clinic since your last visit? Hospitalized since your last visit? \" No    2. \"Have you seen or consulted any other health care providers outside of the [address] [place] [address] since your last visit? \" [facility] Reason for visit: routine     3. For patients over 45: Has the patient had a colonoscopy? Yes - no Care Gap present     3 most recent PHQ Screens 7/11/2022   Little interest or pleasure in doing things Not at all   Feeling down, depressed, irritable, or hopeless Not at all   Total Score PHQ 2 0       There are no preventive care reminders to display for this patient. Surgeon Performing Repair (Optional): Dr. Dumont

## 2022-07-11 NOTE — PATIENT INSTRUCTIONS
Prediabetes: Care Instructions  Overview     Prediabetes is a warning sign that you're at risk for getting type 2 diabetes. It means that your blood sugar is higher than it should be. But it's not high enough to be diabetes. The food you eat naturally turns into sugar. Your body uses the sugar for energy. Normally, an organ called the pancreas makes insulin. And insulin allows the sugar in your blood to get into your body's cells. But sometimes the body can't use insulin the right way. So the sugar stays in your blood instead. This is called insulin resistance. The buildup of sugar in your blood means you have prediabetes. The good news is that you may be able to prevent or delay diabetes. Making small lifestyle changes, like getting active and changing your eating habits, may help you get your blood sugar back to normal. You can work with your doctor to make a treatment plan. Follow-up care is a key part of your treatment and safety. Be sure to make and go to all appointments, and call your doctor if you are having problems. It's also a good idea to know your test results and keep a list of the medicines you take. How can you care for yourself at home? · Watch your weight. A healthy weight helps your body use insulin properly. · Limit the amount of calories, sweets, and unhealthy fat you eat. Ask your doctor if you should see a dietitian. A registered dietitian can help you create meal plans that fit your lifestyle. · Get at least 30 minutes of exercise on most days of the week. Exercise helps control your blood sugar. It also helps you maintain a healthy weight. Walking is a good choice. You also may want to do other activities, such as running, swimming, cycling, or playing tennis or team sports. · Do not smoke. Smoking can make prediabetes worse. If you need help quitting, talk to your doctor about stop-smoking programs and medicines. These can increase your chances of quitting for good.   · If your doctor prescribed medicines, take them exactly as prescribed. Call your doctor if you think you are having a problem with your medicine. You will get more details on the specific medicines your doctor prescribes. When should you call for help? Watch closely for changes in your health, and be sure to contact your doctor if:    · You have any symptoms of diabetes. These may include:  ? Being thirsty more often. ? Urinating more. ? Being hungrier. ? Losing weight. ? Being very tired. ? Having blurry vision.     · You have a wound that will not heal.     · You have an infection that will not go away.     · You have problems with your blood pressure.     · You want more information about diabetes and how you can keep from getting it. Where can you learn more? Go to http://www.gray.com/  Enter I222 in the search box to learn more about \"Prediabetes: Care Instructions. \"  Current as of: July 28, 2021               Content Version: 13.2  © 2006-2022 Healthwise, Incorporated. Care instructions adapted under license by [employer] (which disclaims liability or warranty for this information). If you have questions about a medical condition or this instruction, always ask your healthcare professional. [address] any warranty or liability for your use of this information.

## 2022-07-12 LAB
ALBUMIN SERPL-MCNC: 3.8 G/DL (ref 3.5–5)
ALBUMIN/GLOB SERPL: 1.2 {RATIO} (ref 1.1–2.2)
ALP SERPL-CCNC: 58 U/L (ref 45–117)
ALT SERPL-CCNC: 80 U/L (ref 12–78)
ANION GAP SERPL CALC-SCNC: 6 MMOL/L (ref 5–15)
AST SERPL-CCNC: 54 U/L (ref 15–37)
BASOPHILS # BLD: 0 K/UL (ref 0–0.1)
BASOPHILS NFR BLD: 0 % (ref 0–1)
BILIRUB SERPL-MCNC: 0.4 MG/DL (ref 0.2–1)
BUN SERPL-MCNC: 16 MG/DL (ref 6–20)
BUN/CREAT SERPL: 20 (ref 12–20)
CALCIUM SERPL-MCNC: 9.6 MG/DL (ref 8.5–10.1)
CHLORIDE SERPL-SCNC: 103 MMOL/L (ref 97–108)
CO2 SERPL-SCNC: 29 MMOL/L (ref 21–32)
CREAT SERPL-MCNC: 0.81 MG/DL (ref 0.7–1.3)
DIFFERENTIAL METHOD BLD: ABNORMAL
EOSINOPHIL # BLD: 0 K/UL (ref 0–0.4)
EOSINOPHIL NFR BLD: 1 % (ref 0–7)
ERYTHROCYTE [DISTWIDTH] IN BLOOD BY AUTOMATED COUNT: 13.4 % (ref 11.5–14.5)
GLOBULIN SER CALC-MCNC: 3.3 G/DL (ref 2–4)
GLUCOSE SERPL-MCNC: 151 MG/DL (ref 65–100)
HCT VFR BLD AUTO: 41.1 % (ref 36.6–50.3)
HGB BLD-MCNC: 13.8 G/DL (ref 12.1–17)
IMM GRANULOCYTES # BLD AUTO: 0 K/UL (ref 0–0.04)
IMM GRANULOCYTES NFR BLD AUTO: 0 % (ref 0–0.5)
LYMPHOCYTES # BLD: 1.9 K/UL (ref 0.8–3.5)
LYMPHOCYTES NFR BLD: 37 % (ref 12–49)
MCH RBC QN AUTO: 36 PG (ref 26–34)
MCHC RBC AUTO-ENTMCNC: 33.6 G/DL (ref 30–36.5)
MCV RBC AUTO: 107.3 FL (ref 80–99)
MONOCYTES # BLD: 0.6 K/UL (ref 0–1)
MONOCYTES NFR BLD: 11 % (ref 5–13)
NEUTS SEG # BLD: 2.6 K/UL (ref 1.8–8)
NEUTS SEG NFR BLD: 51 % (ref 32–75)
NRBC # BLD: 0 K/UL (ref 0–0.01)
NRBC BLD-RTO: 0 PER 100 WBC
PLATELET # BLD AUTO: 158 K/UL (ref 150–400)
PMV BLD AUTO: 12.1 FL (ref 8.9–12.9)
POTASSIUM SERPL-SCNC: 4.1 MMOL/L (ref 3.5–5.1)
PROT SERPL-MCNC: 7.1 G/DL (ref 6.4–8.2)
RBC # BLD AUTO: 3.83 M/UL (ref 4.1–5.7)
SODIUM SERPL-SCNC: 138 MMOL/L (ref 136–145)
WBC # BLD AUTO: 5.1 K/UL (ref 4.1–11.1)

## 2022-07-13 ENCOUNTER — TELEPHONE (OUTPATIENT)
Dept: SURGERY | Age: 62
End: 2022-07-13

## 2022-07-13 ENCOUNTER — TELEPHONE (OUTPATIENT)
Dept: FAMILY MEDICINE CLINIC | Age: 62
End: 2022-07-13

## 2022-07-13 NOTE — TELEPHONE ENCOUNTER
Attempted to call patient to schedule him and appointment with Farida Cummings for either 7/25 or 7/26 or at [facility] for an umbilical hernia per China Stephen. The patients mailbox ending in [phone] has a full mailbox, we had to leave a voicemail on the number ending in [phone]

## 2022-07-13 NOTE — TELEPHONE ENCOUNTER
Faxed and confirmed.      ----- Message from Mary Duncan NP sent at 7/12/2022  3:16 PM EDT -----  Regarding: records request  Please request last labs (including A1c) and encounter note from [facility]

## 2022-08-02 ENCOUNTER — OFFICE VISIT (OUTPATIENT)
Dept: SURGERY | Age: 62
End: 2022-08-02
Payer: COMMERCIAL

## 2022-08-02 VITALS
WEIGHT: 212 LBS | TEMPERATURE: 98.1 F | BODY MASS INDEX: 30.35 KG/M2 | DIASTOLIC BLOOD PRESSURE: 77 MMHG | HEART RATE: 71 BPM | OXYGEN SATURATION: 98 % | RESPIRATION RATE: 18 BRPM | HEIGHT: 70 IN | SYSTOLIC BLOOD PRESSURE: 134 MMHG

## 2022-08-02 DIAGNOSIS — K42.9 UMBILICAL HERNIA WITHOUT OBSTRUCTION AND WITHOUT GANGRENE: Primary | ICD-10-CM

## 2022-08-02 PROCEDURE — 99202 OFFICE O/P NEW SF 15 MIN: CPT | Performed by: SURGERY

## 2022-08-02 NOTE — PROGRESS NOTES
1. Have you been to the ER, urgent care clinic since your last visit? Hospitalized since your last visit? No    2. Have you seen or consulted any other health care providers outside of the [address] [place] [address] since your last visit? Include any pap smears or colon screening.  No

## 2022-08-02 NOTE — PROGRESS NOTES
Hipolito Collins is a 64 y.o. male who is referred by Abdelrahman Golden NP for further evaluation of an umbilical hernia. Mr. Chary Oropeza tells me that he noted a bulge at his umbilicus approximately six months ago. No significant change in the size of the bulge. Associated periumbilical discomfort with exertion. No nausea or vomiting. No chronic cough or constipation. Found to have an umbilical hernia. He has otherwise been in his usual state of health. Past Medical History:   Diagnosis Date    Anxiety     Gout     Hypercholesterolemia     Hypertension     Umbilical hernia without obstruction and without gangrene 2022     Past Surgical History:   Procedure Laterality Date    HX LUMBAR LAMINECTOMY      HX ORTHOPAEDIC  05/28/15    right humerus    HX TONSILLECTOMY       Family History   Problem Relation Age of Onset    Cancer Father      Social History     Socioeconomic History    Marital status: SINGLE   Tobacco Use    Smoking status: Former     Years: 10.00     Types: Cigarettes     Quit date: 2015     Years since quittin.2    Smokeless tobacco: Former   Substance and Sexual Activity    Alcohol use: Yes     Alcohol/week: 1.7 - 2.5 standard drinks     Types: 2 - 3 Standard drinks or equivalent per week    Drug use: No    Sexual activity: Yes     Partners: Male     Review of systems negative except as noted. Review of Systems   Respiratory:  Negative for cough. Gastrointestinal:  Positive for abdominal pain (Periumbilical.). Negative for constipation, nausea and vomiting. Psychiatric/Behavioral:  The patient is nervous/anxious. Physical Exam  Vitals reviewed. Constitutional:       General: He is not in acute distress. Appearance: Normal appearance. He is obese. HENT:      Head: Normocephalic and atraumatic. Eyes:      General: No scleral icterus. Cardiovascular:      Rate and Rhythm: Normal rate and regular rhythm.    Pulmonary:      Effort: Pulmonary effort is normal.      Breath sounds: Normal breath sounds. Abdominal:      General: There is no distension. Palpations: Abdomen is soft. Tenderness: There is no abdominal tenderness. There is no guarding or rebound. Hernia: A hernia is present. Hernia is present in the umbilical area. Musculoskeletal:         General: Normal range of motion. Cervical back: Neck supple. Lymphadenopathy:      Cervical: No cervical adenopathy. Neurological:      General: No focal deficit present. Mental Status: He is alert. ASSESSMENT and PLAN  Mr. Xavier Shine is a 64 y.o. male with an umbilical hernia. .In view of the findings on H and P, he should benefit from repair since the hernia is bothersome to him. I discussed umbilical hernia repair, possibly with mesh, with him today including the potential risks of bleeding, infection and hernia recurrence. He understands and will contact the office to schedule surgery or if any questions or concerns arise. Activity as tolerated for now. Follow up with Ms. Rivas as scheduled.        CC: Radha Perez NP

## 2022-09-02 DIAGNOSIS — E79.0 HYPERURICEMIA: ICD-10-CM

## 2022-09-02 NOTE — TELEPHONE ENCOUNTER
Call from [employer] for colcrys for patient refill request.  Maribeth Lora previous provider). Thanks, Compa Boss    Last Visit: 7/11/22 NP Latoya Colby  Next Appointment: 1/11/23 NP Latoya Colby  Previous Refill Encounter(s): 11/11/20 90 + 1    Requested Prescriptions     Pending Prescriptions Disp Refills    colchicine (Colcrys) 0.6 mg tablet 90 Tablet 1     Sig: Take 1 Cap by mouth daily. (If signs of gout flare,take 2 tabs at  once then in one hour take 1 tab by mouth)       For Pharmacy [address] [place] [address] in place:   Recommendation Provided To:    Intervention Detail: New Rx: 1, reason: Patient Preference  Gap Closed?:   Intervention Accepted By:   Time Spent (min): 5

## 2022-09-06 RX ORDER — COLCHICINE 0.6 MG/1
TABLET ORAL
Qty: 90 TABLET | Refills: 1 | Status: SHIPPED | OUTPATIENT
Start: 2022-09-06

## 2022-10-13 NOTE — PROGRESS NOTES
[facility] Note      Pinky Webster is a 61 y.o. male who was seen by synchronous (real-time) audio-video technology on 8/4/2020. Consent: Pinky Webster, who was seen by synchronous (real-time) audio-video technology, and/or his healthcare decision maker, is aware that this patient-initiated, Telehealth encounter on 8/4/2020 is a billable service, with coverage as determined by his insurance carrier. He is aware that he may receive a bill and has provided verbal consent to proceed: Yes. Assessment & Plan:   Diagnoses and all orders for this visit:    1. Dyslipidemia  Discussed importance of medication compliance. Continue Atorvastatin. Reviewed diet and lifestyle changes. Request cardiology/lipidology consultation given recent levels and CVD risk factors. -     REFERRAL TO CARDIOLOGY    2. Hypertriglyceridemia  Continue Vascepa. Request cardiology/lipidology consultation given recent levels and CVD risk factors. Avoid alcohol consumption.   -     REFERRAL TO CARDIOLOGY    3. Elevated coronary artery calcium score  Request cardiology/lipidology consultation given recent levels and CVD risk factors. -     REFERRAL TO CARDIOLOGY    4. Essential hypertension  -     REFERRAL TO CARDIOLOGY    5. Low testosterone  Recommend patient hold testosterone therapy given increase in lipids. 6. Elevated liver function tests  Abstain from Etoh. Recheck labs and viral panel in 4 weeks. 712  Subjective:   Pinky Webster is a 61 y.o. male who was seen for Hypertension and Cholesterol Problem    Cardiovascular Review:  The patient has pre-diabetes, hypertension and hyperlipidemia.   Diet and Lifestyle: generally follows a low fat low cholesterol diet, generally follows a low sodium diet, exercises sporadically, nonsmoker  Home BP Monitoring: is elevated at home >140/90  Pertinent ROS: taking medications as instructed, no medication side effects noted, no TIA's, no chest pain on exertion, no dyspnea on exertion, no swelling of ankles. Patient had CT heart scan in 3/2017 showing \"Total calcium score of 332. Moderate evidence of plaque. Moderate nonobstructive coronary artery disease is highly likely. At high risk of future cardiac events. \"     Patient seen by urology for low testosterone and ED. Receiving testosterone injection every two weeks. Cholesterol levels elevated when checked with urology (2020) T, TC: 410,  HLD: 6 - levels significantly higher then when checked in . Patient reports compliance with Vascepa and Atorvastatin. Lab Results   Component Value Date/Time    Cholesterol, total 195 2020 09:07 AM    HDL Cholesterol 37 (L) 2020 09:07 AM    LDL, calculated Comment 2020 09:07 AM    Triglyceride 628 (HH) 2020 09:07 AM     Liver function also elevated  and . Prior to Admission medications    Medication Sig Start Date End Date Taking? Authorizing Provider   venlafaxine-SR [facility]) 75 mg capsule TAKE 1 CAPSULE BY MOUTH  EVERY DAY 20   David Curran NP   allopurinoL (ZYLOPRIM) 300 mg tablet TAKE 1 TABLET BY MOUTH  DAILY 20   David Curran NP   icosapent ethyL (VASCEPA) 1 gram capsule Take 2 Caps by mouth two (2) times daily (with meals). 20   David Curran NP   colchicine (Colcrys) 0.6 mg tablet Take 1 Cap by mouth daily. (If signs of gout flare,take 2 tabs at  once then in one hour take 1 tab by mouth) 20   Benita Jarrett NP   atorvastatin (LIPITOR) 20 mg tablet Take 1 Tab by mouth nightly. 20   David Curran NP   Syringe with Needle, Disp, 3 mL 22 x 1 1/2\" syrg Use to inject testosterone 19   Remona Bumpers, MD   Needle, Disp, 18 G 18 gauge x 1 1/2\" ndle Use to inject testosterone 19   Remona Bumpers, MD   omeprazole (PRILOSEC) 20 mg capsule Take 20 mg by mouth daily.     Provider, Historical   testosterone cypionate (Emily Ping CYPIONATE) 200 mg/mL injection by IntraMUSCular route every fourteen (14) days. 8/9/17   Joe Patel MD   sildenafil citrate (VIAGRA) 100 mg tablet Take 1 Tab by mouth as needed. 6/22/17   Joe Patel MD   cetirizine (ZYRTEC) 10 mg tablet Take 10 mg by mouth daily. Provider, Historical     Allergies   Allergen Reactions    Morphine Itching    Pcn [Penicillins] Itching       Past Surgical History:   Procedure Laterality Date    HX LUMBAR LAMINECTOMY      HX ORTHOPAEDIC  05/28/15    right humerus    HX TONSILLECTOMY       Family History   Problem Relation Age of Onset    Cancer Father        ROS  SEE HPI    Objective: There were no vitals taken for this visit. General: alert, cooperative, no distress   Mental  status: normal mood, behavior, speech, dress, motor activity, and thought processes, able to follow commands   HENT: NCAT   Neck: no visualized mass   Resp: no respiratory distress   Neuro: no gross deficits   Skin: no discoloration or lesions of concern on visible areas   Psychiatric: normal affect, consistent with stated mood, no evidence of hallucinations     We discussed the expected course, resolution and complications of the diagnosis(es) in detail. Medication risks, benefits, costs, interactions, and alternatives were discussed as indicated. I advised him to contact the office if his condition worsens, changes or fails to improve as anticipated. He expressed understanding with the diagnosis(es) and plan. Fatmata Yates is a 61 y.o. male who was evaluated by a video visit encounter for concerns as above. Patient identification was verified prior to start of the visit. A caregiver was present when appropriate. Due to this being a TeleHealth encounter (During [facility] public health emergency), evaluation of the following organ systems was limited: Vitals/Constitutional/EENT/Resp/CV/GI//MS/Neuro/Skin/Heme-Lymph-Imm.   Pursuant to the emergency declaration under the [address] [place] [address] authority and the [employer] Act, this Virtual  Visit was conducted, with patient's (and/or legal guardian's) consent, to reduce the patient's risk of exposure to COVID-19 and provide necessary medical care. Services were provided through a video synchronous discussion virtually to substitute for in-person clinic visit. Patient and provider were located at their individual homes.       Brunilda Evangelista NP 97

## 2022-12-01 ENCOUNTER — OFFICE VISIT (OUTPATIENT)
Dept: ORTHOPEDIC SURGERY | Age: 62
End: 2022-12-01
Payer: COMMERCIAL

## 2022-12-01 VITALS — WEIGHT: 212 LBS | BODY MASS INDEX: 31.4 KG/M2 | HEIGHT: 69 IN

## 2022-12-01 DIAGNOSIS — M75.42 IMPINGEMENT SYNDROME OF LEFT SHOULDER: ICD-10-CM

## 2022-12-01 DIAGNOSIS — M25.512 ACUTE PAIN OF LEFT SHOULDER: Primary | ICD-10-CM

## 2022-12-01 RX ORDER — DICLOFENAC SODIUM 75 MG/1
75 TABLET, DELAYED RELEASE ORAL 2 TIMES DAILY
Qty: 60 TABLET | Refills: 3 | Status: SHIPPED | OUTPATIENT
Start: 2022-12-01

## 2022-12-01 RX ORDER — BUPIVACAINE HYDROCHLORIDE 7.5 MG/ML
3 INJECTION, SOLUTION EPIDURAL; RETROBULBAR ONCE
Status: COMPLETED | OUTPATIENT
Start: 2022-12-01 | End: 2022-12-01

## 2022-12-01 RX ORDER — METHYLPREDNISOLONE ACETATE 80 MG/ML
80 INJECTION, SUSPENSION INTRA-ARTICULAR; INTRALESIONAL; INTRAMUSCULAR; SOFT TISSUE ONCE
Status: COMPLETED | OUTPATIENT
Start: 2022-12-01 | End: 2022-12-01

## 2022-12-01 RX ADMIN — BUPIVACAINE HYDROCHLORIDE 22.5 MG: 7.5 INJECTION, SOLUTION EPIDURAL; RETROBULBAR at 09:07

## 2022-12-01 RX ADMIN — METHYLPREDNISOLONE ACETATE 80 MG: 80 INJECTION, SUSPENSION INTRA-ARTICULAR; INTRALESIONAL; INTRAMUSCULAR; SOFT TISSUE at 09:07

## 2022-12-01 NOTE — PROGRESS NOTES
Hung Medina (: 1960) is a 58 y.o. male, new patient, here for evaluation of the following chief complaint(s):  Shoulder Pain (Left shoulder)       ASSESSMENT/PLAN:  Below is the assessment and plan developed based on review of pertinent history, physical exam, labs, studies, and medications. Findings were discussed with the patient today. We discussed different treatment options he would like to try some home exercises but also would like to try corticosteroid injection for the left shoulder. He will follow-up in the next few months as needed. We discussed the risks and benefits of injection and informed consent was obtained. After a sterile preparation, 3 cc of bupivicaine and 80 mg of Depo-Medrol were injected into the left shoulder. The patient tolerated the procedure well and there were no complications. Post injection pain, blood sugar elevation, skin discoloration, fatty atrophy and the signs of infection were discussed in detail. The patient was instructed to contact us if there were any questions or concerns prior to their follow up appointment. 1. Acute pain of left shoulder  -     XR SHOULDER LT AP/LAT MIN 2 V; Future  2. Impingement syndrome of left shoulder  -     methylPREDNISolone acetate (DEPO-MEDROL) 80 mg/mL injection 80 mg; 80 mg, Intra artICUlar, ONCE, 1 dose, On Thu 22 at 1000  -     bupivacaine (PF) (MARCAINE) 0.75 % (7.5 mg/mL) injection 22.5 mg; 22.5 mg (3 mL), Intra artICUlar, ONCE, 1 dose, On Thu 22 at 1000      Return in about 3 months (around 3/1/2023), or if symptoms worsen or fail to improve. SUBJECTIVE/OBJECTIVE:  Hung Medina (: 1960) is a 58 y.o. male. He notes aching pain in the left shoulder ongoing for the last few months. He denies any specific injury. He notes pain with attempted overhead motion and occasional clicking and catching. Ice and activity modifications provide minimal relief.   He had similar symptoms about a year ago and corticosteroid injection helped        Allergies   Allergen Reactions    Morphine Itching    Pcn [Penicillins] Itching       Current Outpatient Medications   Medication Sig    diclofenac EC (VOLTAREN) 75 mg EC tablet Take 1 Tablet by mouth two (2) times a day. valsartan (DIOVAN) 320 mg tablet TAKE 1 TABLET BY MOUTH  DAILY    colchicine (Colcrys) 0.6 mg tablet Take 1 Cap by mouth daily. (If signs of gout flare,take 2 tabs at  once then in one hour take 1 tab by mouth)    allopurinoL (ZYLOPRIM) 300 mg tablet Take 1 Tablet by mouth daily. cetirizine (ZyrTEC) 10 mg tablet Take 1 Tablet by mouth daily. omeprazole (PRILOSEC) 20 mg capsule Take 1 Capsule by mouth daily. rosuvastatin (CRESTOR) 40 mg tablet Take 1 Tablet by mouth daily. icosapent ethyL (Vascepa) 1 gram capsule Take 2 Capsules by mouth two (2) times daily (with meals). venlafaxine-SR (EFFEXOR-XR) 75 mg capsule Take 2 Capsules by mouth daily. Syringe with Needle, Disp, 3 mL 22 x 1 1/2\" syrg Use to inject testosterone    Needle, Disp, 18 G 18 gauge x 1 1/2\" ndle Use to inject testosterone    testosterone cypionate (DEPOTESTOTERONE CYPIONATE) 200 mg/mL injection by IntraMUSCular route every fourteen (14) days. (Patient not taking: Reported on 2022)    sildenafil citrate (VIAGRA) 100 mg tablet Take 1 Tab by mouth as needed. (Patient not taking: Reported on 2022)     No current facility-administered medications for this visit. Social History     Socioeconomic History    Marital status: SINGLE     Spouse name: Not on file    Number of children: Not on file    Years of education: Not on file    Highest education level: Not on file   Occupational History    Not on file   Tobacco Use    Smoking status: Former     Years: 10.00     Types: Cigarettes     Quit date: 2015     Years since quittin.5    Smokeless tobacco: Former   Substance and Sexual Activity    Alcohol use:  Yes     Alcohol/week: 1.7 - 2.5 standard drinks Types: 2 - 3 Standard drinks or equivalent per week    Drug use: No    Sexual activity: Yes     Partners: Male   Other Topics Concern    Not on file   Social History Narrative    Not on file     Social Determinants of Health     Financial Resource Strain: Not on file   Food Insecurity: Not on file   Transportation Needs: Not on file   Physical Activity: Not on file   Stress: Not on file   Social Connections: Not on file   Intimate Partner Violence: Not on file   Housing Stability: Not on file       Past Surgical History:   Procedure Laterality Date    HX LUMBAR LAMINECTOMY      HX ORTHOPAEDIC  05/28/15    right humerus    HX TONSILLECTOMY         Family History   Problem Relation Age of Onset    Cancer Father                REVIEW OF SYSTEMS:    Patient denies any recent fever, chills, nausea, vomiting, chest pain, or shortness of breath. Vitals:  Ht 5' 9\" (1.753 m)   Wt 212 lb (96.2 kg)   BMI 31.31 kg/m²    Body mass index is 31.31 kg/m². PHYSICAL EXAM:  General exam: Patient is awake, alert, and oriented x3. Well-appearing. No acute distress. Ambulates with a normal gait. Heart/Lungs:  no respiratory distress, palpable pulses    Left shoulder: Neurovascular and sensory intact. There is tenderness to palpation at the anterior lateral shoulder. There is limited passive and active overhead range of motion. Pain is noted with impingement testing including Dominguez exam.  Pain and weakness 4/5 is noted with rotator cuff strength testing including resisted abduction and resisted external rotation. Normal stability. There is some tenderness palpation at the [facility] joint and pain with crossarm exam.        IMAGING:    XR Results (most recent):  Results from Appointment encounter on 12/01/22    XR SHOULDER LT AP/LAT MIN 2 V    Narrative  X-rays of the left shoulder 3 views done today show type II acromion with AC joint space narrowing and osteophyte formation.   Maintained glenohumeral joint space. Results from [place] encounter on 02/01/18    XR FOOT LT MIN 3 V    Narrative  EXAM:  XR FOOT LT MIN 3 V    INDICATION:   gout vs infection. COMPARISON:  6/20/2017    FINDINGS:  Three views of the left foot demonstrate no acute fracture or  dislocation. Alignment is anatomic. No joint space narrowing or erosions are  seen. A miniscule plantar calcaneal heel spur is noted. Nonspecific soft tissue  swelling surrounds the forefoot. Impression  IMPRESSION:  Forefoot soft tissue swelling. No bony abnormality seen. Results from Hospital Encounter encounter on 06/28/17    XR FOOT LT MIN 3 V    Narrative  EXAM:  XR FOOT LT MIN 3 V  Coronal history: Pain and swelling possible foreign body  INDICATION:   pain swelling possible foreign body between base of 1st and 2nd  mtp. COMPARISON:  None. FINDINGS:  Three views of the left foot demonstrate no fracture or other acute  osseous or articular abnormality. The soft tissues are within normal limits. No  radiopaque foreign body. Small tophus adjacent to the distal first phalanx. Impression  IMPRESSION:    No radiopaque foreign body. .         Orders Placed This Encounter    XR SHOULDER LT AP/LAT MIN 2 V     Standing Status:   Future     Number of Occurrences:   1     Standing Expiration Date:   12/2/2023    methylPREDNISolone acetate (DEPO-MEDROL) 80 mg/mL injection 80 mg    bupivacaine (PF) (MARCAINE) 0.75 % (7.5 mg/mL) injection 22.5 mg    diclofenac EC (VOLTAREN) 75 mg EC tablet     Sig: Take 1 Tablet by mouth two (2) times a day. Dispense:  60 Tablet     Refill:  3              An electronic signature was used to authenticate this note.   -- Alexandru Can, DO

## 2022-12-01 NOTE — PATIENT INSTRUCTIONS
Shoulder Stretches: Exercises  Introduction  Here are some examples of exercises for you to try. The exercises may be suggested for a condition or for rehabilitation. Start each exercise slowly. Ease off the exercises if you start to have pain. You will be told when to start these exercises and which ones will work best for you. How to do the exercises  Shoulder stretch   a doorway and place one arm against the door frame. Your elbow should be a little higher than your shoulder. Relax your shoulders as you lean forward, allowing your chest and shoulder muscles to stretch. You can also turn your body slightly away from your arm to stretch the muscles even more. Hold for 15 to 30 seconds. Repeat 2 to 4 times with each arm. Shoulder and chest stretch  Shoulder and chest stretch  While sitting, relax your upper body so you slump slightly in your chair. As you breathe in, straighten your back and open your arms out to the sides. Gently pull your shoulder blades back and downward. Hold for 15 to 30 seconds as your breathe normally. Repeat 2 to 4 times. Overhead stretch  Reach up over your head with both arms. Hold for 15 to 30 seconds. Repeat 2 to 4 times. Follow-up care is a key part of your treatment and safety. Be sure to make and go to all appointments, and call your doctor if you are having problems. It's also a good idea to know your test results and keep a list of the medicines you take. Current as of: March 9, 2022               Content Version: 13.4  © 2464-4432 Healthwise, Incorporated. Care instructions adapted under license by [employer] (which disclaims liability or warranty for this information). If you have questions about a medical condition or this instruction, always ask your healthcare professional. [address] any warranty or liability for your use of this information.        Rotator Cuff: Exercises  Introduction  Here are some examples of exercises for you to try. The exercises may be suggested for a condition or for rehabilitation. Start each exercise slowly. Ease off the exercises if you start to have pain. You will be told when to start these exercises and which ones will work best for you. How to do the exercises  Pendulum swing  If you have pain in your back, do not do this exercise. Hold on to a table or the back of a chair with your good arm. Then bend forward a little and let your sore arm hang straight down. This exercise does not use the arm muscles. Rather, use your legs and your hips to create movement that makes your arm swing freely. Use the movement from your hips and legs to guide the slightly swinging arm back and forth like a pendulum (or elephant trunk). Then guide it in circles that start small (about the size of a dinner plate). Make the circles a bit larger each day, as your pain allows. Do this exercise for 5 minutes, 5 to 7 times each day. As you have less pain, try bending over a little farther to do this exercise. This will increase the amount of movement at your shoulder. Posterior stretching exercise  Hold the elbow of your injured arm with your other hand. Use your hand to pull your injured arm gently up and across your body. You will feel a gentle stretch across the back of your injured shoulder. Hold for at least 15 to 30 seconds. Then slowly lower your arm. Repeat 2 to 4 times. Up-the-back stretch  Your doctor or physical therapist may want you to wait to do this stretch until you have regained most of your range of motion and strength. You can do this stretch in different ways. Hold any of these stretches for at least 15 to 30 seconds. Repeat them 2 to 4 times. Light stretch: Put your hand in your back pocket. Let it rest there to stretch your shoulder. Moderate stretch:  With your other hand, hold your injured arm (palm outward) behind your back by the wrist. Pull your arm up gently to stretch your shoulder. Advanced stretch: Put a towel over your other shoulder. Put the hand of your injured arm behind your back. Now hold the back end of the towel. With the other hand, hold the front end of the towel in front of your body. Pull gently on the front end of the towel. This will bring your hand farther up your back to stretch your shoulder. Overhead stretch  Standing about an arm's length away, grasp onto a solid surface. You could use a countertop, a doorknob, or the back of a sturdy chair. With your knees slightly bent, bend forward with your arms straight. Lower your upper body, and let your shoulders stretch. As your shoulders are able to stretch farther, you may need to take a step or two backward. Hold for at least 15 to 30 seconds. Then stand up and relax. If you had stepped back during your stretch, step forward so you can keep your hands on the solid surface. Repeat 2 to 4 times. Shoulder flexion (lying down)  To make a wand for this exercise, use a piece of PVC pipe or a broom handle with the broom removed. Make the wand about a foot wider than your shoulders. Lie on your back, holding a wand with both hands. Your palms should face down as you hold the wand. Keeping your elbows straight, slowly raise your arms over your head. Raise them until you feel a stretch in your shoulders, upper back, and chest.  Hold for 15 to 30 seconds. Repeat 2 to 4 times. Shoulder rotation (lying down)  To make a wand for this exercise, use a piece of PVC pipe or a broom handle with the broom removed. Make the wand about a foot wider than your shoulders. Lie on your back. Hold a wand with both hands with your elbows bent and palms up. Keep your elbows close to your body, and move the wand across your body toward the sore arm. Hold for 8 to 12 seconds. Repeat 2 to 4 times. Wall climbing (to the side)  Avoid any movement that is straight to your side, and be careful not to arch your back.  Your arm should stay about 30 degrees to the front of your side. Stand with your side to a wall so that your fingers can just touch it at an angle about 30 degrees toward the front of your body. Walk the fingers of your injured arm up the wall as high as pain permits. Try not to shrug your shoulder up toward your ear as you move your arm up. Hold that position for a count of at least 15 to 20. Walk your fingers back down to the starting position. Repeat at least 2 to 4 times. Try to reach higher each time. Wall climbing (to the front)  During this stretching exercise, be careful not to arch your back. Face a wall, and stand so your fingers can just touch it. Keeping your shoulder down, walk the fingers of your injured arm up the wall as high as pain permits. (Don't shrug your shoulder up toward your ear.)  Hold your arm in that position for at least 15 to 30 seconds. Slowly walk your fingers back down to where you started. Repeat at least 2 to 4 times. Try to reach higher each time. Shoulder blade squeeze  Stand with your arms at your sides, and squeeze your shoulder blades together. Do not raise your shoulders up as you squeeze. Hold 6 seconds. Repeat 8 to 12 times. Scapular exercise: Arm reach  Lie flat on your back. This exercise is a very slight motion that starts with your arms raised (elbows straight, arms straight). From this position, reach higher toward the isadora or ceiling. Keep your elbows straight. All motion should be from your shoulder blade only. Relax your arms back to where you started. Repeat 8 to 12 times. Arm raise to the side  During this strengthening exercise, your arm should stay about 30 degrees to the front of your side. Slowly raise your injured arm to the side, with your thumb facing up. Raise your arm 60 degrees at the most (shoulder level is 90 degrees). Hold the position for 3 to 5 seconds. Then lower your arm back to your side.  If you need to, bring your \"good\" arm across your body and place it under the elbow as you lower your injured arm. Use your good arm to keep your injured arm from dropping down too fast.  Repeat 8 to 12 times. When you first start out, don't hold any extra weight in your hand. As you get stronger, you may use a 1-pound to 2-pound dumbbell or a small can of food. Shoulder flexor and extensor exercise  These are isometric exercises. That means you contract your muscles without actually moving. Push forward (flex): Stand facing a wall or doorjamb, about 6 inches or less back. Hold your injured arm against your body. Make a closed fist with your thumb on top. Then gently push your hand forward into the wall with about 25% to 50% of your strength. Don't let your body move backward as you push. Hold for about 6 seconds. Relax for a few seconds. Repeat 8 to 12 times. Push backward (extend): Stand with your back flat against a wall. Your upper arm should be against the wall, with your elbow bent 90 degrees (your hand straight ahead). Push your elbow gently back against the wall with about 25% to 50% of your strength. Don't let your body move forward as you push. Hold for about 6 seconds. Relax for a few seconds. Repeat 8 to 12 times. Scapular exercise: Wall push-ups  This exercise is best done with your fingers somewhat turned out, rather than straight up and down. Stand facing a wall, about 12 inches to 18 inches away. Place your hands on the wall at shoulder height. Slowly bend your elbows and bring your face to the wall. Keep your back and hips straight. Push back to where you started. Repeat 8 to 12 times. When you can do this exercise against a wall comfortably, you can try it against a counter. You can then slowly progress to the end of a couch, then to a sturdy chair, and finally to the floor. Scapular exercise: Retraction  For this exercise, you will need elastic exercise material, such as surgical tubing or Thera-Band.   Put the band around a solid object at about waist level. (A bedpost will work well.) Each hand should hold an end of the band. With your elbows at your sides and bent to 90 degrees, pull the band back. Your shoulder blades should move toward each other. Then move your arms back where you started. Repeat 8 to 12 times. If you have good range of motion in your shoulders, try this exercise with your arms lifted out to the sides. Keep your elbows at a 90-degree angle. Raise the elastic band up to about shoulder level. Pull the band back to move your shoulder blades toward each other. Then move your arms back where you started. Internal rotator strengthening exercise  Start by tying a piece of elastic exercise material to a doorknob. You can use surgical tubing or Thera-Band. Stand or sit with your shoulder relaxed and your elbow bent 90 degrees. Your upper arm should rest comfortably against your side. Squeeze a rolled towel between your elbow and your body for comfort. This will help keep your arm at your side. Hold one end of the elastic band in the hand of the painful arm. Slowly rotate your forearm toward your body until it touches your belly. Slowly move it back to where you started. Keep your elbow and upper arm firmly tucked against the towel roll or at your side. Repeat 8 to 12 times. External rotator strengthening exercise  Start by tying a piece of elastic exercise material to a doorknob. You can use surgical tubing or Thera-Band. (You may also hold one end of the band in each hand.)  Stand or sit with your shoulder relaxed and your elbow bent 90 degrees. Your upper arm should rest comfortably against your side. Squeeze a rolled towel between your elbow and your body for comfort. This will help keep your arm at your side. Hold one end of the elastic band with the hand of the painful arm. Start with your forearm across your belly. Slowly rotate the forearm out away from your body.  Keep your elbow and upper arm tucked against the towel roll or the side of your body until you begin to feel tightness in your shoulder. Slowly move your arm back to where you started. Repeat 8 to 12 times. Follow-up care is a key part of your treatment and safety. Be sure to make and go to all appointments, and call your doctor if you are having problems. It's also a good idea to know your test results and keep a list of the medicines you take. Current as of: March 9, 2022               Content Version: 13.4  © 2006-2022 Healthwise, [employer]. Care instructions adapted under license by [employer] (which disclaims liability or warranty for this information). If you have questions about a medical condition or this instruction, always ask your healthcare professional. [address] any warranty or liability for your use of this information.

## 2022-12-01 NOTE — LETTER
12/1/2022    Patient: Yusuf De Los Santos   YOB: 1960   Date of Visit: 12/1/2022     Ophelia Florian NP  [facility] 50 [id number]  Via In Basket    Dear Ophelia Florian NP,      Thank you for referring Mr. Dolores Rosario to [facility] for evaluation. My notes for this consultation are attached. If you have questions, please do not hesitate to call me. I look forward to following your patient along with you.       Sincerely,    Kathleen Crain, DO

## 2023-01-05 DIAGNOSIS — E78.1 HYPERTRIGLYCERIDEMIA: ICD-10-CM

## 2023-01-06 RX ORDER — ICOSAPENT ETHYL 1000 MG/1
CAPSULE ORAL
Qty: 360 CAPSULE | Refills: 3 | Status: SHIPPED | OUTPATIENT
Start: 2023-01-06

## 2023-01-18 RX ORDER — OMEPRAZOLE 20 MG/1
20 CAPSULE, DELAYED RELEASE ORAL DAILY
Qty: 90 CAPSULE | Refills: 3 | Status: SHIPPED | OUTPATIENT
Start: 2023-01-18

## 2023-01-31 DIAGNOSIS — E79.0 HYPERURICEMIA: Primary | ICD-10-CM

## 2023-01-31 DIAGNOSIS — D72.829 LEUKOCYTOSIS, UNSPECIFIED TYPE: ICD-10-CM

## 2023-01-31 DIAGNOSIS — I10 ESSENTIAL HYPERTENSION: ICD-10-CM

## 2023-01-31 DIAGNOSIS — R73.03 PREDIABETES: ICD-10-CM

## 2023-01-31 DIAGNOSIS — E78.1 HYPERTRIGLYCERIDEMIA: ICD-10-CM

## 2023-02-03 DIAGNOSIS — E79.0 HYPERURICEMIA: ICD-10-CM

## 2023-02-03 DIAGNOSIS — I10 ESSENTIAL HYPERTENSION: ICD-10-CM

## 2023-02-03 DIAGNOSIS — E78.1 HYPERTRIGLYCERIDEMIA: ICD-10-CM

## 2023-02-03 DIAGNOSIS — R73.03 PREDIABETES: ICD-10-CM

## 2023-02-03 DIAGNOSIS — D72.829 LEUKOCYTOSIS, UNSPECIFIED TYPE: ICD-10-CM

## 2023-02-03 LAB
ALBUMIN SERPL-MCNC: 3.7 G/DL (ref 3.5–5)
ALBUMIN/GLOB SERPL: 1.2 (ref 1.1–2.2)
ALP SERPL-CCNC: 46 U/L (ref 45–117)
ALT SERPL-CCNC: <6 U/L (ref 12–78)
ANION GAP SERPL CALC-SCNC: 6 MMOL/L (ref 5–15)
AST SERPL-CCNC: 29 U/L (ref 15–37)
BASOPHILS # BLD: 0 K/UL (ref 0–0.1)
BASOPHILS NFR BLD: 0 % (ref 0–1)
BILIRUB SERPL-MCNC: 0.3 MG/DL (ref 0.2–1)
BUN SERPL-MCNC: 34 MG/DL (ref 6–20)
BUN/CREAT SERPL: 39 (ref 12–20)
CALCIUM SERPL-MCNC: 9.7 MG/DL (ref 8.5–10.1)
CHLORIDE SERPL-SCNC: 103 MMOL/L (ref 97–108)
CHOLEST SERPL-MCNC: 198 MG/DL
CO2 SERPL-SCNC: 27 MMOL/L (ref 21–32)
COMMENT, HOLDF: NORMAL
CREAT SERPL-MCNC: 0.87 MG/DL (ref 0.7–1.3)
DIFFERENTIAL METHOD BLD: ABNORMAL
EOSINOPHIL # BLD: 0 K/UL (ref 0–0.4)
EOSINOPHIL NFR BLD: 1 % (ref 0–7)
ERYTHROCYTE [DISTWIDTH] IN BLOOD BY AUTOMATED COUNT: 14.1 % (ref 11.5–14.5)
EST. AVERAGE GLUCOSE BLD GHB EST-MCNC: 154 MG/DL
GLOBULIN SER CALC-MCNC: 3 G/DL (ref 2–4)
GLUCOSE SERPL-MCNC: 174 MG/DL (ref 65–100)
HBA1C MFR BLD: 7 % (ref 4–5.6)
HCT VFR BLD AUTO: 32.5 % (ref 36.6–50.3)
HDLC SERPL-MCNC: 23 MG/DL
HDLC SERPL: 8.6 (ref 0–5)
HGB BLD-MCNC: 11 G/DL (ref 12.1–17)
IMM GRANULOCYTES # BLD AUTO: 0 K/UL (ref 0–0.04)
IMM GRANULOCYTES NFR BLD AUTO: 1 % (ref 0–0.5)
LDLC SERPL CALC-MCNC: ABNORMAL MG/DL (ref 0–100)
LDLC SERPL DIRECT ASSAY-MCNC: 49 MG/DL (ref 0–100)
LYMPHOCYTES # BLD: 3 K/UL (ref 0.8–3.5)
LYMPHOCYTES NFR BLD: 57 % (ref 12–49)
MCH RBC QN AUTO: 35.5 PG (ref 26–34)
MCHC RBC AUTO-ENTMCNC: 33.8 G/DL (ref 30–36.5)
MCV RBC AUTO: 104.8 FL (ref 80–99)
MONOCYTES # BLD: 0.5 K/UL (ref 0–1)
MONOCYTES NFR BLD: 10 % (ref 5–13)
NEUTS SEG # BLD: 1.7 K/UL (ref 1.8–8)
NEUTS SEG NFR BLD: 31 % (ref 32–75)
NRBC # BLD: 0 K/UL (ref 0–0.01)
NRBC BLD-RTO: 0 PER 100 WBC
PLATELET # BLD AUTO: 152 K/UL (ref 150–400)
PMV BLD AUTO: 11.5 FL (ref 8.9–12.9)
POTASSIUM SERPL-SCNC: 4 MMOL/L (ref 3.5–5.1)
PROT SERPL-MCNC: 6.7 G/DL (ref 6.4–8.2)
RBC # BLD AUTO: 3.1 M/UL (ref 4.1–5.7)
SAMPLES BEING HELD,HOLD: NORMAL
SODIUM SERPL-SCNC: 136 MMOL/L (ref 136–145)
TRIGL SERPL-MCNC: 1745 MG/DL (ref ?–150)
URATE SERPL-MCNC: 5.8 MG/DL (ref 3.5–7.2)
VLDLC SERPL CALC-MCNC: ABNORMAL MG/DL
WBC # BLD AUTO: 5.3 K/UL (ref 4.1–11.1)

## 2023-02-10 ENCOUNTER — OFFICE VISIT (OUTPATIENT)
Dept: FAMILY MEDICINE CLINIC | Age: 63
End: 2023-02-10
Payer: COMMERCIAL

## 2023-02-10 VITALS
OXYGEN SATURATION: 98 % | BODY MASS INDEX: 31.9 KG/M2 | SYSTOLIC BLOOD PRESSURE: 132 MMHG | RESPIRATION RATE: 16 BRPM | DIASTOLIC BLOOD PRESSURE: 78 MMHG | WEIGHT: 215.4 LBS | HEIGHT: 69 IN | HEART RATE: 74 BPM | TEMPERATURE: 98.5 F

## 2023-02-10 DIAGNOSIS — G47.33 OSA (OBSTRUCTIVE SLEEP APNEA): ICD-10-CM

## 2023-02-10 DIAGNOSIS — I10 ESSENTIAL HYPERTENSION: ICD-10-CM

## 2023-02-10 DIAGNOSIS — E11.65 TYPE 2 DIABETES MELLITUS WITH HYPERGLYCEMIA, WITHOUT LONG-TERM CURRENT USE OF INSULIN (HCC): Primary | ICD-10-CM

## 2023-02-10 DIAGNOSIS — E78.1 HYPERTRIGLYCERIDEMIA: ICD-10-CM

## 2023-02-10 DIAGNOSIS — R93.1 ELEVATED CORONARY ARTERY CALCIUM SCORE: ICD-10-CM

## 2023-02-10 RX ORDER — FENOFIBRATE 145 MG/1
145 TABLET, COATED ORAL DAILY
Qty: 90 TABLET | Refills: 1 | Status: SHIPPED | OUTPATIENT
Start: 2023-02-10

## 2023-02-10 RX ORDER — VALSARTAN 320 MG/1
320 TABLET ORAL DAILY
Qty: 90 TABLET | Refills: 3 | Status: SHIPPED | OUTPATIENT
Start: 2023-02-10

## 2023-02-10 NOTE — PROGRESS NOTES
[facility] Note     Jeffrey Farris (: 1960) is a 58 y.o. male, established patient, here for evaluation of the following chief complaint(s):  Cholesterol Problem, Hypertension, Pre-diabetes, and Results       ASSESSMENT/PLAN:  1. Type 2 diabetes mellitus with hyperglycemia, without long-term current use of insulin ([facility])  - A1c now up to 7.0  - Mr. Ramiro Candelario has not been hearing to any diet or exercise regimen over the last several months. Lab Results   Component Value Date/Time    Hemoglobin A1c 7.0 (H) 2023 07:16 AM       2. Elevated coronary artery calcium score  -     REFERRAL TO CARDIOLOGY  -     CT HEART W/O CONT WITH CALCIUM; Future  Last result reviewed from 2017: Your score of 332 places you in the 80th percentile rank. That means that 20% of the males at the ages from 61-59 have a higher calcium scoring then you. The 10-year ASCVD risk score (Ramón VALENZUELA, et al., 2019) is: 20.1%    3. Hypertriglyceridemia  - START    fenofibrate nanocrystallized (TRICOR) 145 mg tablet; Take 1 Tablet by mouth daily. Indications: high amount of triglyceride in the blood, Normal, Disp-90 Tablet, R-1  -Discussed aggressive dietary changes as his triglyceride level was greater than 1700  -     REFERRAL TO CARDIOLOGY  -Continue Vascepa and Crestor  -We discussed risk and signs and symptoms of pancreatitis  - We discussedTriglycerides generally reflect high consumption one or more of the following: tobacco, alcohol, simple carbohydrates such as sugar or white flour, trans fats and saturated fats. Add Physical activity - Recommendations include regular aerobic activity (at least 150 minutes/week of moderate-intensity activity, 75 minutes/week of vigorous intensity activity, or an equivalent combination of both. Weight loss - Weight loss of 5 to 10 percent body weight is generally recommended for patients with elevated triglycerides    4.  NEETU (obstructive sleep apnea)  -     SLEEP MEDICINE REFERRAL    5. Essential hypertension  -     valsartan (DIOVAN) 320 mg tablet; Take 1 Tablet by mouth daily. , Normal, Disp-90 Tablet, R-3Requesting 1 year supply      Return in about 3 months (around 5/10/2023). SUBJECTIVE/OBJECTIVE:    Jazzy Gan is a 58 y.o. male seen today for HTN, HLD and prediabetes    Cardiovascular Review:  He has hypertension, hyperlipidemia, obesity, and prediabetes. Diet and Lifestyle: not attempting to follow a low fat, low cholesterol diet, not attempting to follow a low sodium diet, does not rigorously follow a diabetic diet, sedentary  Home BP Monitoring: is not measured at home. Pertinent ROS: taking medications as instructed, no medication side effects noted, no TIA's, no chest pain on exertion, no dyspnea on exertion, no swelling of ankles. Diabetes Mellitus / Prediabetes:  Diabetic ROS - medication compliance: NOT ON MEDICATION, diabetic diet compliance: noncompliant much of the time, home glucose monitoring: is not performed. Further diabetic ROS: no polyuria or polydipsia, no chest pain, dyspnea or TIA's, no numbness, tingling or pain in extremities. REVIEW OF SYSTEMS:    Review of Systems   All other systems reviewed and are negative. VITAL SIGNS:    Wt Readings from Last 3 Encounters:   02/10/23 215 lb 6.4 oz (97.7 kg)   12/01/22 212 lb (96.2 kg)   08/02/22 212 lb (96.2 kg)     Temp Readings from Last 3 Encounters:   02/10/23 98.5 °F (36.9 °C) (Oral)   08/02/22 98.1 °F (36.7 °C) (Oral)   07/11/22 97.5 °F (36.4 °C) (Temporal)     BP Readings from Last 3 Encounters:   02/10/23 132/78   08/02/22 134/77   07/11/22 139/80     Pulse Readings from Last 3 Encounters:   02/10/23 74   08/02/22 71   07/11/22 83           PHYSICAL EXAMINATION:       General: Alert, cooperative, no distress  Respiratory: Breathing comfortably, in no acute respiratory distress. Clear to auscultation bilaterally.    Cardiovascular: Regular rate and rhythm, S1, S2 normal, no murmur, click, rub or gallop. Extremities: no edema. Abdomen: Soft, non-tender, not distended. Bowel sounds normal. No masses or organomegaly. MSK: Extremities normal appearing, atraumatic, no effusion. Gait steady and unassisted. Skin: Skin color, texture, turgor normal. No rashes or lesions on exposed skin. Neurologic: A/Ox3  Psychiatric: Normal affect. Mood euthymic. Thoughts logical. Speech volume and speed normal            Treatment risks/benefits/costs/interactions/alternatives discussed with patient. Advised patient to call back or return to office if symptoms worsen/change/persist. If patient cannot reach us or should anything more severe/urgent arise he/she should proceed directly to the nearest emergency department. Discussed expected course/resolution/complications of diagnosis in detail with patient. Patient expressed understanding with the diagnosis and plan. An electronic signature was used to authenticate this note.   -- Adina Esquivel NP

## 2023-02-10 NOTE — PROGRESS NOTES
Chief Complaint   Patient presents with    Cholesterol Problem    Hypertension    Pre-diabetes    Results   1. \"Have you been to the ER, urgent care clinic since your last visit? Hospitalized since your last visit? \" Yes [facility] Dec for a cold    2. \"Have you seen or consulted any other health care providers outside of the [address] [place] [address] since your last visit? \" Surgeon Dr Kaleigh Mcnair    3. For patients aged 39-70: Has the patient had a colonoscopy / FIT/ Cologuard? Yes - no Care Gap present 2013 due this year doesn't remember who did it      If the patient is female:    4. For patients aged 41-77: Has the patient had a mammogram within the past 2 years? NA - based on age or sex      11. For patients aged 21-65: Has the patient had a pap smear?  NA - based on age or sex

## 2023-02-13 ENCOUNTER — OFFICE VISIT (OUTPATIENT)
Dept: FAMILY MEDICINE CLINIC | Age: 63
End: 2023-02-13

## 2023-02-13 VITALS
WEIGHT: 217.8 LBS | SYSTOLIC BLOOD PRESSURE: 125 MMHG | OXYGEN SATURATION: 95 % | TEMPERATURE: 97.5 F | DIASTOLIC BLOOD PRESSURE: 73 MMHG | RESPIRATION RATE: 16 BRPM | BODY MASS INDEX: 32.26 KG/M2 | HEIGHT: 69 IN | HEART RATE: 77 BPM

## 2023-02-13 DIAGNOSIS — M79.601 PAIN OF RIGHT UPPER EXTREMITY: ICD-10-CM

## 2023-02-13 DIAGNOSIS — M54.2 NECK PAIN: ICD-10-CM
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
## 2023-02-13 DIAGNOSIS — Z09 ENCOUNTER FOR EXAMINATION FOLLOWING TREATMENT AT HOSPITAL: Primary | ICD-10-CM

## 2023-02-13 PROCEDURE — 3078F DIAST BP <80 MM HG: CPT | Performed by: NURSE PRACTITIONER

## 2023-02-13 PROCEDURE — 99214 OFFICE O/P EST MOD 30 MIN: CPT | Performed by: NURSE PRACTITIONER

## 2023-02-13 PROCEDURE — 3074F SYST BP LT 130 MM HG: CPT | Performed by: NURSE PRACTITIONER

## 2023-02-13 RX ORDER — CYCLOBENZAPRINE HCL 10 MG
10 TABLET ORAL
Qty: 30 TABLET | Refills: 1 | Status: SHIPPED | OUTPATIENT
Start: 2023-02-13

## 2023-02-13 NOTE — PROGRESS NOTES
[facility] Note     Selwyn Becerra (: 1960) is a 58 y.o. male, established patient, here for evaluation of the following chief complaint(s):  Fall (23 tripped over umbrella stand, fell on face, lost consciousness) and Arm Pain (Right)       ASSESSMENT/PLAN:  1. Encounter for examination following treatment at [facility] records to include [address], [address] and labs    2. Neck pain  -     XR SPINE CERV 4 OR 5 V; Future  -     REFERRAL TO PHYSICAL THERAPY -agrees with physical therapy. Advised Mr. Mona Sidhu to wait at least a week to allow some healing and inflammation to reduce prior to engaging in physical therapy as it will exacerbate his pain.  -Has prescription for diclofenac. Encouraged use of diclofenac. May choose use of ibuprofen or diclofenac which ever relieves his pain better. Recommended scheduled Tylenol, heat as needed and gentle range of motion.  -Counseling provided when to seek emergent care    3. Fall from slip, trip, or stumble, initial encounter  -     XR SPINE CERV 4 OR 5 V; Future  -     XR HUMERUS RT; Future  -     REFERRAL TO PHYSICAL THERAPY    4. Pain of right upper extremity  -     XR HUMERUS RT; Future  -     REFERRAL TO PHYSICAL THERAPY          Return if symptoms worsen or fail to improve. SUBJECTIVE/OBJECTIVE:    Selwyn Becerra is a 58 y.o. male seen today for ED follow up s/p fall. Mr. Mona Sidhu was visiting in [address] [place] [address] when he tripped over an umbrella stand, fell and had a period of loss of consciousness. He was taken to [facility] emergency room trauma bay in which x-ray and CT imaging were obtained. Cervical spine did show multilevel cervical spondylosis otherwise no acute/traumatic injury. There was also findings of right periorbital bowl soft tissue swelling and zygomatic soft tissue swelling and right facial soft tissue tissue swelling/hematoma. No visible fracture.       Mr. Mona Sidhu describes falling on his right side where he sustained injuries to the right side of his face, right upper arm, and right knee. Mr. Eddie Isaac has had a history of right upper extremity humeral surgery with plate. Wishes to have imaging done to assure the site is uninjured. REVIEW OF SYSTEMS:    Review of Systems   Constitutional: Negative. HENT:  Negative for trouble swallowing. Right facial bruising and swelling   Respiratory: Negative. Cardiovascular: Negative. Gastrointestinal: Negative. Genitourinary: Negative. Musculoskeletal:  Positive for neck pain and neck stiffness. Right upper arm pain   Neurological: Negative. VITAL SIGNS:    Wt Readings from Last 3 Encounters:   02/13/23 217 lb 12.8 oz (98.8 kg)   02/10/23 215 lb 6.4 oz (97.7 kg)   12/01/22 212 lb (96.2 kg)     Temp Readings from Last 3 Encounters:   02/13/23 97.5 °F (36.4 °C) (Temporal)   02/10/23 98.5 °F (36.9 °C) (Oral)   08/02/22 98.1 °F (36.7 °C) (Oral)     BP Readings from Last 3 Encounters:   02/13/23 125/73   02/10/23 132/78   08/02/22 134/77     Pulse Readings from Last 3 Encounters:   02/13/23 77   02/10/23 74   08/02/22 71           PHYSICAL EXAMINATION:       General: Alert, cooperative, no distress  HEENT: Right facial abrasion, edema and ecchymosis along the right side of face and submandibular area. Right eye conjunctiva hematoma present  Respiratory: Breathing comfortably, in no acute respiratory distress. Clear to auscultation bilaterally. Cardiovascular: Regular rate and rhythm, S1, S2 normal, no murmur, click, rub or gallop. Extremities: no edema. Abdomen: Soft, non-tender, not distended. Bowel sounds normal. No masses or organomegaly. MSK: Limited range of motion to neck due to pain, limited range of motion to right arm due to pain no visible deformity. Gait steady and unassisted.    Skin: Right-sided facial ecchymosis with abrasion, scant ecchymosis to proximal humeral area in the right arm, right knee abrasion  Neurologic: A/Ox3, no focal deficits, CN II through XII intact  Psychiatric: Normal affect. Mood euthymic. Thoughts logical. Speech volume and speed normal            Treatment risks/benefits/costs/interactions/alternatives discussed with patient. Advised patient to call back or return to office if symptoms worsen/change/persist. If patient cannot reach us or should anything more severe/urgent arise he/she should proceed directly to the nearest emergency department. Discussed expected course/resolution/complications of diagnosis in detail with patient. Patient expressed understanding with the diagnosis and plan. An electronic signature was used to authenticate this note.   -- Valerie Arnold NP

## 2023-02-13 NOTE — LETTER
NOTIFICATION RETURN TO WORK / SCHOOL    2/13/2023 12:10 PM    Mr. Adele Buckley [address] [place] [id number]      To Whom It May Concern:    Angel Garzon is currently under the care of MARTIN Apodaca. He will return to work/school on: 2/20/2023    If there are questions or concerns please have the patient contact our office.         Sincerely,              Mya Watson NP

## 2023-02-13 NOTE — PROGRESS NOTES
Chief Complaint   Patient presents with    Fall     2/11/23 tripped over umbrella stand, fell on face, lost consciousness    Arm Pain     Right         1. \"Have you been to the ER, urgent care clinic since your last visit? Hospitalized since your last visit? \" Yes When: 2/11/23 Where: [address] [facility] Reason for visit: fall    2. \"Have you seen or consulted any other health care providers outside of the [address] [place] [address] [facility] since your last visit? \" No     3. For patients over 45: Has the patient had a colonoscopy?  Yes - no Care Gap present       3 most recent PHQ Screens 2/10/2023   Little interest or pleasure in doing things Not at all   Feeling down, depressed, irritable, or hopeless Not at all   Total Score PHQ 2 0   Trouble falling or staying asleep, or sleeping too much -   Feeling tired or having little energy -   Poor appetite, weight loss, or overeating -   Feeling bad about yourself - or that you are a failure or have let yourself or your family down -   Trouble concentrating on things such as school, work, reading, or watching TV -   Moving or speaking so slowly that other people could have noticed; or the opposite being so fidgety that others notice -   Thoughts of being better off dead, or hurting yourself in some way -   PHQ 9 Score -   How difficult have these problems made it for you to do your work, take care of your home and get along with others -       Health Maintenance Due   Topic Date Due    Pneumococcal 0-64 years (1 - PCV) Never done

## 2023-02-15 DIAGNOSIS — I10 ESSENTIAL HYPERTENSION: ICD-10-CM

## 2023-02-15 DIAGNOSIS — E78.5 DYSLIPIDEMIA: ICD-10-CM

## 2023-02-16 RX ORDER — VALSARTAN 320 MG/1
320 TABLET ORAL DAILY
Qty: 90 TABLET | Refills: 3 | Status: SHIPPED | OUTPATIENT
Start: 2023-02-16

## 2023-02-16 RX ORDER — ROSUVASTATIN CALCIUM 40 MG/1
40 TABLET, COATED ORAL DAILY
Qty: 90 TABLET | Refills: 3 | Status: SHIPPED | OUTPATIENT
Start: 2023-02-16

## 2023-02-21 ENCOUNTER — HOSPITAL ENCOUNTER (OUTPATIENT)
Dept: PHYSICAL THERAPY | Age: 63
Discharge: HOME | End: 2023-02-21
Payer: COMMERCIAL

## 2023-02-21 DIAGNOSIS — M54.2 NECK PAIN: Primary | ICD-10-CM

## 2023-02-21 DIAGNOSIS — M79.601 PAIN OF RIGHT UPPER EXTREMITY: ICD-10-CM
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
## 2023-02-21 PROCEDURE — 97140 MANUAL THERAPY 1/> REGIONS: CPT | Performed by: PHYSICAL THERAPIST

## 2023-02-21 PROCEDURE — 97161 PT EVAL LOW COMPLEX 20 MIN: CPT | Performed by: PHYSICAL THERAPIST

## 2023-02-21 NOTE — PROGRESS NOTES
PT INITIAL EVALUATION NOTE - [facility] 2-15    Patient Name: August Esquivel  Date:2023  : 1960  [x]  Patient  Verified  Payor: Alexei Hammond / Plan: Sabrina Eagle 77 HMO / Product Type: HMO /    In time:940 A  Out time:1035 A  Total Treatment Time (min): 55 (35 eval, 10 timed, 10 modality see below)  Total Timed Codes (min): 10   1:1 Treatment Time ([facility]/[place]): 10    Visit #:1    Treatment Area: Neck pain [M54.2]  Fall from slip, trip, or stumble, initial encounter [W01. 0XXA]  Pain of right upper extremity [M79.601]    SUBJECTIVE  Any medication changes, allergies to medications, adverse drug reactions, diagnosis change, or new procedure performed?: [] No    [x] Yes (see summary sheet for update)    Chief complaint: Stepped into pot while at coffee shop in [facility], fell forward and landed on shoulder and head  Went to ER  Did x-rays at [facility] and also another set here in [place]. Location of pain: monique neck pain, radiates to UT  Description of pain: dull  Pain:   9/10 max 3/10 min 5/10 now     Aggravated by: worse in the AM, holding head up  Eased by: tylenol, ibuprofen  Headaches:     [] Yes   [x] No  Dizziness:     [] Yes    [x] No  Jaw Pain:    [] Yes    [x] No  UE tingling/numbness:   [] Yes   [x] No  UE weakness:    [] Yes    [x] No   Blurred Vision/Double Vision [] Yes    [x] No   Previous treatment: for monique shoulder issue in the past  Tests/injections:  IMPRESSION  No acute abnormality. Multilevel degenerative changes with  bilateral neural foraminal narrowing. IMPRESSION  No acute abnormality. 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   PCP Took him out of work for one week. Social: Lives with partner. Has three dogs and 2 birds. OBJECTIVE    Observation:bruising noted on left forearm-pt reporting it is not from fall but from draw of blood  Increased lower cervical flexion, Increased upper cervical ext [x]   Kyphosis  [x] Inc    [] Dec    AROM cervical spine (Degrees)   Flexion 50   Extension 25 p! R Sidebending 20   L Sidebending 20   R Rotation 55 p! L Rotation 55 p! PROM cervical spine end range limitation and pain monique rot    UE AROM: monique shoulders AROM WNL and pain free     Myotomal Testing: cervical myotomes 5/5 monique    MMT:4-/5 rhomboids, middle trap, serratus anterior, external rotators. Tenderness to palpation: monique R>L SCM, UT, levator    Special Tests:         Vertebral Artery:   [] R    [] L    [] +    [x] -         Alar Ligament:  [] R    [] L    [] +    [x] -       Transverse Ligament: [] R    [] L    [] +    [x] -       Spurling's:   [] R    [] L    [] +    [x] -       Distraction:   [] R    [] L    [] +    [x] -       Compression:  [] R    [] L    [] +    [x] -    Joint mobility:  hypomobility c2-c7 and t1-t5, pain with testing all segments    OBJECTIVE  [x] Skin assessment post-treatment:  [x]intact []redness- no adverse reaction    []redness - adverse reaction:     10 min Manual Therapy: STM monique UT, levator, SCM. Gentle cervical distraction. Rationale: decrease pain, increase ROM, increase tissue extensibility and decrease trigger points to improve the patients ability to turn head, sit for prolonged periods of time. 10 min [x]  Ice     []  Heat  Position:supine, monique LE's supported  Location: cervical spine   Rationale: decrease edema, decrease inflammation, decrease pain and reduce soreness post therapy in order to improve the patients ability to perform ADL's.            With   [] TE   [x] manual   [] self care    Patient Education: [x] Review HEP    [] Progressed/Changed HEP based on:   [] positioning   [] body mechanics [] transfers   [x] Ice application- pt advised to ice 10-15 min 1-2 x/day to area in order to dec inflammation  [x] other:  re: mechanism of injury/condition, role of physical therapy, prognosis for recovery, heat vs ice, activity modifications. Pain Level (0-10 scale) post treatment: 4    ASSESSMENT/Changes in Function:     [x]  See Plan of Krystle.  Liam PT, KATHIT, CMTPT  PT License Number: [id number]   2/21/2023  9:51 AM

## 2023-02-21 NOTE — PROGRESS NOTES
[facility] Physical [facility]  [address]  [place], [address]  Phone: [phone]  Fax: [phone]    Plan of Care/Statement of Necessity for Physical Therapy Services  2-15    Patient name: Medardo Whitlock  : 1960  Provider#: [id number]  Referral source: Doron Frank NP      Medical/Treatment Diagnosis: Neck pain [M54.2]  Fall from slip, trip, or stumble, initial encounter [W01. 0XXA]  Pain of right upper extremity [M79.601]     Prior Hospitalization: see medical history     Comorbidities: see evaluation  Prior Level of Function:see evaluation  Medications: Verified on Patient Summary List  Start of Care: 2023     Onset Date:see evaluation     The Plan of Care and following information is based on the information from the initial evaluation.     Assessment/ key information: Patient presents with signs and symptoms consistent with cervical strain post fall and will benefit from physical therapy to address deficits noted below in problem list.   Evaluation Complexity History LOW Complexity : Zero comorbidities / personal factors that will impact the outcome / POC; Examination LOW Complexity : 1-2 Standardized tests and measures addressing body structure, function, activity limitation and / or participation in recreation  ;Presentation LOW Complexity : Stable, uncomplicated  ;Clinical Decision Making Other outcome measures clinical judgment  LOW   Overall Complexity Rating: LOW   Problem List: pain affecting function, decrease ROM, decrease strength, decrease activity tolerance, and decrease flexibility/ joint mobility   Treatment Plan may include any combination of the following: Therapeutic exercise, Therapeutic activities, Neuromuscular re-education, Physical agent/modality, Manual therapy, Patient education and Self Care training  Patient / Family readiness to learn indicated by: asking questions, trying to perform skills and interest  Persons(s) to be included in education: patient (P)  Barriers to Learning/Limitations: None  Patient Goal (s): please see evaluation in [facility]  Patient Self Reported Health Status: please see paper chart  Rehabilitation Potential: excellent    Short Term Goals: To be accomplished in 5 treatments:  -Independent in HEP as evidenced on ability to perform at least 5 exercises from HEP using proper form without verbal cuing.   -Pain less than or equal to 6/10 at worst to allow patient to perform ADL's with greater ease  -Demostrate proper posture in order to decrease cervical pain  -Pt will report compliance with icing 1-2x/day in order to decrease inflammation    Long Term Goals: To be accomplished in 3 months:  -AROM cervical spine full and pain free all planes to allow pt to drive with greater ease  -Pain 0/10 to allow patient to participate in a full day of working at a computer without pain    Frequency / Duration: Patient to be seen 2 times per week for 3 months. Patient/ Caregiver education and instruction: self care, activity modification and exercises    [x]  Plan of care has been reviewed with PTA    Certification Period: 2/21/2023 -  5/21/23    Carl Wheeler, PT, DPT, CMTPT      5/04/8530 75:46 AM  PT License Number: [id number]  _____________________________________________________________________    I certify that the above Therapy Services are being furnished while the patient is under my care. I agree with the treatment plan and certify that this therapy is necessary.     [de-identified] Signature:____________________  Date:____________Time:_________

## 2023-03-06 ENCOUNTER — HOSPITAL ENCOUNTER (OUTPATIENT)
Dept: CT IMAGING | Age: 63
Discharge: HOME | End: 2023-03-06
Attending: NURSE PRACTITIONER
Payer: SELF-PAY

## 2023-03-06 DIAGNOSIS — R93.1 ELEVATED CORONARY ARTERY CALCIUM SCORE: ICD-10-CM

## 2023-03-06 PROCEDURE — 75571 CT HRT W/O DYE W/CA TEST: CPT

## 2023-03-07 ENCOUNTER — HOSPITAL ENCOUNTER (OUTPATIENT)
Dept: PHYSICAL THERAPY | Age: 63
Discharge: HOME | End: 2023-03-07
Payer: COMMERCIAL

## 2023-03-07 PROCEDURE — 97140 MANUAL THERAPY 1/> REGIONS: CPT | Performed by: PHYSICAL THERAPIST

## 2023-03-07 PROCEDURE — 97110 THERAPEUTIC EXERCISES: CPT | Performed by: PHYSICAL THERAPIST

## 2023-03-07 NOTE — PROGRESS NOTES
PT DAILY TREATMENT NOTE - [facility] 2-15    Patient Name: Thea Coffman  Date:3/7/2023  : 1960  [x]  Patient  Verified  Payor: Kinsey Lino / Plan: Sabrina Eagle 77 HMO / Product Type: HMO /    In time:1130 A  Out time:1225 P  Total Treatment Time (min): 55  Total Timed Codes (min): 45  1:1 Treatment Time ([facility]/[place] only): 45   Visit #:  2    Treatment Area: Neck pain [M54.2]  Pain in right arm [M79.601]    SUBJECTIVE  Pain Level (0-10 scale): 4  Any medication changes, allergies to medications, adverse drug reactions, diagnosis change, or new procedure performed?: [x] No    [] Yes (see summary sheet for update)  Subjective functional status/changes:     Having some tingling in left arm. Shoulder has been bothering him some. Ice has been helping. OBJECTIVE    Modality rationale: decrease inflammation to improve the patients ability to perform ADL's. Min Type Additional Details   10 []  Ice     [x]  Heat pre Position:supine, monique LE's supported    Location:cervical spine     [x] Skin assessment post-treatment:  [x]intact []redness- no adverse reaction    []redness - adverse reaction:     30 min Therapeutic Exercise:  [x] See flow sheet :   Rationale: increase strength, improve coordination, improve balance, and increase proprioception to improve the patients ability to perform ADL's    15 min Manual Therapy: SL scap mobs into downward rotation and retraction. MFR left infraspinatus, teres major, teres minor. Cervical distraction. MFR monique UT, levator.       Rationale: decrease pain, increase tissue extensibility, and decrease trigger points to improve the patients ability to perform ADL's          With   [x] TE   [] manual Patient Education: [x] Review HEP    [] Progressed/Changed HEP based on:   [] positioning   [] body mechanics   [] transfers   [] heat/ice application    [x] other: emailed HEP     Other Objective/Functional Measures:   Right cervical shift    Pain Level (0-10 scale) post treatment: 1    ASSESSMENT/Changes in Function:     Patient will continue to benefit from skilled PT services to modify and progress therapeutic interventions, address functional mobility deficits, address strength deficits, analyze and address soft tissue restrictions, analyze and cue movement patterns, and analyze and modify body mechanics/ergonomics to attain remaining goals. Progress towards goals / Updated goals:  Pt sophia addition of ex's well today. Dec pain noted after manual.      PLAN  []  Upgrade activities as tolerated     []  Continue plan of care  []  Update interventions per flow sheet       []  Discharge due to:_  []  Other:_      April Domignuez.  Liam, PT 3/7/2023

## 2023-03-08 DIAGNOSIS — I25.10 CORONARY ARTERY DISEASE INVOLVING NATIVE HEART WITHOUT ANGINA PECTORIS, UNSPECIFIED VESSEL OR LESION TYPE: Primary | ICD-10-CM

## 2023-03-08 NOTE — PROGRESS NOTES
Please call the patient to inform him I have reviewed his coronary calcium study which does show extensive CAD/elevated calcium score of 1802. I will be referring him to cardiology. Please provide scheduling information for Dr. Artem Queen and mail copy of referral to patient.

## 2023-03-09 ENCOUNTER — HOSPITAL ENCOUNTER (OUTPATIENT)
Dept: PHYSICAL THERAPY | Age: 63
End: 2023-03-09
Payer: COMMERCIAL

## 2023-03-09 NOTE — PROGRESS NOTES
Called patient. Two patient identifiers verified. Discussed results per provider's note. Verbalized understanding. Pt said he already as appt scheduled with Dr. Enzo Kennedy 4/28/23.

## 2023-03-11 DIAGNOSIS — M1A.0720 CHRONIC IDIOPATHIC GOUT INVOLVING TOE OF LEFT FOOT WITHOUT TOPHUS: ICD-10-CM

## 2023-03-12 RX ORDER — ALLOPURINOL 300 MG/1
300 TABLET ORAL DAILY
Qty: 90 TABLET | Refills: 3 | Status: SHIPPED | OUTPATIENT
Start: 2023-03-12

## 2023-03-14 ENCOUNTER — APPOINTMENT (OUTPATIENT)
Dept: PHYSICAL THERAPY | Age: 63
End: 2023-03-14
Payer: COMMERCIAL

## 2023-03-16 ENCOUNTER — APPOINTMENT (OUTPATIENT)
Dept: PHYSICAL THERAPY | Age: 63
End: 2023-03-16
Payer: COMMERCIAL

## 2023-03-21 ENCOUNTER — APPOINTMENT (OUTPATIENT)
Dept: PHYSICAL THERAPY | Age: 63
End: 2023-03-21
Payer: COMMERCIAL

## 2023-03-23 ENCOUNTER — APPOINTMENT (OUTPATIENT)
Dept: PHYSICAL THERAPY | Age: 63
End: 2023-03-23
Payer: COMMERCIAL

## 2023-03-28 ENCOUNTER — APPOINTMENT (OUTPATIENT)
Dept: PHYSICAL THERAPY | Age: 63
End: 2023-03-28
Payer: COMMERCIAL

## 2023-03-30 ENCOUNTER — APPOINTMENT (OUTPATIENT)
Dept: PHYSICAL THERAPY | Age: 63
End: 2023-03-30
Payer: COMMERCIAL

## 2023-04-14 DIAGNOSIS — F41.9 ANXIETY: ICD-10-CM

## 2023-04-17 RX ORDER — VENLAFAXINE HYDROCHLORIDE 75 MG/1
150 CAPSULE, EXTENDED RELEASE ORAL DAILY
Qty: 180 CAPSULE | Refills: 3 | Status: SHIPPED | OUTPATIENT
Start: 2023-04-17

## 2023-04-28 ENCOUNTER — OFFICE VISIT (OUTPATIENT)
Dept: CARDIOLOGY CLINIC | Age: 63
End: 2023-04-28

## 2023-04-28 VITALS
SYSTOLIC BLOOD PRESSURE: 124 MMHG | OXYGEN SATURATION: 97 % | DIASTOLIC BLOOD PRESSURE: 60 MMHG | HEART RATE: 71 BPM | HEIGHT: 69 IN | RESPIRATION RATE: 18 BRPM | WEIGHT: 212 LBS | BODY MASS INDEX: 31.4 KG/M2

## 2023-04-28 DIAGNOSIS — I10 ESSENTIAL HYPERTENSION: Primary | ICD-10-CM

## 2023-04-28 DIAGNOSIS — R93.1 ELEVATED CORONARY ARTERY CALCIUM SCORE: ICD-10-CM

## 2023-04-28 DIAGNOSIS — E78.1 HYPERTRIGLYCERIDEMIA: ICD-10-CM

## 2023-04-28 DIAGNOSIS — E78.5 DYSLIPIDEMIA: ICD-10-CM

## 2023-04-28 NOTE — PROGRESS NOTES
Leda Garces MD, MS, [address]            HISTORY OF PRESENT ILLNESS:    Annamaria Moser is a 58 y.o. male referred for     HTN  HLD  DM2 7.0 - trialing TLC, repeating. Hyperlipidemia LDL 47 on rosuva 40  Hypertriglyceridemia >1400. LDL 47  Minimal tobacco use  Elevated CCS = 1802, RCA, LAD high. Can feel his HR fast at times. LH at times. 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 BP was low at one episode. ++ SOB with exertion. EKG NSR HR 70s. Discussed echo + exercise nuclear stress testing. SUMMARY:   Problem List  Date Reviewed: 4/28/2023            Codes Class Noted    Umbilical hernia without obstruction and without gangrene ICD-10-CM: K42.9  ICD-9-CM: 553.1  8/2/2022        Liver enzyme elevation ICD-10-CM: R74.8  ICD-9-CM: 790.5  4/1/2022        Prediabetes ICD-10-CM: R73.03  ICD-9-CM: 790.29  4/1/2022        Essential hypertension ICD-10-CM: I10  ICD-9-CM: 401.9  8/4/2020        Dyslipidemia ICD-10-CM: E78.5  ICD-9-CM: 272.4  8/4/2020        Elevated coronary artery calcium score ICD-10-CM: R93.1  ICD-9-CM: 414.00  2/1/2018        Low testosterone ICD-10-CM: R79.89  ICD-9-CM: 790.99  8/9/2017        NEETU (obstructive sleep apnea) ICD-10-CM: G47.33  ICD-9-CM: 327.23  3/29/2016        Anxiety ICD-10-CM: F41.9  ICD-9-CM: 300.00  12/16/2014        Hypertriglyceridemia ICD-10-CM: E78.1  ICD-9-CM: 272.1  12/16/2014        GERD (gastroesophageal reflux disease) ICD-10-CM: K21.9  ICD-9-CM: 530.81  12/16/2014           Current Outpatient Medications on File Prior to Visit   Medication Sig    venlafaxine-SR (EFFEXOR-XR) 75 mg capsule TAKE 2 CAPSULES BY MOUTH DAILY    allopurinoL (ZYLOPRIM) 300 mg tablet TAKE 1 TABLET BY MOUTH  DAILY    rosuvastatin (CRESTOR) 40 mg tablet TAKE 1 TABLET BY MOUTH  DAILY    valsartan (DIOVAN) 320 mg tablet TAKE 1 TABLET BY MOUTH DAILY    cyclobenzaprine (FLEXERIL) 10 mg tablet Take 1 Tablet by mouth two (2) times daily as needed for Muscle Spasm(s).     fenofibrate nanocrystallized (TRICOR) 145 mg tablet Take 1 Tablet by mouth daily. Indications: high amount of triglyceride in the blood    omeprazole (PRILOSEC) 20 mg capsule TAKE 1 CAPSULE BY MOUTH  DAILY    icosapent ethyL (VASCEPA) 1 gram capsule TAKE 2 CAPSULES BY MOUTH  TWICE DAILY WITH MEALS    diclofenac EC (VOLTAREN) 75 mg EC tablet Take 1 Tablet by mouth two (2) times a day. colchicine (Colcrys) 0.6 mg tablet Take 1 Cap by mouth daily. (If signs of gout flare,take 2 tabs at  once then in one hour take 1 tab by mouth)    cetirizine (ZyrTEC) 10 mg tablet Take 1 Tablet by mouth daily. Syringe with Needle, Disp, 3 mL 22 x 1 1/2\" syrg Use to inject testosterone    Needle, Disp, 18 G 18 gauge x 1 1/2\" ndle Use to inject testosterone    testosterone cypionate (DEPOTESTOTERONE CYPIONATE) 200 mg/mL injection by IntraMUSCular route every fourteen (14) days. sildenafil citrate (VIAGRA) 100 mg tablet Take 1 Tab by mouth as needed. No current facility-administered medications on file prior to visit. CARDIOLOGY STUDIES TO DATE:  No results found for this visit on 23.                 CARDIAC ROS:   positive for dyspnea    Past Medical History:   Diagnosis Date    Anxiety     Gout     Hypercholesterolemia     Hypertension     Umbilical hernia without obstruction and without gangrene 2022       Family History   Problem Relation Age of Onset    Cancer Father        Social History     Socioeconomic History    Marital status: SINGLE     Spouse name: Not on file    Number of children: Not on file    Years of education: Not on file    Highest education level: Not on file   Occupational History    Not on file   Tobacco Use    Smoking status: Former     Packs/day: 0.50     Years: 10.00     Pack years: 5.00     Types: Cigarettes     Quit date: 2015     Years since quittin.9     Passive exposure: Past    Smokeless tobacco: Former   Vaping Use    Vaping Use: Never used   Substance and Sexual Activity Alcohol use: Yes     Alcohol/week: 8.0 standard drinks     Types: 2 Glasses of wine, 6 Cans of beer per week    Drug use: No    Sexual activity: Yes     Partners: Male     Birth control/protection: Condom   Other Topics Concern    Not on file   Social History Narrative    Not on file     Social Determinants of Health     Financial Resource Strain: Not on file   Food Insecurity: Not on file   Transportation Needs: Not on file   Physical Activity: Not on file   Stress: Not on file   Social Connections: Not on file   Intimate Partner Violence: Not on file   Housing Stability: Not on file        GENERAL ROS:  A comprehensive review of systems was negative except for that written in the HPI.     Visit Vitals  /60 (BP 1 Location: Left upper arm, BP Patient Position: Sitting, BP Cuff Size: Adult)   Pulse 71   Resp 18   Ht 5' 9\" (1.753 m)   Wt 96.2 kg (212 lb)   SpO2 97%   BMI 31.31 kg/m²     Vitals:    04/28/23 1420   BP: 124/60   Pulse: 71   Resp: 18   SpO2: 97%   Weight: 96.2 kg (212 lb)   Height: 5' 9\" (1.753 m)        Wt Readings from Last 3 Encounters:   04/28/23 96.2 kg (212 lb)   02/13/23 98.8 kg (217 lb 12.8 oz)   02/10/23 97.7 kg (215 lb 6.4 oz)            BP Readings from Last 3 Encounters:   04/28/23 124/60   02/13/23 125/73   02/10/23 132/78       PHYSICAL EXAM  General appearance: alert, cooperative, no distress, appears stated age  Neck: supple, symmetrical, trachea midline, no adenopathy, thyroid: not enlarged, symmetric, no tenderness/mass/nodules, no carotid bruit, and no JVD  Lungs: clear to auscultation bilaterally  Heart: regular rate and rhythm, S1, S2 normal, no murmur, click, rub or gallop  Extremities: extremities normal, atraumatic, no cyanosis or edema    Lab Results   Component Value Date/Time    Cholesterol, total 198 02/03/2023 07:16 AM    Cholesterol, total 89 03/28/2022 10:00 AM    Cholesterol, total 195 06/09/2020 09:07 AM    Cholesterol, total 165 09/10/2019 08:24 AM    Cholesterol, total 148 06/03/2019 10:15 AM    Cholesterol, Total 131 12/09/2020 10:33 AM    HDL Cholesterol 23 02/03/2023 07:16 AM    HDL Cholesterol 41 03/28/2022 10:00 AM    HDL Cholesterol 37 (L) 06/09/2020 09:07 AM    HDL Cholesterol 23 (L) 09/10/2019 08:24 AM    HDL Cholesterol 39 (L) 06/03/2019 10:15 AM    LDL,Direct 49 02/03/2023 07:16 AM    LDL, calculated Not calculated due to elevated triglyceride level 02/03/2023 07:16 AM    LDL, calculated 12 03/28/2022 10:00 AM    LDL, calculated Comment 06/09/2020 09:07 AM    LDL, calculated Comment 09/10/2019 08:24 AM    LDL, calculated Comment 06/03/2019 10:15 AM    Triglyceride 1,745 (H) 02/03/2023 07:16 AM    Triglyceride 180 (H) 03/28/2022 10:00 AM    Triglyceride 628 (HH) 06/09/2020 09:07 AM    Triglyceride 1,291 (HH) 09/10/2019 08:24 AM    Triglyceride 402 (H) 06/03/2019 10:15 AM    CHOL/HDL Ratio 8.6 (H) 02/03/2023 07:16 AM    CHOL/HDL Ratio 2.2 03/28/2022 10:00 AM       Lab Results   Component Value Date/Time    WBC 5.3 02/03/2023 07:16 AM    HGB 11.0 (L) 02/03/2023 07:16 AM    HCT 32.5 (L) 02/03/2023 07:16 AM    PLATELET 280 62/56/2830 07:16 AM    .8 (H) 02/03/2023 07:16 AM        Lab Results   Component Value Date/Time    Cholesterol, total 198 02/03/2023 07:16 AM    Cholesterol, total 89 03/28/2022 10:00 AM    Cholesterol, total 195 06/09/2020 09:07 AM    Cholesterol, total 165 09/10/2019 08:24 AM    Cholesterol, total 148 06/03/2019 10:15 AM    Cholesterol, Total 131 12/09/2020 10:33 AM    HDL Cholesterol 23 02/03/2023 07:16 AM    HDL Cholesterol 41 03/28/2022 10:00 AM    HDL Cholesterol 37 (L) 06/09/2020 09:07 AM    HDL Cholesterol 23 (L) 09/10/2019 08:24 AM    HDL Cholesterol 39 (L) 06/03/2019 10:15 AM    LDL,Direct 49 02/03/2023 07:16 AM    LDL, calculated Not calculated due to elevated triglyceride level 02/03/2023 07:16 AM    LDL, calculated 12 03/28/2022 10:00 AM    LDL, calculated Comment 06/09/2020 09:07 AM    LDL, calculated Comment 09/10/2019 08:24 AM LDL, calculated Comment 06/03/2019 10:15 AM    Triglyceride 1,745 (H) 02/03/2023 07:16 AM    Triglyceride 180 (H) 03/28/2022 10:00 AM    Triglyceride 628 (HH) 06/09/2020 09:07 AM    Triglyceride 1,291 (HH) 09/10/2019 08:24 AM    Triglyceride 402 (H) 06/03/2019 10:15 AM    CHOL/HDL Ratio 8.6 (H) 02/03/2023 07:16 AM    CHOL/HDL Ratio 2.2 03/28/2022 10:00 AM        ASSESSMENT  Diagnoses and all orders for this visit:    1. Essential hypertension  -     AMB POC EKG ROUTINE W/ 12 LEADS, INTER & REP  -     ECHO ADULT COMPLETE; Future  -     NUCLEAR CARDIAC STRESS TEST; Future    2. Elevated coronary artery calcium score  -     AMB POC EKG ROUTINE W/ 12 LEADS, INTER & REP  -     ECHO ADULT COMPLETE; Future  -     NUCLEAR CARDIAC STRESS TEST; Future    3. Dyslipidemia  -     ECHO ADULT COMPLETE; Future  -     NUCLEAR CARDIAC STRESS TEST; Future    4. Hypertriglyceridemia  -     ECHO ADULT COMPLETE; Future  -     NUCLEAR CARDIAC STRESS TEST; Future         Encounter Diagnoses   Name Primary? Essential hypertension Yes    Elevated coronary artery calcium score     Dyslipidemia     Hypertriglyceridemia      Orders Placed This Encounter    AMB POC EKG ROUTINE W/ 12 LEADS, INTER & REP       Plan      Follow-up and Dispositions    Return in about 2 weeks (around 5/12/2023) for with echo same day, with stress same day. Rishi Garner MD  4/30/2023        [address]   [address]  [address]  [phone] (F)    [address] [place] [address] [place]  [phone] (P)  [phone] (F)    ATTENTION:   This medical record was transcribed using an electronic medical records/speech recognition system. Although proofread, it may and can contain electronic, spelling and other errors. Corrections may be executed at a later time. Please feel free to contact us for any clarifications as needed.

## 2023-04-28 NOTE — PATIENT INSTRUCTIONS
You will be scheduled for a Nuclear Stress Test after your appointment today. Nuclear stress testing evaluates blood flow to your heart muscle and assesses cardiac function. There are 2 parts (Rest/Stress) to this procedure and will include either an exercise on a treadmill or an IV administration of a stressing medication called Lexiscan. Your cardiologist will determine which type of testing is best for you. This test can be performed in one day unless it is determined that better quality images will be obtained by performing the test over two days. *Please arrive 15 minutes prior to your appointment time    Test Duration:    -One day testing will take 4 hours     Day of testing instructions:    NO CAFFEINE (not even decaffeinated products) 24 HOURS PRIOR TO TESTING. This includes coffee, soda, tea, chocolate, multivitamins, and migraine medication, like Excedrin or Fioricet that contains caffeine. Nothing to eat or drink 4 HOURS prior to testing  NO NICOTINE 12 hours prior to testing  Hold any medications requested by your cardiologist. Otherwise take medications as directed with a few sips of water. If you are unsure you may bring your medications with you to take after instructed by your stressing nurse. It is recommended you hold none of your meds prior to your test. DIABETIC PATIENTS: Take half of your insulin with a light meal 4 hours before your test.  Wear comfortable clothes and shoes (Shirts with no metal, shorts or pants, tennis shoes, no heels or flip flops)    IMPORTANT: This testing involves a cardiac tracer ordered specifically for you. If you are unable to make your appointment, please call to cancel/reschedule AT LEAST 24 hours prior to your appointment so your tracer can be cancelled. [phone].

## 2023-04-28 NOTE — PROGRESS NOTES
Per Dr. Lyndel Canavan-   Echo, exercise nuclear stress test and follow up. Patient requested to call our office to schedule once he had his schedule available.

## 2023-04-28 NOTE — PROGRESS NOTES
Room: 5  Identified pt with two pt identifiers(name and ). Reviewed record in preparation for visit and have obtained necessary documentation. Chief Complaint   Patient presents with    Coronary Artery Disease    Hypertension    New Patient      Vitals:    23 1420   BP: 124/60   Pulse: 71   Resp: 18   SpO2: 97%   Weight: 212 lb (96.2 kg)   Height: 5' 9\" (1.753 m)   PainSc:   2   PainLoc: Generalized       Health Maintenance Review: Patient reminded of \"due or due soon\" health maintenance. I have asked the patient to contact his/her primary care provider (PCP) for follow-up on his/her health maintenance. 1. \"Have you been to the ER, urgent care clinic since your last visit? Hospitalized since your last visit? \" No    2. \"Have you seen or consulted any other health care providers outside of the [address] [place] [address] since your last visit? \" No

## 2023-05-12 RX ORDER — FENOFIBRATE 145 MG/1
145 TABLET, COATED ORAL DAILY
Qty: 90 TABLET | Refills: 1 | Status: SHIPPED | OUTPATIENT
Start: 2023-05-12 | End: 2023-05-15 | Stop reason: SDUPTHER

## 2023-05-15 RX ORDER — FENOFIBRATE 145 MG/1
145 TABLET, COATED ORAL DAILY
Qty: 90 TABLET | Refills: 1 | Status: SHIPPED | OUTPATIENT
Start: 2023-05-15

## 2023-05-15 NOTE — TELEPHONE ENCOUNTER
NP Donald Banks,    Patient call stating Request for refill to go to [facility] but and has not heard from office. Noted was sent to [facility].  Please resend to [facility].   jorge luis Troncoso    Previous refill encounter(s): 5/12/23 90 + 1 ([facility])    Requested Prescriptions     Pending Prescriptions Disp Refills    fenofibrate (TRICOR) 145 MG tablet 90 tablet 1     Sig: Take 1 tablet by mouth daily     For Pharmacy Admin Tracking Only    Program: Medication Refill  Intervention Detail: New Rx: 1, reason: Patient Preference  Time Spent (min): 5

## 2023-05-16 DIAGNOSIS — I10 ESSENTIAL (PRIMARY) HYPERTENSION: Primary | ICD-10-CM

## 2023-05-16 RX ORDER — DICLOFENAC SODIUM 75 MG/1
75 TABLET, DELAYED RELEASE ORAL 2 TIMES DAILY
COMMUNITY
Start: 2022-12-01

## 2023-05-16 RX ORDER — VENLAFAXINE HYDROCHLORIDE 75 MG/1
150 CAPSULE, EXTENDED RELEASE ORAL DAILY
COMMUNITY
Start: 2023-04-17

## 2023-05-16 RX ORDER — FENOFIBRATE 145 MG/1
145 TABLET, COATED ORAL DAILY
COMMUNITY
Start: 2023-02-10 | End: 2023-06-14 | Stop reason: SDUPTHER

## 2023-05-16 RX ORDER — ROSUVASTATIN CALCIUM 40 MG/1
40 TABLET, COATED ORAL DAILY
COMMUNITY
Start: 2023-02-16 | End: 2023-06-14 | Stop reason: SDUPTHER

## 2023-05-16 RX ORDER — VALSARTAN 320 MG/1
320 TABLET ORAL DAILY
COMMUNITY
Start: 2023-02-16

## 2023-05-16 RX ORDER — SILDENAFIL 100 MG/1
100 TABLET, FILM COATED ORAL PRN
COMMUNITY
Start: 2017-06-22

## 2023-05-16 RX ORDER — OMEPRAZOLE 20 MG/1
20 CAPSULE, DELAYED RELEASE ORAL DAILY
COMMUNITY
Start: 2023-01-18

## 2023-05-16 RX ORDER — ALLOPURINOL 300 MG/1
300 TABLET ORAL DAILY
COMMUNITY
Start: 2023-03-12

## 2023-05-16 RX ORDER — CYCLOBENZAPRINE HCL 10 MG
10 TABLET ORAL 2 TIMES DAILY PRN
COMMUNITY
Start: 2023-02-13

## 2023-06-15 DIAGNOSIS — I10 ESSENTIAL HYPERTENSION: ICD-10-CM

## 2023-06-15 DIAGNOSIS — R93.1 ELEVATED CORONARY ARTERY CALCIUM SCORE: ICD-10-CM

## 2023-06-15 DIAGNOSIS — E78.1 HYPERTRIGLYCERIDEMIA: ICD-10-CM

## 2023-06-15 DIAGNOSIS — D64.9 ANEMIA, UNSPECIFIED TYPE: ICD-10-CM

## 2023-06-15 DIAGNOSIS — E11.65 TYPE 2 DIABETES MELLITUS WITH HYPERGLYCEMIA, UNSPECIFIED WHETHER LONG TERM INSULIN USE (HCC): ICD-10-CM

## 2023-06-15 DIAGNOSIS — Z87.39 HISTORY OF GOUT: ICD-10-CM

## 2023-06-15 LAB
ALBUMIN SERPL-MCNC: 3.6 G/DL (ref 3.5–5)
ALBUMIN/GLOB SERPL: 1.1 (ref 1.1–2.2)
ALP SERPL-CCNC: 32 U/L (ref 45–117)
ALT SERPL-CCNC: 33 U/L (ref 12–78)
ANION GAP SERPL CALC-SCNC: 3 MMOL/L (ref 5–15)
AST SERPL-CCNC: 12 U/L (ref 15–37)
BASOPHILS # BLD: 0 K/UL (ref 0–0.1)
BASOPHILS NFR BLD: 0 % (ref 0–1)
BILIRUB SERPL-MCNC: 0.3 MG/DL (ref 0.2–1)
BUN SERPL-MCNC: 27 MG/DL (ref 6–20)
BUN/CREAT SERPL: 22 (ref 12–20)
CALCIUM SERPL-MCNC: 9.6 MG/DL (ref 8.5–10.1)
CHLORIDE SERPL-SCNC: 105 MMOL/L (ref 97–108)
CHOLEST SERPL-MCNC: 117 MG/DL
CO2 SERPL-SCNC: 28 MMOL/L (ref 21–32)
COMMENT:: NORMAL
CREAT SERPL-MCNC: 1.23 MG/DL (ref 0.7–1.3)
CREAT UR-MCNC: 39 MG/DL
DIFFERENTIAL METHOD BLD: ABNORMAL
EOSINOPHIL # BLD: 0.1 K/UL (ref 0–0.4)
EOSINOPHIL NFR BLD: 1 % (ref 0–7)
ERYTHROCYTE [DISTWIDTH] IN BLOOD BY AUTOMATED COUNT: 13.4 % (ref 11.5–14.5)
GLOBULIN SER CALC-MCNC: 3.2 G/DL (ref 2–4)
GLUCOSE SERPL-MCNC: 145 MG/DL (ref 65–100)
HCT VFR BLD AUTO: 32.6 % (ref 36.6–50.3)
HDLC SERPL-MCNC: 28 MG/DL
HDLC SERPL: 4.2 (ref 0–5)
HGB BLD-MCNC: 10.3 G/DL (ref 12.1–17)
IMM GRANULOCYTES # BLD AUTO: 0.1 K/UL (ref 0–0.04)
IMM GRANULOCYTES NFR BLD AUTO: 1 % (ref 0–0.5)
LDLC SERPL CALC-MCNC: ABNORMAL MG/DL (ref 0–100)
LDLC SERPL DIRECT ASSAY-MCNC: 32 MG/DL (ref 0–100)
LYMPHOCYTES # BLD: 2.9 K/UL (ref 0.8–3.5)
LYMPHOCYTES NFR BLD: 41 % (ref 12–49)
MCH RBC QN AUTO: 33.8 PG (ref 26–34)
MCHC RBC AUTO-ENTMCNC: 31.6 G/DL (ref 30–36.5)
MCV RBC AUTO: 106.9 FL (ref 80–99)
MICROALBUMIN UR-MCNC: <0.5 MG/DL
MICROALBUMIN/CREAT UR-RTO: <13 MG/G (ref 0–30)
MONOCYTES # BLD: 0.5 K/UL (ref 0–1)
MONOCYTES NFR BLD: 8 % (ref 5–13)
NEUTS SEG # BLD: 3.5 K/UL (ref 1.8–8)
NEUTS SEG NFR BLD: 49 % (ref 32–75)
NRBC # BLD: 0 K/UL (ref 0–0.01)
NRBC BLD-RTO: 0 PER 100 WBC
PLATELET # BLD AUTO: 207 K/UL (ref 150–400)
PMV BLD AUTO: 10.8 FL (ref 8.9–12.9)
POTASSIUM SERPL-SCNC: 4.1 MMOL/L (ref 3.5–5.1)
PROT SERPL-MCNC: 6.8 G/DL (ref 6.4–8.2)
RBC # BLD AUTO: 3.05 M/UL (ref 4.1–5.7)
SODIUM SERPL-SCNC: 136 MMOL/L (ref 136–145)
SPECIMEN HOLD: NORMAL
TRIGL SERPL-MCNC: 625 MG/DL
URATE SERPL-MCNC: 4 MG/DL (ref 3.5–7.2)
VLDLC SERPL CALC-MCNC: ABNORMAL MG/DL
WBC # BLD AUTO: 7.2 K/UL (ref 4.1–11.1)

## 2023-06-16 LAB — URATE SERPL-MCNC: 4.1 MG/DL (ref 3.5–7.2)

## 2023-06-19 ENCOUNTER — PATIENT MESSAGE (OUTPATIENT)
Age: 63
End: 2023-06-19

## 2023-06-19 DIAGNOSIS — Z12.11 COLON CANCER SCREENING: Primary | ICD-10-CM

## 2023-07-25 NOTE — TELEPHONE ENCOUNTER
PCP: ANJELICA Holm NP    Last appt: 6/14/2023   Future Appointments   Date Time Provider [address]   8/3/2023  2:00 PM Veronica Livingston MD [facility] BS AMB   12/14/2023  8:45 AM AmANJELICA Simeon NP AMB       Requested Prescriptions     Pending Prescriptions Disp Refills    colchicine (COLCRYS) 0.6 MG tablet [Pharmacy Med Name: COLCHICINE  0.6MG  TAB] 90 tablet 3     Sig: TAKE 1 TABLET BY MOUTH  DAILY .  IF SIGNS OF GOUT  FLARE, TAKE 2 TABLETS AT  ONCE THEN 1 TABLET IN 1  HOUR)         Prior labs and Blood pressures:  BP Readings from Last 3 Encounters:   06/19/23 132/70   06/14/23 118/67   04/28/23 124/60     Lab Results   Component Value Date/Time     06/15/2023 07:25 AM    K 4.1 06/15/2023 07:25 AM     06/15/2023 07:25 AM    CO2 28 06/15/2023 07:25 AM    BUN 27 06/15/2023 07:25 AM    GFRAA >60 07/11/2022 02:03 PM     Lab Results   Component Value Date/Time    QAO9RZGY 6.2 06/14/2023 09:03 AM     Lab Results   Component Value Date/Time    CHOL 117 06/15/2023 07:25 AM    CHOL 131 12/09/2020 10:33 AM    HDL 28 06/15/2023 07:25 AM     No results found for: VITD3, VD3RIA    Lab Results   Component Value Date/Time    TSH 2.190 06/03/2019 10:15 AM

## 2023-07-26 RX ORDER — COLCHICINE 0.6 MG/1
TABLET ORAL
Qty: 90 TABLET | Refills: 0 | Status: SHIPPED | OUTPATIENT
Start: 2023-07-26 | End: 2023-09-20

## 2023-08-03 ENCOUNTER — OFFICE VISIT (OUTPATIENT)
Age: 63
End: 2023-08-03
Payer: COMMERCIAL

## 2023-08-03 VITALS
SYSTOLIC BLOOD PRESSURE: 92 MMHG | OXYGEN SATURATION: 98 % | HEIGHT: 68 IN | HEART RATE: 94 BPM | DIASTOLIC BLOOD PRESSURE: 52 MMHG | WEIGHT: 213 LBS | BODY MASS INDEX: 32.28 KG/M2

## 2023-08-03 DIAGNOSIS — I10 ESSENTIAL HYPERTENSION: ICD-10-CM

## 2023-08-03 DIAGNOSIS — E78.5 DYSLIPIDEMIA: ICD-10-CM

## 2023-08-03 DIAGNOSIS — E78.1 HYPERTRIGLYCERIDEMIA: ICD-10-CM

## 2023-08-03 DIAGNOSIS — I77.819 DILATION OF AORTA (HCC): Primary | ICD-10-CM

## 2023-08-03 DIAGNOSIS — R93.1 ABNORMAL FINDINGS ON DIAGNOSTIC IMAGING OF HEART AND CORONARY CIRCULATION: ICD-10-CM

## 2023-08-03 DIAGNOSIS — R93.1 ELEVATED CORONARY ARTERY CALCIUM SCORE: ICD-10-CM

## 2023-08-03 PROCEDURE — 3078F DIAST BP <80 MM HG: CPT | Performed by: INTERNAL MEDICINE

## 2023-08-03 PROCEDURE — 99214 OFFICE O/P EST MOD 30 MIN: CPT | Performed by: INTERNAL MEDICINE

## 2023-08-03 PROCEDURE — 3074F SYST BP LT 130 MM HG: CPT | Performed by: INTERNAL MEDICINE

## 2023-08-03 ASSESSMENT — PATIENT HEALTH QUESTIONNAIRE - PHQ9
2. FEELING DOWN, DEPRESSED OR HOPELESS: 0
SUM OF ALL RESPONSES TO PHQ9 QUESTIONS 1 & 2: 0
1. LITTLE INTEREST OR PLEASURE IN DOING THINGS: 0
SUM OF ALL RESPONSES TO PHQ QUESTIONS 1-9: 0

## 2023-08-03 ASSESSMENT — ENCOUNTER SYMPTOMS
WHEEZING: 0
SHORTNESS OF BREATH: 0
CHEST TIGHTNESS: 0

## 2023-08-03 NOTE — PROGRESS NOTES
Per Dr. Andrews Draft- lipid panel (to be managed by PCP), echo and follow up in 12-18 mos.
extended release capsule Take 2 capsules by mouth daily      fenofibrate (TRICOR) 145 MG tablet Take 1 tablet by mouth daily 90 tablet 1    cetirizine (ZYRTEC) 10 MG tablet Take 1 tablet by mouth daily      Icosapent Ethyl (VASCEPA) 1 g CAPS capsule TAKE 2 CAPSULES BY MOUTH  TWICE DAILY WITH MEALS      testosterone cypionate (DEPOTESTOTERONE CYPIONATE) 200 MG/ML injection testosterone cypionate 200 mg/mL intramuscular oil      amoxicillin (AMOXIL) 500 MG capsule Take 1 capsule by mouth 3 times daily (Patient not taking: Reported on 8/3/2023)      cyclobenzaprine (FLEXERIL) 10 MG tablet Take 1 tablet by mouth 2 times daily as needed (Patient not taking: Reported on 8/3/2023)      diclofenac (VOLTAREN) 75 MG EC tablet Take 1 tablet by mouth 2 times daily (Patient not taking: Reported on 8/3/2023)       No current facility-administered medications for this visit. CARDIOLOGY STUDIES TO DATE:  06/06/23    TRANSTHORACIC ECHOCARDIOGRAM (TTE) COMPLETE (CONTRAST/BUBBLE/3D PRN) 06/07/2023  9:36 AM (Final)    Interpretation Summary    Left Ventricle: Normal left ventricular systolic function with a visually estimated EF of 50 - 55%. Left ventricle size is normal. Mildly increased wall thickness. Findings consistent with mild concentric hypertrophy. Normal wall motion. Normal diastolic function. Aortic Valve: Mild to moderate regurgitation. Mitral Valve: Mild regurgitation. Tricuspid Valve: Mild regurgitation. Normal RVSP. The estimated RVSP is 31 mmHg. Aorta: Mildly dilated aortic root. Ao root diameter is 4.0 cm. Mildly dilated ascending aorta. Ao ascending diameter is 4.1 cm. Signed by: Joe Ruvalcaba MD on 6/7/2023  9:36 AM    06/19/23    NM STRESS TEST WITH MYOCARDIAL PERFUSION 06/27/2023  5:46 PM (Final)    Interpretation Summary    Stress Combined Conclusion: Findings suggest a low risk of myocardial ischemia.     Perfusion Comments: LV perfusion is probably normal. There is no evidence of inducible

## 2023-08-08 DIAGNOSIS — I10 ESSENTIAL HYPERTENSION: ICD-10-CM

## 2023-08-08 DIAGNOSIS — E78.5 DYSLIPIDEMIA: ICD-10-CM

## 2023-08-08 DIAGNOSIS — R93.1 ELEVATED CORONARY ARTERY CALCIUM SCORE: ICD-10-CM

## 2023-08-08 DIAGNOSIS — E78.1 HYPERTRIGLYCERIDEMIA: ICD-10-CM

## 2023-08-08 LAB
COMMENT:: NORMAL
SPECIMEN HOLD: NORMAL

## 2023-08-10 LAB
CHOLEST SERPL-MCNC: 145 MG/DL
HDLC SERPL-MCNC: 30 MG/DL
HDLC SERPL: 4.8 (ref 0–5)
LDLC SERPL CALC-MCNC: ABNORMAL MG/DL (ref 0–100)
LDLC SERPL DIRECT ASSAY-MCNC: 47 MG/DL (ref 0–100)
TRIGL SERPL-MCNC: 732 MG/DL
VLDLC SERPL CALC-MCNC: ABNORMAL MG/DL

## 2023-08-10 NOTE — RESULT ENCOUNTER NOTE
Your triglycerides are still quite elevated. Yoru LDL is not calculable due to the elevated TG. Continue rosuvastatin, fenofibrate  Reduce carbs sugar, ETOH. Exercise regularly. May try OTC fish oil.     Dr. Jayesh Schafer

## 2023-08-25 ENCOUNTER — TELEPHONE (OUTPATIENT)
Age: 63
End: 2023-08-25

## 2023-08-25 NOTE — TELEPHONE ENCOUNTER
Attempted to call back Nestor Richmond, no answer. Left message stating we are unable to send images from our office, provided her with number to [address] Room ([phone]) where they can provide discs of images and to [address] ([phone]) so one of those places could hopefully provide her with images.

## 2023-08-25 NOTE — TELEPHONE ENCOUNTER
Lakisha Amilcars from [facility] called stating that they received patient medical records, but not his images. They are hoping there is a way for us to send them his images, as requested on the medical record release form. Looking at the form I do see that these images are requested, but they have not received them. They are hoping we can send these, and to receive a call back when this has been completed. They also stated that if we are unable to do so, then they would like a call back on how to receive those. Best call back number for Senaida Closs  [phone]  EXT.  [phone]  Fax number  [phone]

## 2023-09-18 NOTE — TELEPHONE ENCOUNTER
PCP: ANJELICA Gustafson NP    Last appt: 6/14/2023   Future Appointments   Date Time Provider [address]   12/14/2023  8:45 AM ANJELICA Osorio NP AMB   8/16/2024  8:00 AM BSASMITA RICHARD ECHO 1 RUSTAM MAGUIRE AMB   8/16/2024  9:00 AM MD RUSTAM Smith BS AMB       Requested Prescriptions     Pending Prescriptions Disp Refills    fenofibrate (TRICOR) 145 MG tablet [Pharmacy Med Name: Fenofibrate 145 MG Oral Tablet] 90 tablet 3     Sig: TAKE 1 TABLET BY MOUTH DAILY    colchicine (COLCRYS) 0.6 MG tablet [Pharmacy Med Name: COLCHICINE  0.6MG  TAB] 90 tablet 3     Sig: TAKE 1 TABLET BY MOUTH DAILY IF  SIGNS OF GOUT FLARE TAKE 2  TABLETS BY MOUTH AT 1 TIME THEN  1 TABLET BY MOUTH IN 1 HOUR)         Prior labs and Blood pressures:  BP Readings from Last 3 Encounters:   08/03/23 (!) 92/52   06/19/23 132/70   06/14/23 118/67     Lab Results   Component Value Date/Time     06/15/2023 07:25 AM    K 4.1 06/15/2023 07:25 AM     06/15/2023 07:25 AM    CO2 28 06/15/2023 07:25 AM    BUN 27 06/15/2023 07:25 AM    GFRAA >60 07/11/2022 02:03 PM     Lab Results   Component Value Date/Time    BPQ5DQBQ 6.2 06/14/2023 09:03 AM     Lab Results   Component Value Date/Time    CHOL 145 08/08/2023 08:24 AM    CHOL 131 12/09/2020 10:33 AM    HDL 30 08/08/2023 08:24 AM     No results found for: \"VITD3\", \"VD3RIA\"    Lab Results   Component Value Date/Time    TSH 2.190 06/03/2019 10:15 AM

## 2023-09-20 RX ORDER — FENOFIBRATE 145 MG/1
145 TABLET, COATED ORAL DAILY
Qty: 90 TABLET | Refills: 3 | Status: SHIPPED | OUTPATIENT
Start: 2023-09-20

## 2023-09-20 RX ORDER — COLCHICINE 0.6 MG/1
TABLET ORAL
Qty: 90 TABLET | Refills: 3 | Status: SHIPPED | OUTPATIENT
Start: 2023-09-20

## 2023-10-10 ENCOUNTER — OFFICE VISIT (OUTPATIENT)
Age: 63
End: 2023-10-10
Payer: COMMERCIAL

## 2023-10-10 VITALS
RESPIRATION RATE: 18 BRPM | BODY MASS INDEX: 33.34 KG/M2 | HEART RATE: 89 BPM | SYSTOLIC BLOOD PRESSURE: 121 MMHG | HEIGHT: 68 IN | TEMPERATURE: 98.5 F | OXYGEN SATURATION: 97 % | WEIGHT: 220 LBS | DIASTOLIC BLOOD PRESSURE: 70 MMHG

## 2023-10-10 DIAGNOSIS — K42.9 UMBILICAL HERNIA WITHOUT OBSTRUCTION OR GANGRENE: Primary | ICD-10-CM

## 2023-10-10 PROCEDURE — 3074F SYST BP LT 130 MM HG: CPT | Performed by: SURGERY

## 2023-10-10 PROCEDURE — 99213 OFFICE O/P EST LOW 20 MIN: CPT | Performed by: SURGERY

## 2023-10-10 PROCEDURE — 3078F DIAST BP <80 MM HG: CPT | Performed by: SURGERY

## 2023-10-10 RX ORDER — ACETAMINOPHEN 325 MG/1
1000 TABLET ORAL ONCE
Status: CANCELLED | OUTPATIENT
Start: 2023-10-10 | End: 2023-10-10

## 2023-10-10 RX ORDER — BUPIVACAINE HYDROCHLORIDE 2.5 MG/ML
30 INJECTION, SOLUTION EPIDURAL; INFILTRATION; INTRACAUDAL ONCE
Status: CANCELLED | OUTPATIENT
Start: 2023-10-10 | End: 2023-10-10

## 2023-10-10 ASSESSMENT — ENCOUNTER SYMPTOMS
CONSTIPATION: 0
NAUSEA: 0
COUGH: 0
ABDOMINAL PAIN: 1
VOMITING: 0

## 2023-10-10 ASSESSMENT — PATIENT HEALTH QUESTIONNAIRE - PHQ9
SUM OF ALL RESPONSES TO PHQ QUESTIONS 1-9: 0
2. FEELING DOWN, DEPRESSED OR HOPELESS: 0
SUM OF ALL RESPONSES TO PHQ QUESTIONS 1-9: 0
SUM OF ALL RESPONSES TO PHQ9 QUESTIONS 1 & 2: 0
SUM OF ALL RESPONSES TO PHQ QUESTIONS 1-9: 0
1. LITTLE INTEREST OR PLEASURE IN DOING THINGS: 0
SUM OF ALL RESPONSES TO PHQ QUESTIONS 1-9: 0

## 2023-10-10 NOTE — PROGRESS NOTES
Kirsten Bright is a 61 y.o. male who returns for follow up of an umbilical hernia. Mr. Leticia Vang was last seen on 2022 for further evaluation of an umbilical hernia. Doing well since then. However, Mr. Leticia Vang tells me that the umbilical hernia has become somewhat larger and more bothersome to him. No associated nausea or vomiting. No h/o chronic cough or constipation. He has otherwise been in his usual state of health. Past Medical History:   Diagnosis Date    Anxiety     Gout     Hypercholesterolemia     Hypertension     Umbilical hernia without obstruction and without gangrene 2022     Past Surgical History:   Procedure Laterality Date    LUMBAR LAMINECTOMY      ORTHOPEDIC SURGERY  05/28/15    right humerus    TONSILLECTOMY       Family History   Problem Relation Age of Onset    Cancer Father      Social History     Socioeconomic History    Marital status: Single     Spouse name: None    Number of children: None    Years of education: None    Highest education level: None   Tobacco Use    Smoking status: Former     Packs/day: .5     Types: Cigarettes     Quit date: 2015     Years since quittin.3    Smokeless tobacco: Former   Substance and Sexual Activity    Alcohol use: Yes     Alcohol/week: 2.0 standard drinks of alcohol     Types: 2 Shots of liquor per week    Drug use: No    Sexual activity: Yes     Partners: Male     Birth control/protection: Condom     Review of systems negative except as noted. Review of Systems   Respiratory:  Negative for cough. Gastrointestinal:  Positive for abdominal pain. Negative for constipation, nausea and vomiting. Physical Exam  Vitals reviewed. Constitutional:       General: He is not in acute distress. Appearance: Normal appearance. He is obese. HENT:      Head: Normocephalic and atraumatic. Eyes:      General: No scleral icterus. Cardiovascular:      Rate and Rhythm: Normal rate and regular rhythm.    Pulmonary:

## 2023-10-11 ENCOUNTER — ANESTHESIA EVENT (OUTPATIENT)
Facility: HOSPITAL | Age: 63
End: 2023-10-11
Payer: COMMERCIAL

## 2023-10-12 ENCOUNTER — HOSPITAL ENCOUNTER (OUTPATIENT)
Facility: HOSPITAL | Age: 63
Setting detail: OUTPATIENT SURGERY
Discharge: HOME | End: 2023-10-12
Attending: SURGERY | Admitting: SURGERY
Payer: COMMERCIAL

## 2023-10-12 ENCOUNTER — ANESTHESIA (OUTPATIENT)
Facility: HOSPITAL | Age: 63
End: 2023-10-12
Payer: COMMERCIAL

## 2023-10-12 VITALS
HEART RATE: 93 BPM | RESPIRATION RATE: 18 BRPM | HEIGHT: 69 IN | DIASTOLIC BLOOD PRESSURE: 61 MMHG | TEMPERATURE: 98.7 F | BODY MASS INDEX: 31.1 KG/M2 | OXYGEN SATURATION: 94 % | WEIGHT: 210 LBS | SYSTOLIC BLOOD PRESSURE: 117 MMHG

## 2023-10-12 DIAGNOSIS — K42.9 UMBILICAL HERNIA WITHOUT OBSTRUCTION AND WITHOUT GANGRENE: Primary | ICD-10-CM

## 2023-10-12 LAB
GLUCOSE BLD STRIP.AUTO-MCNC: 156 MG/DL (ref 65–117)
SERVICE CMNT-IMP: ABNORMAL

## 2023-10-12 PROCEDURE — 6360000002 HC RX W HCPCS: Performed by: ANESTHESIOLOGY

## 2023-10-12 PROCEDURE — C1781 MESH (IMPLANTABLE): HCPCS | Performed by: SURGERY

## 2023-10-12 PROCEDURE — 2709999900 HC NON-CHARGEABLE SUPPLY: Performed by: SURGERY

## 2023-10-12 PROCEDURE — 6360000002 HC RX W HCPCS: Performed by: SURGERY

## 2023-10-12 PROCEDURE — 3600000002 HC SURGERY LEVEL 2 BASE: Performed by: SURGERY

## 2023-10-12 PROCEDURE — 3700000000 HC ANESTHESIA ATTENDED CARE: Performed by: SURGERY

## 2023-10-12 PROCEDURE — 7100000010 HC PHASE II RECOVERY - FIRST 15 MIN: Performed by: SURGERY

## 2023-10-12 PROCEDURE — 6370000000 HC RX 637 (ALT 250 FOR IP): Performed by: SURGERY

## 2023-10-12 PROCEDURE — 3700000001 HC ADD 15 MINUTES (ANESTHESIA): Performed by: SURGERY

## 2023-10-12 PROCEDURE — 3600000012 HC SURGERY LEVEL 2 ADDTL 15MIN: Performed by: SURGERY

## 2023-10-12 PROCEDURE — 7100000000 HC PACU RECOVERY - FIRST 15 MIN: Performed by: SURGERY

## 2023-10-12 PROCEDURE — 2500000003 HC RX 250 WO HCPCS: Performed by: ANESTHESIOLOGY

## 2023-10-12 PROCEDURE — 82962 GLUCOSE BLOOD TEST: CPT

## 2023-10-12 DEVICE — IMPLANTABLE DEVICE: Type: IMPLANTABLE DEVICE | Site: UMBILICAL | Status: FUNCTIONAL

## 2023-10-12 RX ORDER — FENTANYL CITRATE 50 UG/ML
25 INJECTION, SOLUTION INTRAMUSCULAR; INTRAVENOUS EVERY 5 MIN PRN
Status: DISCONTINUED | OUTPATIENT
Start: 2023-10-12 | End: 2023-10-12 | Stop reason: HOSPADM

## 2023-10-12 RX ORDER — EPHEDRINE SULFATE/0.9% NACL/PF 50 MG/5 ML
SYRINGE (ML) INTRAVENOUS PRN
Status: DISCONTINUED | OUTPATIENT
Start: 2023-10-12 | End: 2023-10-12 | Stop reason: SDUPTHER

## 2023-10-12 RX ORDER — ROCURONIUM BROMIDE 50 MG/5 ML
SYRINGE (ML) INTRAVENOUS PRN
Status: DISCONTINUED | OUTPATIENT
Start: 2023-10-12 | End: 2023-10-12 | Stop reason: SDUPTHER

## 2023-10-12 RX ORDER — SODIUM CHLORIDE 0.9 % (FLUSH) 0.9 %
5-40 SYRINGE (ML) INJECTION EVERY 12 HOURS SCHEDULED
Status: DISCONTINUED | OUTPATIENT
Start: 2023-10-12 | End: 2023-10-12 | Stop reason: HOSPADM

## 2023-10-12 RX ORDER — KETOROLAC TROMETHAMINE 30 MG/ML
INJECTION, SOLUTION INTRAMUSCULAR; INTRAVENOUS PRN
Status: DISCONTINUED | OUTPATIENT
Start: 2023-10-12 | End: 2023-10-12 | Stop reason: SDUPTHER

## 2023-10-12 RX ORDER — ACETAMINOPHEN 500 MG
1000 TABLET ORAL EVERY 6 HOURS PRN
Qty: 30 TABLET | Refills: 2 | Status: SHIPPED | OUTPATIENT
Start: 2023-10-12

## 2023-10-12 RX ORDER — OXYCODONE HYDROCHLORIDE 5 MG/1
5 TABLET ORAL EVERY 4 HOURS PRN
Qty: 6 TABLET | Refills: 0 | Status: SHIPPED | OUTPATIENT
Start: 2023-10-12 | End: 2023-10-15

## 2023-10-12 RX ORDER — SODIUM CHLORIDE 0.9 % (FLUSH) 0.9 %
5-40 SYRINGE (ML) INJECTION PRN
Status: DISCONTINUED | OUTPATIENT
Start: 2023-10-12 | End: 2023-10-12 | Stop reason: HOSPADM

## 2023-10-12 RX ORDER — MIDAZOLAM HYDROCHLORIDE 1 MG/ML
INJECTION INTRAMUSCULAR; INTRAVENOUS PRN
Status: DISCONTINUED | OUTPATIENT
Start: 2023-10-12 | End: 2023-10-12 | Stop reason: SDUPTHER

## 2023-10-12 RX ORDER — DEXAMETHASONE SODIUM PHOSPHATE 4 MG/ML
INJECTION, SOLUTION INTRA-ARTICULAR; INTRALESIONAL; INTRAMUSCULAR; INTRAVENOUS; SOFT TISSUE PRN
Status: DISCONTINUED | OUTPATIENT
Start: 2023-10-12 | End: 2023-10-12 | Stop reason: SDUPTHER

## 2023-10-12 RX ORDER — SODIUM CHLORIDE 9 MG/ML
INJECTION, SOLUTION INTRAVENOUS PRN
Status: DISCONTINUED | OUTPATIENT
Start: 2023-10-12 | End: 2023-10-12 | Stop reason: HOSPADM

## 2023-10-12 RX ORDER — LIDOCAINE HYDROCHLORIDE 20 MG/ML
INJECTION, SOLUTION EPIDURAL; INFILTRATION; INTRACAUDAL; PERINEURAL PRN
Status: DISCONTINUED | OUTPATIENT
Start: 2023-10-12 | End: 2023-10-12 | Stop reason: SDUPTHER

## 2023-10-12 RX ORDER — ONDANSETRON 2 MG/ML
INJECTION INTRAMUSCULAR; INTRAVENOUS PRN
Status: DISCONTINUED | OUTPATIENT
Start: 2023-10-12 | End: 2023-10-12 | Stop reason: SDUPTHER

## 2023-10-12 RX ORDER — FENTANYL CITRATE 50 UG/ML
INJECTION, SOLUTION INTRAMUSCULAR; INTRAVENOUS PRN
Status: DISCONTINUED | OUTPATIENT
Start: 2023-10-12 | End: 2023-10-12 | Stop reason: SDUPTHER

## 2023-10-12 RX ORDER — BUPIVACAINE HYDROCHLORIDE 2.5 MG/ML
30 INJECTION, SOLUTION EPIDURAL; INFILTRATION; INTRACAUDAL ONCE
Status: COMPLETED | OUTPATIENT
Start: 2023-10-12 | End: 2023-10-12

## 2023-10-12 RX ORDER — SODIUM CHLORIDE, SODIUM LACTATE, POTASSIUM CHLORIDE, CALCIUM CHLORIDE 600; 310; 30; 20 MG/100ML; MG/100ML; MG/100ML; MG/100ML
INJECTION, SOLUTION INTRAVENOUS CONTINUOUS
Status: DISCONTINUED | OUTPATIENT
Start: 2023-10-12 | End: 2023-10-12 | Stop reason: HOSPADM

## 2023-10-12 RX ORDER — PROPOFOL 10 MG/ML
INJECTION, EMULSION INTRAVENOUS PRN
Status: DISCONTINUED | OUTPATIENT
Start: 2023-10-12 | End: 2023-10-12 | Stop reason: SDUPTHER

## 2023-10-12 RX ORDER — ACETAMINOPHEN 500 MG
1000 TABLET ORAL ONCE
Status: COMPLETED | OUTPATIENT
Start: 2023-10-12 | End: 2023-10-12

## 2023-10-12 RX ORDER — SUCCINYLCHOLINE/SOD CL,ISO/PF 100 MG/5ML
SYRINGE (ML) INTRAVENOUS PRN
Status: DISCONTINUED | OUTPATIENT
Start: 2023-10-12 | End: 2023-10-12 | Stop reason: SDUPTHER

## 2023-10-12 RX ORDER — CEFAZOLIN SODIUM 1 G/3ML
INJECTION, POWDER, FOR SOLUTION INTRAMUSCULAR; INTRAVENOUS PRN
Status: DISCONTINUED | OUTPATIENT
Start: 2023-10-12 | End: 2023-10-12 | Stop reason: SDUPTHER

## 2023-10-12 RX ORDER — MIDAZOLAM HYDROCHLORIDE 1 MG/ML
INJECTION INTRAMUSCULAR; INTRAVENOUS PRN
Status: DISCONTINUED | OUTPATIENT
Start: 2023-10-12 | End: 2023-10-12

## 2023-10-12 RX ORDER — FENTANYL CITRATE 50 UG/ML
50 INJECTION, SOLUTION INTRAMUSCULAR; INTRAVENOUS EVERY 5 MIN PRN
Status: DISCONTINUED | OUTPATIENT
Start: 2023-10-12 | End: 2023-10-12 | Stop reason: HOSPADM

## 2023-10-12 RX ADMIN — CEFAZOLIN 2 G: 330 INJECTION, POWDER, FOR SOLUTION INTRAMUSCULAR; INTRAVENOUS at 08:14

## 2023-10-12 RX ADMIN — FENTANYL CITRATE 50 MCG: 50 INJECTION, SOLUTION INTRAMUSCULAR; INTRAVENOUS at 08:48

## 2023-10-12 RX ADMIN — MIDAZOLAM 2 MG: 1 INJECTION INTRAMUSCULAR; INTRAVENOUS at 08:01

## 2023-10-12 RX ADMIN — Medication 20 MG: at 08:36

## 2023-10-12 RX ADMIN — Medication 20 MG: at 09:09

## 2023-10-12 RX ADMIN — Medication 100 MG: at 08:07

## 2023-10-12 RX ADMIN — Medication 20 MG: at 08:14

## 2023-10-12 RX ADMIN — FENTANYL CITRATE 100 MCG: 50 INJECTION, SOLUTION INTRAMUSCULAR; INTRAVENOUS at 08:07

## 2023-10-12 RX ADMIN — DEXAMETHASONE SODIUM PHOSPHATE 4 MG: 4 INJECTION, SOLUTION INTRAMUSCULAR; INTRAVENOUS at 08:04

## 2023-10-12 RX ADMIN — Medication 1 MG: at 08:07

## 2023-10-12 RX ADMIN — LIDOCAINE HYDROCHLORIDE 100 MG: 20 INJECTION, SOLUTION EPIDURAL; INFILTRATION; INTRACAUDAL; PERINEURAL at 08:07

## 2023-10-12 RX ADMIN — PROPOFOL 130 MG: 10 INJECTION, EMULSION INTRAVENOUS at 08:14

## 2023-10-12 RX ADMIN — Medication 10 MG: at 08:36

## 2023-10-12 RX ADMIN — Medication 20 MG: at 08:25

## 2023-10-12 RX ADMIN — ONDANSETRON 4 MG: 2 INJECTION INTRAMUSCULAR; INTRAVENOUS at 08:04

## 2023-10-12 RX ADMIN — KETOROLAC TROMETHAMINE 30 MG: 30 INJECTION INTRAMUSCULAR; INTRAVENOUS at 08:58

## 2023-10-12 RX ADMIN — ACETAMINOPHEN 1000 MG: 500 TABLET ORAL at 07:16

## 2023-10-12 RX ADMIN — FENTANYL CITRATE 50 MCG: 50 INJECTION, SOLUTION INTRAMUSCULAR; INTRAVENOUS at 08:01

## 2023-10-12 RX ADMIN — PROPOFOL 200 MG: 10 INJECTION, EMULSION INTRAVENOUS at 08:07

## 2023-10-12 ASSESSMENT — PAIN - FUNCTIONAL ASSESSMENT
PAIN_FUNCTIONAL_ASSESSMENT: NONE - DENIES PAIN
PAIN_FUNCTIONAL_ASSESSMENT: ADULT NONVERBAL PAIN SCALE (NPVS)
PAIN_FUNCTIONAL_ASSESSMENT: 0-10
PAIN_FUNCTIONAL_ASSESSMENT: NONE - DENIES PAIN
PAIN_FUNCTIONAL_ASSESSMENT: ADULT NONVERBAL PAIN SCALE (NPVS)
PAIN_FUNCTIONAL_ASSESSMENT: NONE - DENIES PAIN
PAIN_FUNCTIONAL_ASSESSMENT: ADULT NONVERBAL PAIN SCALE (NPVS)
PAIN_FUNCTIONAL_ASSESSMENT: NONE - DENIES PAIN

## 2023-10-12 NOTE — PROGRESS NOTES
Intra op on induction I was unable to intubate with laryngoscope. Successfully intubated via Glidescope.
Patient suffered an laryngospasm shortly after arriving to [facility]. Oxygen saturation dropped to 70 percent during episode. Dr Mary Dinero provider immediately called back to pacu bedside. 2nd Pacu RN Cornel present  during event. She retrieved Ambu bag from head of bag and connected to oxygen. second RN and Anesthesia provider stayed at bedside, writer retrieved  Intubation equipment. Patient did not require intubation or additional medication. Patient came out of spasm after using ambu bag. Patient kept in pacu x 1 hour post procedure. Patient post spasm breathing comfortably on RA, breathing noted as even and non labored on RA. Patient A&OX4/ SR on monitor post episode and during the duration of stay in Pacu.
Discharged

## 2023-10-12 NOTE — PERIOP NOTE
Hilda Farrarmayda  1960  [id number]    Situation:  Verbal report given from: Dr Anay Carter RN  Procedure: Procedure(s):  REPAIR UMBILICAL HERNIA WITH MESH    Background:    Preoperative diagnosis: Umbilical hernia without obstruction and without gangrene [K42.9]    Postoperative diagnosis: * No post-op diagnosis entered *    :  Lizbeth Mccauley     Assistant(s): Circulator: Cameron Cohen RN  Scrub Person First: Yoli Sanches    Specimens: * No specimens in log *    Assessment:  Intra-procedure medications         Anesthesia gave intra-procedure sedation and medications, see anesthesia flow sheet     Intravenous fluids: LR@ KVO     Vital signs stable

## 2023-10-12 NOTE — H&P
Date of Surgery Update:  Meir Marroquin was seen and examined. History and physical has been reviewed. The patient has been examined. There have been no significant clinical changes since the completion of the originally dated History and Physical.  Patient identified by surgeon; surgical site was confirmed by patient and surgeon. Signed By: Alena Lucio MD     October 12, 2023 7:54 AM         Please note from the office and include the additional information below:    Past Medical History  Past Medical History:   Diagnosis Date    Anxiety     Gout     Hypercholesterolemia     Hypertension     Umbilical hernia without obstruction and without gangrene 8/2/2022        Past Surgical History  Past Surgical History:   Procedure Laterality Date    LUMBAR LAMINECTOMY      ORTHOPEDIC SURGERY  05/28/15    right humerus    TONSILLECTOMY          Social History  The patient Meir Marroquin  reports that he quit smoking about 8 years ago. His smoking use included cigarettes. He has a 3.50 pack-year smoking history. He has quit using smokeless tobacco. He reports current alcohol use of about 7.0 standard drinks of alcohol per week. He reports that he does not use drugs.      Family History  Family History   Problem Relation Age of Onset    Cancer Father

## 2023-10-12 NOTE — BRIEF OP NOTE
Brief Postoperative Note      Patient: Antonieta Taylor  YOB: 1960  MRN: [id number]    Date of Procedure: 10/12/2023    Pre-Op Diagnosis: Umbilical Hernia. Post-Op Diagnosis:  Same. Procedure:   Repair Umbilical Hernia with Mesh. Surgeon(s):  Nanda Dancer, MD    Assistant:  * No surgical staff found *    Anesthesia: General    Estimated Blood Loss (mL): Approximately 5 ml. Complications: None    Specimens:   * No specimens in log *    Implants:  Implant Name Type Inv. Item Serial No.  Lot No. LRB No. Used Action   PATCH DON SM DIA1.7IN CIR W/ STRP [employer] ABSRB - SN/A  PATCH DON SM DIA1.7IN CIR W/ STRP [employer] ABSRB N/A BARD DAVOL-WD [id number] N/A 1 Implanted         Drains: * No LDAs found *    Findings: Umbilical hernia - less than 3 cm. Not incarcerated.        Electronically signed by Nanda Dancer, MD on 10/12/2023 at 9:29 AM

## 2023-10-12 NOTE — DISCHARGE INSTR - SUPPLEMENTARY INSTRUCTIONS
provided. Discharge medications reviewed with the patient and spouse   Derma morgan is a skin glue used for closing surgical wounds. Most surgical wounds also include sutures under the dermabond that dissolve and will not require removal.   You can shower when instructed by your physician. Gently wash with  soap and water and pat dry. Soaking the site in a tub is NOT RECOMMENDED. Do not scrub the site and DO NOT USE OINTMENTS OR LOTIONS on the site as it will cause the skin glue to breakdown faster than intended. The glue will dissolve on its own in approximately 10-14 days. Narcotics can sometimes cause constipation, so you may want to pickup a stool softener from your pharmacy when picking up your other prescriptions. A stool softener such as \"Docusate\" can be purchased at your local pharmacy and does not require a prescription. Once you are no longer taking the narcotic for pain you can discontinue the stool softener if no longer needed.      You received a Medication called Toradol at 9am similar to Ibuprofen if another dose is needed please take 6 hours from previous dose which will be 3pm      You received a Medication called Tylenol @ 7:15am  = next dose take after 1:15pm      You received a Medication for nausea called Zofran at 8:00 am  if another dose is needed please take 6 hours from previous dose which will be 2pm       Patient an difficult intubation -  which resulted in an Laryngospasm (manasa-RING-henok-miki-um)  which is a transient and reversible spasm of the vocal cords that temporarily makes it difficult to speak or breathe.    ___________________________________________________________________________________________________________________________________

## 2023-10-12 NOTE — ANESTHESIA POSTPROCEDURE EVALUATION
Department of Anesthesiology  Postprocedure Note    Patient: Cruz Powers  MRN: [id number]  YOB: 1960  Date of evaluation: 10/12/2023      Procedure Summary     Date: 10/12/23 Room / Location: [facility] OR R2 / [facility] OR    Anesthesia Start: [address] [facility] Anesthesia Stop: 4700    Procedure: REPAIR UMBILICAL HERNIA WITH MESH (Abdomen) Diagnosis:       Umbilical hernia without obstruction and without gangrene      (Umbilical hernia without obstruction and without gangrene [K42.9])    Surgeons: Grace Pabon MD Responsible Provider: Lakesha Urrutia MD    Anesthesia Type: general ASA Status: 3          Anesthesia Type: No value filed.     Loretta Phase I: Loretta Score: 10    Loretta Phase II:        Anesthesia Post Evaluation    Patient location during evaluation: PACU  Patient participation: complete - patient participated  Level of consciousness: awake and alert  Airway patency: patent  Nausea & Vomiting: no vomiting and no nausea  Complications: no  Cardiovascular status: hemodynamically stable  Respiratory status: acceptable  Hydration status: stable  Pain management: adequate

## 2023-10-12 NOTE — OP NOTE
that the hernia defect was less than 3 cm in length. The edges of the fascia were then mobilized such that a primary tension-free hernia repair could be completed. The wound was irrigated with saline and inspected and found to be hemostatic. Attention was then directed towards the repair. A small Ventralex patch was brought on the field after first soaking it in saline. The patch was positioned appropriately and was secured circumferentially with transfascial 0 Vicryl sutures. The fascial defect was then closed superiorly to inferiorly with interrupted 0 Vicryl figure-of-eight sutures. Care was taken to incorporate the mesh with the suture. The Marlex tails were excised and the remaining leaves split. The mesh and fascia were closed with another 0 Vicryl figure-of-eight suture. At completion, the fascial closure was felt to be under no undue tension. The wound was irrigated again with saline, inspected and found to be hemostatic. The umbilicus was reconnected to the fascia with a 2-0 Vicryl suture. The surgical incision was closed with two layers of running 2-0 Vicryl suture followed by 4-0 Monocryl subcuticular suture to the skin. Additional local anesthetic was infiltrated, and the incision was dressed with Dermabond. The patient was awakened from his general anesthetic and extubated in the operating room. He was transferred to the stretcher and brought to the recovery room in stable condition having tolerated the procedure well. At the conclusion of the procedure, all sponge counts, instrument counts, and needle counts were reported as correct x2.       Richard Kate MD      [id number]  D:  10/12/2023 9:38  T:  10/12/2023 15:26  JOB #:  [id number]  CC:  MD Jareth North NP

## 2023-10-12 NOTE — DISCHARGE INSTRUCTIONS
Patient Discharge Instructions    Eamon Hewitt / [id number] : 1960    Admitted 10/12/2023 Discharged: 10/12/2023       It is important that you take the medication exactly as they are prescribed. Keep your medication in the bottles provided by the pharmacist and keep a list of the medication names, dosages, and times to be taken in your wallet. Do not take other medications without consulting your doctor. What to do at Home    Recommended diet: As Directed. Recommended activity: No Heavy Lifting (Less Than 10-15 Pounds). No Driving While Taking Oxycodone. Tylenol 1000 mg every 6 hours for two days and then 1000 mg every 6 hours as needed for pain. Ice pack to abdomen as needed. Oxycodone as needed for severe pain. May Take Shower or [place] in 48 hours. If you experience any of the following symptoms Fevers, Chills, Nausea, Vomitting, Redness or Drainage at Surgical Site(s) or Any Other Questions or Concerns Please Call -  [phone]. Follow-up with Dr. Anastacio Godwin in 10-14 days. Information obtained by :  I understand that if any problems occur once I am at home I am to contact my physician. I understand and acknowledge receipt of the instructions indicated above.                                                                                                                                            Physician's or R.N.'s Signature                                                                  Date/Time                                                                                                                                              Patient or Representative Signature                                                          Date/Time

## 2023-10-12 NOTE — ANESTHESIA PRE PROCEDURE
Department of Anesthesiology  Preprocedure Note       Name:  Aarti Mckenzie   Age:  61 y.o.  :  1960                                          MRN:  [id number]         Date:  10/12/2023      Surgeon: Agustín Emerson):  Danial Castañeda MD    Procedure: Procedure(s):  REPAIR UMBILICAL HERNIA WITH POSSIBLE MESH    Medications prior to admission:   Prior to Admission medications    Medication Sig Start Date End Date Taking?  Authorizing Provider   fenofibrate (TRICOR) 145 MG tablet TAKE 1 TABLET BY MOUTH DAILY 23   Slick Goldman APRN - ALIZE   colchicine (COLCRYS) 0.6 MG tablet TAKE 1 TABLET BY MOUTH DAILY IF  SIGNS OF GOUT FLARE TAKE 2  TABLETS BY MOUTH AT 1 TIME THEN  1 TABLET BY MOUTH IN 1 HOUR) 23   Slick Goldman APRN - NP   rosuvastatin (CRESTOR) 40 MG tablet Take 1 tablet by mouth daily 23   ANJELICA Reeder NP   fenofibrate (TRICOR) 145 MG tablet Take 1 tablet by mouth daily 23   ANJELICA Reeder NP   allopurinol (ZYLOPRIM) 300 MG tablet Take 1 tablet by mouth daily 3/12/23   Automatic Reconciliation, Ar   diclofenac (VOLTAREN) 75 MG EC tablet Take 1 tablet by mouth 2 times daily  Patient not taking: Reported on 8/3/2023 12/1/22   Automatic Reconciliation, Ar   omeprazole (PRILOSEC) 20 MG delayed release capsule Take 1 capsule by mouth daily 23   Automatic Reconciliation, Ar   sildenafil (VIAGRA) 100 MG tablet Take 1 tablet by mouth as needed 17   Automatic Reconciliation, Ar   valsartan (DIOVAN) 320 MG tablet Take 1 tablet by mouth daily 23   Automatic Reconciliation, Ar   venlafaxine (EFFEXOR XR) 75 MG extended release capsule Take 2 capsules by mouth daily 23   Automatic Reconciliation, Ar   cetirizine (ZYRTEC) 10 MG tablet Take 1 tablet by mouth daily 3/28/22   Provider, MD Destiny   Icosapent Ethyl (VASCEPA) 1 g CAPS capsule TAKE 2 CAPSULES BY MOUTH  TWICE DAILY WITH MEALS 21   ProviderDestiny MD   testosterone cypionate (Amber Nailer

## 2023-10-12 NOTE — PERIOP NOTE
Patient meets discharge criteria. Patient and   Spouse provided with verbal and written discharge instructions. Patient discharged by wheelchair with Spouse to transport home.

## 2023-10-30 ENCOUNTER — OFFICE VISIT (OUTPATIENT)
Age: 63
End: 2023-10-30

## 2023-10-30 ENCOUNTER — TELEPHONE (OUTPATIENT)
Age: 63
End: 2023-10-30

## 2023-10-30 VITALS
SYSTOLIC BLOOD PRESSURE: 125 MMHG | RESPIRATION RATE: 18 BRPM | WEIGHT: 211.2 LBS | TEMPERATURE: 98.4 F | HEIGHT: 69 IN | BODY MASS INDEX: 31.28 KG/M2 | HEART RATE: 74 BPM | OXYGEN SATURATION: 95 % | DIASTOLIC BLOOD PRESSURE: 75 MMHG

## 2023-10-30 DIAGNOSIS — Z09 S/P UMBILICAL HERNIA REPAIR, FOLLOW-UP EXAM: ICD-10-CM

## 2023-10-30 DIAGNOSIS — Z09 POSTOPERATIVE EXAMINATION: Primary | ICD-10-CM

## 2023-10-30 PROCEDURE — 99024 POSTOP FOLLOW-UP VISIT: CPT

## 2023-10-30 ASSESSMENT — ANXIETY QUESTIONNAIRES
1. FEELING NERVOUS, ANXIOUS, OR ON EDGE: 0
GAD7 TOTAL SCORE: 0
4. TROUBLE RELAXING: 0
IF YOU CHECKED OFF ANY PROBLEMS ON THIS QUESTIONNAIRE, HOW DIFFICULT HAVE THESE PROBLEMS MADE IT FOR YOU TO DO YOUR WORK, TAKE CARE OF THINGS AT HOME, OR GET ALONG WITH OTHER PEOPLE: NOT DIFFICULT AT ALL
5. BEING SO RESTLESS THAT IT IS HARD TO SIT STILL: 0
7. FEELING AFRAID AS IF SOMETHING AWFUL MIGHT HAPPEN: 0
2. NOT BEING ABLE TO STOP OR CONTROL WORRYING: 0
6. BECOMING EASILY ANNOYED OR IRRITABLE: 0
3. WORRYING TOO MUCH ABOUT DIFFERENT THINGS: 0

## 2023-10-30 ASSESSMENT — PATIENT HEALTH QUESTIONNAIRE - PHQ9
SUM OF ALL RESPONSES TO PHQ QUESTIONS 1-9: 0
SUM OF ALL RESPONSES TO PHQ9 QUESTIONS 1 & 2: 0
SUM OF ALL RESPONSES TO PHQ QUESTIONS 1-9: 0
1. LITTLE INTEREST OR PLEASURE IN DOING THINGS: 0
2. FEELING DOWN, DEPRESSED OR HOPELESS: 0

## 2023-10-30 NOTE — TELEPHONE ENCOUNTER
Attempted to contact patient to see if he would be ok with seeing the NP vs Dr. Margie Weber today. No answer, left voicemail for a return call.

## 2023-12-11 RX ORDER — ICOSAPENT ETHYL 1000 MG/1
CAPSULE ORAL
Qty: 360 CAPSULE | Refills: 3 | Status: SHIPPED | OUTPATIENT
Start: 2023-12-11

## 2023-12-11 NOTE — TELEPHONE ENCOUNTER
PCP: ANJELICA Reeder NP    Last appt: 6/14/2023   Future Appointments   Date Time Provider [address]   12/14/2023  8:45 AM ANJELCIA Reeder NP PA[facility] BS AMB   8/16/2024  8:00 AM BSASMITA RICHARD ECHO 1 RUSTAM MAGUIRE AMB   8/16/2024  9:00 AM MD RUSTAM Mccain BS AMB       Requested Prescriptions     Pending Prescriptions Disp Refills    Icosapent Ethyl (VASCEPA) 1 g CAPS capsule [Pharmacy Med Name: Icosapent Ethyl 1 GM Oral Capsule] 360 capsule 3     Sig: TAKE 2 CAPSULES BY MOUTH TWICE  DAILY WITH MEALS         Prior labs and Blood pressures:  BP Readings from Last 3 Encounters:   10/30/23 125/75   10/12/23 117/61   10/10/23 121/70     Lab Results   Component Value Date/Time     06/15/2023 07:25 AM    K 4.1 06/15/2023 07:25 AM     06/15/2023 07:25 AM    CO2 28 06/15/2023 07:25 AM    BUN 27 06/15/2023 07:25 AM    GFRAA >60 07/11/2022 02:03 PM     Lab Results   Component Value Date/Time    LXL2FQZR 6.2 06/14/2023 09:03 AM     Lab Results   Component Value Date/Time    CHOL 145 08/08/2023 08:24 AM    CHOL 131 12/09/2020 10:33 AM    HDL 30 08/08/2023 08:24 AM     No results found for: \"VITD3\", \"VD3RIA\"    Lab Results   Component Value Date/Time    TSH 2.190 06/03/2019 10:15 AM

## 2024-01-03 NOTE — Clinical Note
Ongoing Note    Writer spoke with (Cyndie) admissions at [facility], facility can accept patient pending insurance authorization. Writer spoke with patient spouse (Alexandre) [phone] provide update that auth is pending for [facility], once approved writer will coordinate discharge.     SSA will coordinate pt transfer to the SNF when Insurance authorization is obtained.    (Continued)  Writer spoke with (Cyndie) admissions at [facility], patient insurance auth is approved and bed is ready.     BUD/GUNNAR Discharge Plan  Informed patient is ready for discharge.  Patient to be picked up by [facility] at 1700. Patient/interested person has been counseled for post hospitalization care.  Patient agrees and understands goals and plan. Initial implementation of the patient’s discharge plan has been arranged, including any devices/equipment needed for discharge. Discharge plan communicated to MD, RN, VAMSHI, BUD, Receiving Facility/Agency, and Patient spouse Alexandre .    Patient’s discharge destination is Rehabilitation/Skilled Care.    Selected Continued Care - Admitted Since 12/20/2023       Destination  Coordination complete.      Service Provider Selected Services Address Phone Fax    [facility] [address], [facility] [address] [phone]                  RN Report Number  [phone]       Can we check his [facility] records for colonscopy dates?

## 2024-01-15 ENCOUNTER — PATIENT MESSAGE (OUTPATIENT)
Age: 64
End: 2024-01-15

## 2024-01-15 DIAGNOSIS — R73.03 PREDIABETES: ICD-10-CM

## 2024-01-15 DIAGNOSIS — R93.1 ELEVATED CORONARY ARTERY CALCIUM SCORE: ICD-10-CM

## 2024-01-15 DIAGNOSIS — I10 ESSENTIAL HYPERTENSION: Primary | ICD-10-CM

## 2024-01-15 DIAGNOSIS — Z87.39 HISTORY OF GOUT: ICD-10-CM

## 2024-01-15 DIAGNOSIS — E78.1 HYPERTRIGLYCERIDEMIA: ICD-10-CM

## 2024-01-15 DIAGNOSIS — Z11.59 NEED FOR HEPATITIS B SCREENING TEST: ICD-10-CM

## 2024-02-06 DIAGNOSIS — R93.1 ELEVATED CORONARY ARTERY CALCIUM SCORE: ICD-10-CM

## 2024-02-06 DIAGNOSIS — E78.1 HYPERTRIGLYCERIDEMIA: ICD-10-CM

## 2024-02-07 NOTE — TELEPHONE ENCOUNTER
PCP: Amy Goldman APRN - NP    Last appt: 6/14/2023   Future Appointments   Date Time Provider Department Center   2/9/2024 11:00 AM Amy Goldman APRN - NP PAFANA BS AMB   8/16/2024  8:00 AM BSASMITA RICHARD ECHO 1 RUSTAM BS AMB   8/16/2024  9:00 AM Amparo Meyers MD CAVREY BS AMB       Requested Prescriptions     Pending Prescriptions Disp Refills    rosuvastatin (CRESTOR) 40 MG tablet [Pharmacy Med Name: Rosuvastatin Calcium 40 MG Oral Tablet] 90 tablet 3     Sig: TAKE 1 TABLET BY MOUTH DAILY    allopurinol (ZYLOPRIM) 300 MG tablet [Pharmacy Med Name: Allopurinol 300 MG Oral Tablet] 90 tablet 3     Sig: TAKE 1 TABLET BY MOUTH DAILY    valsartan (DIOVAN) 320 MG tablet [Pharmacy Med Name: Valsartan 320 MG Oral Tablet] 90 tablet 3     Sig: TAKE 1 TABLET BY MOUTH DAILY         Prior labs and Blood pressures:  BP Readings from Last 3 Encounters:   10/30/23 125/75   10/12/23 117/61   10/10/23 121/70     Lab Results   Component Value Date/Time     06/15/2023 07:25 AM    K 4.1 06/15/2023 07:25 AM     06/15/2023 07:25 AM    CO2 28 06/15/2023 07:25 AM    BUN 27 06/15/2023 07:25 AM    GFRAA >60 07/11/2022 02:03 PM     Lab Results   Component Value Date/Time    [id number] 6.2 06/14/2023 09:03 AM     Lab Results   Component Value Date/Time    CHOL 145 08/08/2023 08:24 AM    CHOL 131 12/09/2020 10:33 AM    HDL 30 08/08/2023 08:24 AM     No results found for: \"VITD3\", \"VD3RIA\"    Lab Results   Component Value Date/Time    TSH 2.190 06/03/2019 10:15 AM

## 2024-02-09 DIAGNOSIS — E78.1 HYPERTRIGLYCERIDEMIA: ICD-10-CM

## 2024-02-09 DIAGNOSIS — R93.1 ELEVATED CORONARY ARTERY CALCIUM SCORE: ICD-10-CM

## 2024-02-09 RX ORDER — VALSARTAN 320 MG/1
320 TABLET ORAL DAILY
Qty: 90 TABLET | Refills: 0 | Status: SHIPPED | OUTPATIENT
Start: 2024-02-09

## 2024-02-09 RX ORDER — ROSUVASTATIN CALCIUM 40 MG/1
40 TABLET, COATED ORAL DAILY
Qty: 90 TABLET | Refills: 0 | OUTPATIENT
Start: 2024-02-09

## 2024-02-09 RX ORDER — VALSARTAN 320 MG/1
320 TABLET ORAL DAILY
Qty: 90 TABLET | Refills: 0 | OUTPATIENT
Start: 2024-02-09

## 2024-02-09 RX ORDER — ROSUVASTATIN CALCIUM 40 MG/1
40 TABLET, COATED ORAL DAILY
Qty: 90 TABLET | Refills: 0 | Status: SHIPPED | OUTPATIENT
Start: 2024-02-09

## 2024-02-09 RX ORDER — ALLOPURINOL 300 MG/1
300 TABLET ORAL DAILY
Qty: 90 TABLET | Refills: 0 | Status: SHIPPED | OUTPATIENT
Start: 2024-02-09

## 2024-02-09 RX ORDER — ALLOPURINOL 300 MG/1
300 TABLET ORAL DAILY
Qty: 90 TABLET | Refills: 0 | OUTPATIENT
Start: 2024-02-09

## 2024-02-09 NOTE — TELEPHONE ENCOUNTER
Patient needs refills below.  Has appt scheduled for 3/7/24.  Karyna Dilma    Last appointment: 6/14/23 Jones  Next appointment: 3/7/24 Jones  Previous refill encounter(s):   Rosuvastatin 6/14/23 90 + 1  Allopurinol 3/12/23 90 + 3  Valsartan 2/166/23 90 + 3    Requested Prescriptions     Pending Prescriptions Disp Refills    rosuvastatin (CRESTOR) 40 MG tablet 90 tablet 0     Sig: Take 1 tablet by mouth daily    allopurinol (ZYLOPRIM) 300 MG tablet 90 tablet 0     Sig: Take 1 tablet by mouth daily    valsartan (DIOVAN) 320 MG tablet 90 tablet 0     Sig: Take 1 tablet by mouth daily     For Pharmacy Admin Tracking Only    Program: Medication Refill  CPA in place:    Recommendation Provided To:   Intervention Detail: New Rx: 3, reason: Patient Preference  Intervention Accepted By:   Gap Closed?:    Time Spent (min): 5

## 2024-02-27 DIAGNOSIS — E78.1 HYPERTRIGLYCERIDEMIA: ICD-10-CM

## 2024-02-27 DIAGNOSIS — Z87.39 HISTORY OF GOUT: ICD-10-CM

## 2024-02-27 DIAGNOSIS — I10 ESSENTIAL HYPERTENSION: ICD-10-CM

## 2024-02-27 DIAGNOSIS — R73.03 PREDIABETES: ICD-10-CM

## 2024-02-27 DIAGNOSIS — Z11.59 NEED FOR HEPATITIS B SCREENING TEST: ICD-10-CM

## 2024-02-27 LAB
ALBUMIN SERPL-MCNC: 3.3 G/DL (ref 3.5–5)
ALBUMIN/GLOB SERPL: ABNORMAL (ref 1.1–2.2)
ALP SERPL-CCNC: 76 U/L (ref 45–117)
ALT SERPL-CCNC: ABNORMAL U/L (ref 12–78)
ANION GAP SERPL CALC-SCNC: 6 MMOL/L (ref 5–15)
AST SERPL-CCNC: ABNORMAL U/L (ref 15–37)
BILIRUB SERPL-MCNC: 1 MG/DL (ref 0.2–1)
BUN SERPL-MCNC: 7 MG/DL (ref 6–20)
BUN/CREAT SERPL: 7 (ref 12–20)
CALCIUM SERPL-MCNC: 8 MG/DL (ref 8.5–10.1)
CHLORIDE SERPL-SCNC: 98 MMOL/L (ref 97–108)
CO2 SERPL-SCNC: 28 MMOL/L (ref 21–32)
CREAT SERPL-MCNC: 0.98 MG/DL (ref 0.7–1.3)
EST. AVERAGE GLUCOSE BLD GHB EST-MCNC: 137 MG/DL
GLOBULIN SER CALC-MCNC: ABNORMAL G/DL (ref 2–4)
GLUCOSE SERPL-MCNC: 259 MG/DL (ref 65–100)
HBA1C MFR BLD: 6.4 % (ref 4–5.6)
HBV SURFACE AB SER QL: NONREACTIVE
HBV SURFACE AB SER-ACNC: <3.1 MIU/ML
HBV SURFACE AG SER QL: <0.1 INDEX
HBV SURFACE AG SER QL: NEGATIVE
POTASSIUM SERPL-SCNC: 3.4 MMOL/L (ref 3.5–5.1)
PROT SERPL-MCNC: ABNORMAL G/DL (ref 6.4–8.2)
SODIUM SERPL-SCNC: 132 MMOL/L (ref 136–145)
URATE SERPL-MCNC: 5.9 MG/DL (ref 3.5–7.2)

## 2024-02-28 LAB — HBV CORE AB SERPL QL IA: NEGATIVE

## 2024-02-28 RX ORDER — ALLOPURINOL 300 MG/1
300 TABLET ORAL DAILY
Qty: 90 TABLET | Refills: 0 | Status: SHIPPED | OUTPATIENT
Start: 2024-02-28

## 2024-02-28 RX ORDER — VALSARTAN 320 MG/1
320 TABLET ORAL DAILY
Qty: 90 TABLET | Refills: 0 | Status: SHIPPED | OUTPATIENT
Start: 2024-02-28

## 2024-02-28 NOTE — TELEPHONE ENCOUNTER
PCP: Amy Goldman APRN - NP    Last appt: 6/14/2023   Future Appointments   Date Time Provider Department Center   3/7/2024  9:30 AM Amy Goldman APRN - NP PA[facility] BS AMB   8/16/2024  8:00 AM BSC RICHARD ECHO 1 CAROLYNREY BS AMB   8/16/2024  9:00 AM Amparo Meyers MD C[facility]REY BS AMB       Requested Prescriptions     Pending Prescriptions Disp Refills    allopurinol (ZYLOPRIM) 300 MG tablet 90 tablet 0     Sig: Take 1 tablet by mouth daily    valsartan (DIOVAN) 320 MG tablet 90 tablet 0     Sig: Take 1 tablet by mouth daily         Prior labs and Blood pressures:  BP Readings from Last 3 Encounters:   10/30/23 125/75   10/12/23 117/61   10/10/23 121/70     Lab Results   Component Value Date/Time     02/27/2024 08:39 AM    K 3.4 02/27/2024 08:39 AM    CL 98 02/27/2024 08:39 AM    CO2 28 02/27/2024 08:39 AM    BUN 7 02/27/2024 08:39 AM    GFRAA >60 07/11/2022 02:03 PM     Lab Results   Component Value Date/Time    BWS3BCIU 6.2 06/14/2023 09:03 AM     Lab Results   Component Value Date/Time    CHOL 145 08/08/2023 08:24 AM    CHOL 131 12/09/2020 10:33 AM    HDL 30 08/08/2023 08:24 AM     No results found for: \"VITD3\", \"VD3RIA\"    Lab Results   Component Value Date/Time    TSH 2.190 06/03/2019 10:15 AM

## 2024-03-07 ENCOUNTER — OFFICE VISIT (OUTPATIENT)
Age: 64
End: 2024-03-07
Payer: COMMERCIAL

## 2024-03-07 VITALS
WEIGHT: 214.4 LBS | RESPIRATION RATE: 16 BRPM | HEART RATE: 90 BPM | DIASTOLIC BLOOD PRESSURE: 67 MMHG | BODY MASS INDEX: 31.76 KG/M2 | OXYGEN SATURATION: 98 % | TEMPERATURE: 97.1 F | SYSTOLIC BLOOD PRESSURE: 110 MMHG | HEIGHT: 69 IN

## 2024-03-07 DIAGNOSIS — Z23 NEED FOR HEPATITIS A AND B VACCINATION: ICD-10-CM

## 2024-03-07 DIAGNOSIS — E78.1 HYPERTRIGLYCERIDEMIA: ICD-10-CM

## 2024-03-07 DIAGNOSIS — Z87.39 HISTORY OF GOUT: ICD-10-CM

## 2024-03-07 DIAGNOSIS — I10 ESSENTIAL HYPERTENSION: Primary | ICD-10-CM

## 2024-03-07 DIAGNOSIS — R93.1 ELEVATED CORONARY ARTERY CALCIUM SCORE: ICD-10-CM

## 2024-03-07 DIAGNOSIS — E87.6 HYPOKALEMIA: ICD-10-CM

## 2024-03-07 DIAGNOSIS — R73.03 PREDIABETES: ICD-10-CM

## 2024-03-07 LAB
ALBUMIN/GLOB SERPL: 1.2 (ref 1.1–2.2)
ALP SERPL-CCNC: 60 U/L (ref 45–117)
ALT SERPL-CCNC: 79 U/L (ref 12–78)
ANION GAP SERPL CALC-SCNC: 9 MMOL/L (ref 5–15)
AST SERPL-CCNC: 16 U/L (ref 15–37)
BILIRUB SERPL-MCNC: 0.4 MG/DL (ref 0.2–1)
BUN SERPL-MCNC: 26 MG/DL (ref 6–20)
CALCIUM SERPL-MCNC: 10.2 MG/DL (ref 8.5–10.1)
CHLORIDE SERPL-SCNC: 99 MMOL/L (ref 97–108)
CHOLEST SERPL-MCNC: 216 MG/DL
CO2 SERPL-SCNC: 28 MMOL/L (ref 21–32)
CREAT SERPL-MCNC: 1.24 MG/DL (ref 0.7–1.3)
GLOBULIN SER CALC-MCNC: 3.5 G/DL (ref 2–4)
GLUCOSE SERPL-MCNC: 166 MG/DL (ref 65–100)
HDLC SERPL-MCNC: 28 MG/DL
HDLC SERPL: 7.7 (ref 0–5)
LDLC SERPL DIRECT ASSAY-MCNC: 62 MG/DL (ref 0–100)
POTASSIUM SERPL-SCNC: 4.1 MMOL/L (ref 3.5–5.1)
PROT SERPL-MCNC: 7.6 G/DL (ref 6.4–8.2)
SODIUM SERPL-SCNC: 136 MMOL/L (ref 136–145)
TRIGL SERPL-MCNC: 916 MG/DL

## 2024-03-07 PROCEDURE — 90632 HEPA VACCINE ADULT IM: CPT | Performed by: NURSE PRACTITIONER

## 2024-03-07 PROCEDURE — 90739 HEPB VACC 2/4 DOSE ADULT IM: CPT | Performed by: NURSE PRACTITIONER

## 2024-03-07 PROCEDURE — 3074F SYST BP LT 130 MM HG: CPT | Performed by: NURSE PRACTITIONER

## 2024-03-07 PROCEDURE — 99214 OFFICE O/P EST MOD 30 MIN: CPT | Performed by: NURSE PRACTITIONER

## 2024-03-07 PROCEDURE — 90471 IMMUNIZATION ADMIN: CPT | Performed by: NURSE PRACTITIONER

## 2024-03-07 PROCEDURE — 3078F DIAST BP <80 MM HG: CPT | Performed by: NURSE PRACTITIONER

## 2024-03-07 PROCEDURE — 90472 IMMUNIZATION ADMIN EACH ADD: CPT | Performed by: NURSE PRACTITIONER

## 2024-03-07 RX ORDER — FENOFIBRATE 145 MG/1
145 TABLET, COATED ORAL DAILY
Qty: 90 TABLET | Refills: 1 | Status: SHIPPED | OUTPATIENT
Start: 2024-03-07

## 2024-03-07 ASSESSMENT — PATIENT HEALTH QUESTIONNAIRE - PHQ9
2. FEELING DOWN, DEPRESSED OR HOPELESS: 0
SUM OF ALL RESPONSES TO PHQ9 QUESTIONS 1 & 2: 0
SUM OF ALL RESPONSES TO PHQ QUESTIONS 1-9: 0
SUM OF ALL RESPONSES TO PHQ QUESTIONS 1-9: 0
1. LITTLE INTEREST OR PLEASURE IN DOING THINGS: 0
SUM OF ALL RESPONSES TO PHQ QUESTIONS 1-9: 0
SUM OF ALL RESPONSES TO PHQ QUESTIONS 1-9: 0

## 2024-03-07 NOTE — PROGRESS NOTES
Chief Complaint   Patient presents with    Diabetes    Hypertension    Cholesterol Problem       \"Have you been to the ER, urgent care clinic since your last visit?  Hospitalized since your last visit?\"    YES - When: approximately 3 months ago.  [facility]    “Have you seen or consulted any other health care providers outside of [facility] since your last visit?”    NO    “Have you had a colorectal cancer screening such as a colonoscopy/FIT/Cologuard?    NO             3/7/2024     9:33 AM   PHQ-9    Little interest or pleasure in doing things 0   Feeling down, depressed, or hopeless 0   PHQ-2 Score 0   PHQ-9 Total Score 0       Health Maintenance Due   Topic Date Due    Pneumococcal 0-64 years Vaccine (1 - PCV) Never done    Diabetic retinal exam  Never done    Hepatitis A vaccine (1 of 2 - Risk 2-dose series) Never done    DTaP/Tdap/Td vaccine (1 - Tdap) Never done    Hepatitis B vaccine (1 of 3 - Risk 3-dose series) Never done    Respiratory Syncytial Virus (RSV) Pregnant or age 60 yrs+ (1 - 1-dose 60+ series) Never done    Flu vaccine (1) 08/01/2023    Colorectal Cancer Screen  09/26/2023       
Encounters:   03/07/24 97.3 kg (214 lb 6.4 oz)   10/30/23 95.8 kg (211 lb 3.2 oz)   10/11/23 95.3 kg (210 lb)     Temp Readings from Last 3 Encounters:   03/07/24 97.1 °F (36.2 °C) (Infrared)   10/30/23 98.4 °F (36.9 °C) (Oral)   10/12/23 98.7 °F (37.1 °C) (Oral)     BP Readings from Last 3 Encounters:   03/07/24 110/67   10/30/23 125/75   10/12/23 117/61     Pulse Readings from Last 3 Encounters:   03/07/24 90   10/30/23 74   10/12/23 93           PHYSICAL EXAMINATION:       General: Alert, cooperative, no distress  Respiratory: Breathing comfortably, in no acute respiratory distress. Clear to auscultation bilaterally.   Cardiovascular: Regular rate and rhythm, S1, S2 normal, no murmur, click, rub or gallop.   Extremities: no edema.   Abdomen: Soft, non-tender, not distended. Bowel sounds normal. No masses or organomegaly.  MSK: Extremities normal appearing, atraumatic, no effusion. Gait steady and unassisted.   Skin: Skin color, texture, turgor normal. No rashes or lesions on exposed skin.  Neurologic: A/Ox3  Psychiatric: Normal affect. Mood euthymic. Thoughts logical. Speech volume and speed normal            Treatment risks/benefits/costs/interactions/alternatives discussed with patient.  Advised patient to call back or return to office if symptoms worsen/change/persist. If patient cannot reach us or should anything more severe/urgent arise he/she should proceed directly to the nearest emergency department.  Discussed expected course/resolution/complications of diagnosis in detail with patient.  Patient expressed understanding with the diagnosis and plan.     An electronic signature was used to authenticate this note.  -- ANJELICA Loza - NP

## 2024-03-07 NOTE — PATIENT INSTRUCTIONS
[facility]  [address] [place] [id number] [phone]  Fax [phone]    [facility]  [address], [place] [address] [phone]

## 2024-03-12 ENCOUNTER — PATIENT MESSAGE (OUTPATIENT)
Age: 64
End: 2024-03-12

## 2024-03-12 DIAGNOSIS — E87.1 HYPONATREMIA: Primary | ICD-10-CM

## 2024-03-12 DIAGNOSIS — D72.829 LEUKOCYTOSIS, UNSPECIFIED TYPE: ICD-10-CM

## 2024-03-21 ENCOUNTER — HOSPITAL ENCOUNTER (EMERGENCY)
Facility: HOSPITAL | Age: 64
Discharge: HOME | End: 2024-03-21
Attending: STUDENT IN AN ORGANIZED HEALTH CARE EDUCATION/TRAINING PROGRAM
Payer: COMMERCIAL

## 2024-03-21 ENCOUNTER — APPOINTMENT (OUTPATIENT)
Facility: HOSPITAL | Age: 64
End: 2024-03-21
Payer: COMMERCIAL

## 2024-03-21 VITALS
DIASTOLIC BLOOD PRESSURE: 65 MMHG | BODY MASS INDEX: 32.24 KG/M2 | OXYGEN SATURATION: 90 % | HEIGHT: 68 IN | RESPIRATION RATE: 17 BRPM | HEART RATE: 86 BPM | TEMPERATURE: 97.6 F | SYSTOLIC BLOOD PRESSURE: 131 MMHG | WEIGHT: 212.74 LBS

## 2024-03-21 DIAGNOSIS — J44.1 COPD EXACERBATION (HCC): Primary | ICD-10-CM

## 2024-03-21 LAB
ALBUMIN SERPL-MCNC: 3.6 G/DL (ref 3.5–5)
ALBUMIN/GLOB SERPL: 0.8 (ref 1.1–2.2)
ALP SERPL-CCNC: 43 U/L (ref 45–117)
ALT SERPL-CCNC: 55 U/L (ref 12–78)
ANION GAP SERPL CALC-SCNC: 11 MMOL/L (ref 5–15)
AST SERPL-CCNC: 28 U/L (ref 15–37)
BASOPHILS # BLD: 0 K/UL (ref 0–0.1)
BASOPHILS NFR BLD: 0 % (ref 0–1)
BILIRUB SERPL-MCNC: 0.4 MG/DL (ref 0.2–1)
BUN SERPL-MCNC: 20 MG/DL (ref 6–20)
BUN/CREAT SERPL: 13 (ref 12–20)
CALCIUM SERPL-MCNC: 9.2 MG/DL (ref 8.5–10.1)
CHLORIDE SERPL-SCNC: 95 MMOL/L (ref 97–108)
CO2 SERPL-SCNC: 28 MMOL/L (ref 21–32)
CREAT SERPL-MCNC: 1.51 MG/DL (ref 0.7–1.3)
DIFFERENTIAL METHOD BLD: ABNORMAL
EKG ATRIAL RATE: 78 BPM
EKG DIAGNOSIS: NORMAL
EKG P AXIS: 84 DEGREES
EKG P-R INTERVAL: 190 MS
EKG Q-T INTERVAL: 352 MS
EKG QRS DURATION: 90 MS
EKG QTC CALCULATION (BAZETT): 401 MS
EKG R AXIS: 0 DEGREES
EKG T AXIS: 52 DEGREES
EKG VENTRICULAR RATE: 78 BPM
EOSINOPHIL # BLD: 0.1 K/UL (ref 0–0.4)
EOSINOPHIL NFR BLD: 1 % (ref 0–7)
ERYTHROCYTE [DISTWIDTH] IN BLOOD BY AUTOMATED COUNT: 12.9 % (ref 11.5–14.5)
FLUAV AG NPH QL IA: NEGATIVE
FLUBV AG NOSE QL IA: NEGATIVE
GLOBULIN SER CALC-MCNC: 4.6 G/DL (ref 2–4)
GLUCOSE SERPL-MCNC: 187 MG/DL (ref 65–100)
HCT VFR BLD AUTO: 34.4 % (ref 36.6–50.3)
HGB BLD-MCNC: 12 G/DL (ref 12.1–17)
IMM GRANULOCYTES # BLD AUTO: 0.1 K/UL (ref 0–0.04)
IMM GRANULOCYTES NFR BLD AUTO: 1 % (ref 0–0.5)
LIPASE SERPL-CCNC: 148 U/L (ref 13–75)
LYMPHOCYTES # BLD: 2.6 K/UL (ref 0.8–3.5)
LYMPHOCYTES NFR BLD: 34 % (ref 12–49)
MAGNESIUM SERPL-MCNC: 1.7 MG/DL (ref 1.6–2.4)
MCH RBC QN AUTO: 34.7 PG (ref 26–34)
MCHC RBC AUTO-ENTMCNC: 34.9 G/DL (ref 30–36.5)
MCV RBC AUTO: 99.4 FL (ref 80–99)
MONOCYTES # BLD: 0.6 K/UL (ref 0–1)
MONOCYTES NFR BLD: 8 % (ref 5–13)
NEUTS SEG # BLD: 4.3 K/UL (ref 1.8–8)
NEUTS SEG NFR BLD: 56 % (ref 32–75)
NRBC # BLD: 0 K/UL (ref 0–0.01)
NRBC BLD-RTO: 0 PER 100 WBC
PLATELET # BLD AUTO: 124 K/UL (ref 150–400)
PMV BLD AUTO: 10.9 FL (ref 8.9–12.9)
POTASSIUM SERPL-SCNC: 3.8 MMOL/L (ref 3.5–5.1)
PROT SERPL-MCNC: 8.2 G/DL (ref 6.4–8.2)
RBC # BLD AUTO: 3.46 M/UL (ref 4.1–5.7)
SARS-COV-2 RDRP RESP QL NAA+PROBE: NOT DETECTED
SODIUM SERPL-SCNC: 134 MMOL/L (ref 136–145)
SOURCE: NORMAL
TROPONIN I SERPL HS-MCNC: 23 NG/L (ref 0–76)
WBC # BLD AUTO: 7.7 K/UL (ref 4.1–11.1)

## 2024-03-21 PROCEDURE — 6370000000 HC RX 637 (ALT 250 FOR IP): Performed by: STUDENT IN AN ORGANIZED HEALTH CARE EDUCATION/TRAINING PROGRAM

## 2024-03-21 PROCEDURE — 87804 INFLUENZA ASSAY W/OPTIC: CPT

## 2024-03-21 PROCEDURE — 83735 ASSAY OF MAGNESIUM: CPT

## 2024-03-21 PROCEDURE — 87635 SARS-COV-2 COVID-19 AMP PRB: CPT

## 2024-03-21 PROCEDURE — 96375 TX/PRO/DX INJ NEW DRUG ADDON: CPT

## 2024-03-21 PROCEDURE — 84484 ASSAY OF TROPONIN QUANT: CPT

## 2024-03-21 PROCEDURE — 83690 ASSAY OF LIPASE: CPT

## 2024-03-21 PROCEDURE — 96374 THER/PROPH/DIAG INJ IV PUSH: CPT

## 2024-03-21 PROCEDURE — 93005 ELECTROCARDIOGRAM TRACING: CPT | Performed by: STUDENT IN AN ORGANIZED HEALTH CARE EDUCATION/TRAINING PROGRAM

## 2024-03-21 PROCEDURE — 80053 COMPREHEN METABOLIC PANEL: CPT

## 2024-03-21 PROCEDURE — 99285 EMERGENCY DEPT VISIT HI MDM: CPT

## 2024-03-21 PROCEDURE — 71046 X-RAY EXAM CHEST 2 VIEWS: CPT

## 2024-03-21 PROCEDURE — 6360000002 HC RX W HCPCS: Performed by: STUDENT IN AN ORGANIZED HEALTH CARE EDUCATION/TRAINING PROGRAM

## 2024-03-21 PROCEDURE — 2580000003 HC RX 258: Performed by: STUDENT IN AN ORGANIZED HEALTH CARE EDUCATION/TRAINING PROGRAM

## 2024-03-21 PROCEDURE — 85025 COMPLETE CBC W/AUTO DIFF WBC: CPT

## 2024-03-21 PROCEDURE — 36415 COLL VENOUS BLD VENIPUNCTURE: CPT

## 2024-03-21 RX ORDER — ONDANSETRON 2 MG/ML
4 INJECTION INTRAMUSCULAR; INTRAVENOUS ONCE
Status: COMPLETED | OUTPATIENT
Start: 2024-03-21 | End: 2024-03-21

## 2024-03-21 RX ORDER — IPRATROPIUM BROMIDE AND ALBUTEROL SULFATE 2.5; .5 MG/3ML; MG/3ML
1 SOLUTION RESPIRATORY (INHALATION) EVERY 4 HOURS PRN
Qty: 360 ML | Refills: 0 | Status: SHIPPED | OUTPATIENT
Start: 2024-03-21

## 2024-03-21 RX ORDER — IPRATROPIUM BROMIDE AND ALBUTEROL SULFATE 2.5; .5 MG/3ML; MG/3ML
1 SOLUTION RESPIRATORY (INHALATION) EVERY 20 MIN
Status: COMPLETED | OUTPATIENT
Start: 2024-03-21 | End: 2024-03-21

## 2024-03-21 RX ORDER — PREDNISONE 20 MG/1
40 TABLET ORAL DAILY
Qty: 10 TABLET | Refills: 0 | Status: SHIPPED | OUTPATIENT
Start: 2024-03-22 | End: 2024-03-27

## 2024-03-21 RX ORDER — ALBUTEROL SULFATE 90 UG/1
2 AEROSOL, METERED RESPIRATORY (INHALATION) EVERY 6 HOURS PRN
Qty: 18 G | Refills: 0 | Status: SHIPPED | OUTPATIENT
Start: 2024-03-21

## 2024-03-21 RX ADMIN — WATER 125 MG: 1 INJECTION INTRAMUSCULAR; INTRAVENOUS; SUBCUTANEOUS at 07:57

## 2024-03-21 RX ADMIN — IPRATROPIUM BROMIDE AND ALBUTEROL SULFATE 1 DOSE: .5; 3 SOLUTION RESPIRATORY (INHALATION) at 07:56

## 2024-03-21 RX ADMIN — IPRATROPIUM BROMIDE AND ALBUTEROL SULFATE 1 DOSE: .5; 3 SOLUTION RESPIRATORY (INHALATION) at 08:20

## 2024-03-21 RX ADMIN — ONDANSETRON 4 MG: 2 INJECTION INTRAMUSCULAR; INTRAVENOUS at 08:04

## 2024-03-21 RX ADMIN — IPRATROPIUM BROMIDE AND ALBUTEROL SULFATE 1 DOSE: .5; 3 SOLUTION RESPIRATORY (INHALATION) at 08:05

## 2024-03-21 ASSESSMENT — PAIN - FUNCTIONAL ASSESSMENT: PAIN_FUNCTIONAL_ASSESSMENT: 0-10

## 2024-03-21 ASSESSMENT — LIFESTYLE VARIABLES
HOW OFTEN DO YOU HAVE A DRINK CONTAINING ALCOHOL: 2-4 TIMES A MONTH
HOW MANY STANDARD DRINKS CONTAINING ALCOHOL DO YOU HAVE ON A TYPICAL DAY: 1 OR 2

## 2024-03-21 ASSESSMENT — PAIN SCALES - GENERAL: PAINLEVEL_OUTOF10: 6

## 2024-03-21 NOTE — ED NOTES
Bedside and Verbal shift change report given to Haley (oncoming nurse) by Daylin (offgoing nurse). Report included the following information Nurse Handoff Report, ED Encounter Summary, ED SBAR, Cardiac Rhythm NSR, and Neuro Assessment.

## 2024-03-21 NOTE — ED TRIAGE NOTES
Goal Outcome Evaluation:  Plan of Care Reviewed With: patient  Progress: no change  Outcome Summary: VSS, remains on 2LNC. patient has needed prn Percocet Q3 to keep pain manageable. Does well standing from a sitting position. Has good urine output, good PO intake. Dressings all C/D/I. Safety mantained.   Patient stated that he thinks he has bronchitis, has had problems since Saturday, had virtual visit with MD on Tuesday and started on Zpack. Last night he stated that his chest was \"gurgling.\" Admits to chest pain on the right side, denies nausea/radiation/diaphoresis. Was able to cough up some brown sputum yesterday.

## 2024-03-21 NOTE — ED PROVIDER NOTES
[facility]  EMERGENCY DEPARTMENT ENCOUNTER      Pt Name: Brigido Pardo  MRN: [id number]  Birthdate 1960  Date of evaluation: 3/21/2024  Provider: Nava Iraheta DO    CHIEF COMPLAINT     No chief complaint on file.      PMH   Past Medical History:   Diagnosis Date    Anxiety     Gout     Hypercholesterolemia     Hypertension     Umbilical hernia without obstruction and without gangrene 8/2/2022         MDM:   Vitals:    Vitals:    03/21/24 0930   BP:    Pulse: 86   Resp: 17   Temp:    SpO2: 90%           This is a 63 y.o. male with pmhx COPD, GERD, HTN, HLD who presents today for cc of wheezing.  Patient states that over the last few days he has had some worsening cough, nonproductive, associated with wheezing.  He states that he has this happen a few times a year and he usually takes his inhaler and was given some steroids and a Z-Jeremy.  He was seen in urgent care and was given some antibiotics but has run out of his inhaler did not take any steroids.  He states that he thinks that he needs a breathing treatments.  He denies any fever or chills.  He did complain of some chest tightness described as \"lung pain\" which is typical of his COPD exacerbations, no overt chest pain, and has lasted the last few days. Rates it 6/10, tight, like a band across the chest, nonradiating. No dyspnea or leg swelling.     On arrival VS stable.   Physical Exam  General: Alert, no acute distress  HEENT: Normocephalic, atraumatic. EOMI, moist oral mucosa, no conjunctival injection  Neck: ROM normal, supple  Cardio: Heart regular rate and rhythm, cap refill <2seconds  Lungs: No respiratory distress, no accessory muscle usage, diffuse expiratory wheezing, tight breath sounds  Abdomen: Soft, nontender  MSK: ROM normal, no LE edema  Skin: Warm, dry, no rash  Neuro: No focal neurodeficits, Aox3    Labs notable for mildly elevated lipase, however patient has no abdominal tenderness to palpation or abdominal complaints.

## 2024-03-25 ENCOUNTER — OFFICE VISIT (OUTPATIENT)
Age: 64
End: 2024-03-25
Payer: COMMERCIAL

## 2024-03-25 VITALS
TEMPERATURE: 97.3 F | DIASTOLIC BLOOD PRESSURE: 70 MMHG | HEART RATE: 80 BPM | SYSTOLIC BLOOD PRESSURE: 117 MMHG | RESPIRATION RATE: 16 BRPM | BODY MASS INDEX: 31.83 KG/M2 | WEIGHT: 210 LBS | HEIGHT: 68 IN | OXYGEN SATURATION: 98 %

## 2024-03-25 DIAGNOSIS — R55 VASOVAGAL EPISODE: ICD-10-CM

## 2024-03-25 DIAGNOSIS — N17.9 AKI (ACUTE KIDNEY INJURY) (HCC): Primary | ICD-10-CM

## 2024-03-25 DIAGNOSIS — D50.0 IRON DEFICIENCY ANEMIA DUE TO CHRONIC BLOOD LOSS: ICD-10-CM

## 2024-03-25 DIAGNOSIS — J06.9 UPPER RESPIRATORY TRACT INFECTION, UNSPECIFIED TYPE: ICD-10-CM

## 2024-03-25 DIAGNOSIS — D69.6 THROMBOCYTOPENIA (HCC): ICD-10-CM

## 2024-03-25 PROCEDURE — 99214 OFFICE O/P EST MOD 30 MIN: CPT | Performed by: NURSE PRACTITIONER

## 2024-03-25 PROCEDURE — 3078F DIAST BP <80 MM HG: CPT | Performed by: NURSE PRACTITIONER

## 2024-03-25 PROCEDURE — 3074F SYST BP LT 130 MM HG: CPT | Performed by: NURSE PRACTITIONER

## 2024-03-25 NOTE — PATIENT INSTRUCTIONS
Vasovagal syncope (qfe-apz-JOV-gul QGFR-sxs-rjo) occurs when you faint because your body overreacts to certain triggers, such as the sight of blood or extreme emotional distress. It may also be called neurocardiogenic syncope.  The vasovagal syncope trigger causes your heart rate and blood pressure to drop suddenly. That leads to reduced blood flow to your brain, causing you to briefly lose consciousness.  Vasovagal syncope is usually harmless and requires no treatment. But it's possible that you may injure yourself during a vasovagal syncope episode. Your doctor may recommend tests to rule out more-serious causes of fainting, such as heart disorders.          [facility]  [address] [place] [address] [phone]  Fax [phone]    [facility]  [address], [place] [address] [phone]

## 2024-03-25 NOTE — PROGRESS NOTES
Chief Complaint   Patient presents with    Chest Congestion     X 1 week. Pt went to the ER last wednesday         \"Have you been to the ER, urgent care clinic since your last visit?  Hospitalized since your last visit?\"    YES - When: approximately 1  weeks ago.  Where and Why[facility].    “Have you seen or consulted any other health care providers outside of [facility] since your last visit?”    NO        “Have you had a colorectal cancer screening such as a colonoscopy/FIT/Cologuard?    NO    Date of last Colonoscopy: 9/26/2013  No cologuard on file  No FIT/FOBT on file   No flexible sigmoidoscopy on file         Click Here for Release of Records Request           3/7/2024     9:33 AM   PHQ-9    Little interest or pleasure in doing things 0   Feeling down, depressed, or hopeless 0   PHQ-2 Score 0   PHQ-9 Total Score 0           Financial Resource Strain: Not on file      Food Insecurity: Not on file (5/8/2023)          Health Maintenance Due   Topic Date Due    Pneumococcal 0-64 years Vaccine (1 of 2 - PCV) Never done    Diabetic retinal exam  Never done    DTaP/Tdap/Td vaccine (1 - Tdap) Never done    Respiratory Syncytial Virus (RSV) Pregnant or age 60 yrs+ (1 - 1-dose 60+ series) Never done    Colorectal Cancer Screen  09/26/2023

## 2024-03-25 NOTE — PROGRESS NOTES
[facility]  Clinic Note     Brigido Pardo (: 1960) is a 63 y.o. male, established patient, here for evaluation of the following chief complaint(s):  Chest Congestion (X 1 week. Pt went to the ER last wednesday)       ASSESSMENT/PLAN:  1. ALEXI (acute kidney injury) (HCC)  -     Comprehensive Metabolic Panel; Future  -On 3/21/2024 creatinine was noted to be 1.5/BUN 20/eGFR 52, sodium 134    2. Thrombocytopenia (HCC)  -     CBC with Auto Differential; Future  -Platelet level 124 on 3/21/2024    3. Upper respiratory tract infection, unspecified type  -Reviewed emergency room course. Negative for flu and COVID  - CXR showed no acute part cardiopulmonary disease  -Recommended completing azithromycin as prescribed and steroids.  -Encourage scheduled use of DuoNebs at home  -Trial Mucinex  -Increase oral hydration  -Declined prescription for Tessalon Perles at this time.  -Low threshold for returning to emergency room should symptoms progress and become worse  -May use Tylenol as needed for fever    4. Iron deficiency anemia due to chronic blood loss  -     CBC with Auto Differential; Future  -Last Hb 12.0/HCT 34.4/MCV 99.4/MCH 34.7/RBC 3.4  -We discussed progressive trend of low-grade anemia with self-reported intermittent bloody stools.  He does have a consultation next month with GI for this.    5. Vasovagal episode  -I suspect the syncopal episode was related to vasovagal eating after coughing.  We discussed what this means.      Return if symptoms worsen or fail to improve.              SUBJECTIVE/OBJECTIVE:    Brigido Pardo is a 63 y.o. male seen today for cough w/ syncopal episode and abnormal labs.   He is accompanied by his partner Brenda.  After experiencing what he thought was an upper respiratory action Mr. Pardo was evaluated by telemedicine provider and prescribed a Z-Jeremy.  He continued to experience worsening cough, congestion near syncopal episode with coughing he was

## 2024-03-26 LAB
ALBUMIN SERPL-MCNC: 4.2 G/DL (ref 3.5–5)
ALBUMIN/GLOB SERPL: 1.2 (ref 1.1–2.2)
ALP SERPL-CCNC: 43 U/L (ref 45–117)
ALT SERPL-CCNC: 43 U/L (ref 12–78)
ANION GAP SERPL CALC-SCNC: 4 MMOL/L (ref 5–15)
AST SERPL-CCNC: 15 U/L (ref 15–37)
BASOPHILS # BLD: 0 K/UL (ref 0–0.1)
BASOPHILS NFR BLD: 0 % (ref 0–1)
BILIRUB SERPL-MCNC: 0.3 MG/DL (ref 0.2–1)
BUN SERPL-MCNC: 35 MG/DL (ref 6–20)
BUN/CREAT SERPL: 26 (ref 12–20)
CALCIUM SERPL-MCNC: 10.8 MG/DL (ref 8.5–10.1)
CHLORIDE SERPL-SCNC: 98 MMOL/L (ref 97–108)
CO2 SERPL-SCNC: 30 MMOL/L (ref 21–32)
CREAT SERPL-MCNC: 1.36 MG/DL (ref 0.7–1.3)
DIFFERENTIAL METHOD BLD: ABNORMAL
EOSINOPHIL # BLD: 0 K/UL (ref 0–0.4)
EOSINOPHIL NFR BLD: 0 % (ref 0–7)
ERYTHROCYTE [DISTWIDTH] IN BLOOD BY AUTOMATED COUNT: 12.7 % (ref 11.5–14.5)
GLOBULIN SER CALC-MCNC: 3.4 G/DL (ref 2–4)
GLUCOSE SERPL-MCNC: 218 MG/DL (ref 65–100)
HCT VFR BLD AUTO: 34.3 % (ref 36.6–50.3)
HGB BLD-MCNC: 11.8 G/DL (ref 12.1–17)
IMM GRANULOCYTES # BLD AUTO: 0 K/UL
IMM GRANULOCYTES NFR BLD AUTO: 0 %
LYMPHOCYTES # BLD: 3.5 K/UL (ref 0.8–3.5)
LYMPHOCYTES NFR BLD: 23 % (ref 12–49)
MCH RBC QN AUTO: 34.8 PG (ref 26–34)
MCHC RBC AUTO-ENTMCNC: 34.4 G/DL (ref 30–36.5)
MCV RBC AUTO: 101.2 FL (ref 80–99)
METAMYELOCYTES NFR BLD MANUAL: 1 %
MONOCYTES # BLD: 0.9 K/UL (ref 0–1)
MONOCYTES NFR BLD: 6 % (ref 5–13)
MYELOCYTES NFR BLD MANUAL: 1 %
NEUTS SEG # BLD: 10.4 K/UL (ref 1.8–8)
NEUTS SEG NFR BLD: 69 % (ref 32–75)
NRBC # BLD: 0.04 K/UL (ref 0–0.01)
NRBC BLD-RTO: 0.3 PER 100 WBC
PLATELET # BLD AUTO: 213 K/UL (ref 150–400)
PLATELET COMMENT: ABNORMAL
PMV BLD AUTO: 11.3 FL (ref 8.9–12.9)
POTASSIUM SERPL-SCNC: 4.2 MMOL/L (ref 3.5–5.1)
PROT SERPL-MCNC: 7.6 G/DL (ref 6.4–8.2)
RBC # BLD AUTO: 3.39 M/UL (ref 4.1–5.7)
RBC MORPH BLD: ABNORMAL
SODIUM SERPL-SCNC: 132 MMOL/L (ref 136–145)
WBC # BLD AUTO: 15.1 K/UL (ref 4.1–11.1)

## 2024-03-27 ASSESSMENT — ENCOUNTER SYMPTOMS
GASTROINTESTINAL NEGATIVE: 1
COUGH: 1

## 2024-04-04 ENCOUNTER — NURSE ONLY (OUTPATIENT)
Age: 64
End: 2024-04-04

## 2024-04-04 ENCOUNTER — NURSE ONLY (OUTPATIENT)
Age: 64
End: 2024-04-04
Payer: COMMERCIAL

## 2024-04-04 VITALS — TEMPERATURE: 97.6 F

## 2024-04-04 DIAGNOSIS — Z23 NEED FOR HEPATITIS B VACCINATION: Primary | ICD-10-CM

## 2024-04-04 DIAGNOSIS — E87.1 HYPONATREMIA: ICD-10-CM

## 2024-04-04 DIAGNOSIS — D72.829 LEUKOCYTOSIS, UNSPECIFIED TYPE: ICD-10-CM

## 2024-04-04 LAB
ANION GAP SERPL CALC-SCNC: 3 MMOL/L (ref 5–15)
BASOPHILS # BLD: 0 K/UL (ref 0–0.1)
BASOPHILS NFR BLD: 1 % (ref 0–1)
BUN SERPL-MCNC: 25 MG/DL (ref 6–20)
BUN/CREAT SERPL: 24 (ref 12–20)
CALCIUM SERPL-MCNC: 9.8 MG/DL (ref 8.5–10.1)
CHLORIDE SERPL-SCNC: 102 MMOL/L (ref 97–108)
CO2 SERPL-SCNC: 29 MMOL/L (ref 21–32)
CREAT SERPL-MCNC: 1.05 MG/DL (ref 0.7–1.3)
DIFFERENTIAL METHOD BLD: ABNORMAL
EOSINOPHIL # BLD: 0.1 K/UL (ref 0–0.4)
EOSINOPHIL NFR BLD: 1 % (ref 0–7)
ERYTHROCYTE [DISTWIDTH] IN BLOOD BY AUTOMATED COUNT: 12.9 % (ref 11.5–14.5)
GLUCOSE SERPL-MCNC: 176 MG/DL (ref 65–100)
HCT VFR BLD AUTO: 35.1 % (ref 36.6–50.3)
HGB BLD-MCNC: 11.4 G/DL (ref 12.1–17)
IMM GRANULOCYTES # BLD AUTO: 0 K/UL (ref 0–0.04)
IMM GRANULOCYTES NFR BLD AUTO: 1 % (ref 0–0.5)
LYMPHOCYTES # BLD: 2.9 K/UL (ref 0.8–3.5)
LYMPHOCYTES NFR BLD: 49 % (ref 12–49)
MCH RBC QN AUTO: 33.6 PG (ref 26–34)
MCHC RBC AUTO-ENTMCNC: 32.5 G/DL (ref 30–36.5)
MCV RBC AUTO: 103.5 FL (ref 80–99)
MONOCYTES # BLD: 0.5 K/UL (ref 0–1)
MONOCYTES NFR BLD: 9 % (ref 5–13)
NEUTS SEG # BLD: 2.3 K/UL (ref 1.8–8)
NEUTS SEG NFR BLD: 39 % (ref 32–75)
NRBC # BLD: 0 K/UL (ref 0–0.01)
NRBC BLD-RTO: 0 PER 100 WBC
PLATELET # BLD AUTO: 186 K/UL (ref 150–400)
PMV BLD AUTO: 11.1 FL (ref 8.9–12.9)
POTASSIUM SERPL-SCNC: 4 MMOL/L (ref 3.5–5.1)
RBC # BLD AUTO: 3.39 M/UL (ref 4.1–5.7)
SODIUM SERPL-SCNC: 134 MMOL/L (ref 136–145)
WBC # BLD AUTO: 5.8 K/UL (ref 4.1–11.1)

## 2024-04-04 PROCEDURE — 90739 HEPB VACC 2/4 DOSE ADULT IM: CPT | Performed by: NURSE PRACTITIONER

## 2024-04-04 PROCEDURE — 90471 IMMUNIZATION ADMIN: CPT | Performed by: NURSE PRACTITIONER

## 2024-04-28 DIAGNOSIS — E78.1 HYPERTRIGLYCERIDEMIA: ICD-10-CM

## 2024-04-28 DIAGNOSIS — R93.1 ELEVATED CORONARY ARTERY CALCIUM SCORE: ICD-10-CM

## 2024-04-29 RX ORDER — ROSUVASTATIN CALCIUM 40 MG/1
40 TABLET, COATED ORAL DAILY
Qty: 90 TABLET | Refills: 0 | Status: SHIPPED | OUTPATIENT
Start: 2024-04-29

## 2024-04-29 RX ORDER — ALLOPURINOL 300 MG/1
300 TABLET ORAL DAILY
Qty: 90 TABLET | Refills: 0 | Status: SHIPPED | OUTPATIENT
Start: 2024-04-29

## 2024-04-29 RX ORDER — VALSARTAN 320 MG/1
320 TABLET ORAL DAILY
Qty: 90 TABLET | Refills: 0 | Status: SHIPPED | OUTPATIENT
Start: 2024-04-29

## 2024-04-29 NOTE — TELEPHONE ENCOUNTER
PCP: Amy Goldman APRN - NP    Last appt: 3/25/2024   Future Appointments   Date Time Provider Department Center   7/19/2024  2:30 PM Amy Goldman APRN - NP PAFANA BS AMB   8/16/2024  8:00 AM BSASMITA RICHARD ECHO 1 RUSTAM BS AMB   8/16/2024  9:00 AM Amparo Meyers MD CAVREY BS AMB       Requested Prescriptions     Pending Prescriptions Disp Refills    valsartan (DIOVAN) 320 MG tablet [Pharmacy Med Name: Valsartan 320 MG Oral Tablet] 90 tablet 3     Sig: TAKE 1 TABLET BY MOUTH DAILY    allopurinol (ZYLOPRIM) 300 MG tablet [Pharmacy Med Name: Allopurinol 300 MG Oral Tablet] 90 tablet 3     Sig: TAKE 1 TABLET BY MOUTH DAILY    rosuvastatin (CRESTOR) 40 MG tablet [Pharmacy Med Name: Rosuvastatin Calcium 40 MG Oral Tablet] 90 tablet 3     Sig: TAKE 1 TABLET BY MOUTH DAILY         Prior labs and Blood pressures:  BP Readings from Last 3 Encounters:   03/25/24 117/70   03/21/24 131/65   03/07/24 110/67     Lab Results   Component Value Date/Time     04/04/2024 09:17 AM    K 4.0 04/04/2024 09:17 AM     04/04/2024 09:17 AM    CO2 29 04/04/2024 09:17 AM    BUN 25 04/04/2024 09:17 AM    GFRAA >60 07/11/2022 02:03 PM     Lab Results   Component Value Date/Time    [id number] 6.2 06/14/2023 09:03 AM     Lab Results   Component Value Date/Time    CHOL 216 03/07/2024 10:34 AM    CHOL 131 12/09/2020 10:33 AM    HDL 28 03/07/2024 10:34 AM     No results found for: \"VITD3\", \"VD3RIA\"    Lab Results   Component Value Date/Time    TSH 2.190 06/03/2019 10:15 AM

## 2024-05-23 RX ORDER — VENLAFAXINE HYDROCHLORIDE 75 MG/1
150 CAPSULE, EXTENDED RELEASE ORAL DAILY
Qty: 180 CAPSULE | Refills: 3 | Status: SHIPPED | OUTPATIENT
Start: 2024-05-23

## 2024-05-23 NOTE — TELEPHONE ENCOUNTER
PCP: Amy Goldman APRN - NP    Last appt: 3/25/2024   Future Appointments   Date Time Provider Department Center   7/19/2024  2:30 PM Amy Goldman APRN - NP PAFP BS AMB   8/16/2024  8:00 AM BSC RICHARD ECHO 1 CAROLYNREY BS AMB   8/16/2024  9:00 AM Amparo Meyers MD C[facility]REY BS AMB       Requested Prescriptions     Pending Prescriptions Disp Refills    venlafaxine (EFFEXOR XR) 75 MG extended release capsule [Pharmacy Med Name: Venlafaxine HCl ER 75 MG Oral Capsule Extended Release 24 Hour] 180 capsule 3     Sig: TAKE 2 CAPSULES BY MOUTH DAILY         Prior labs and Blood pressures:  BP Readings from Last 3 Encounters:   03/25/24 117/70   03/21/24 131/65   03/07/24 110/67     Lab Results   Component Value Date/Time     04/04/2024 09:17 AM    K 4.0 04/04/2024 09:17 AM     04/04/2024 09:17 AM    CO2 29 04/04/2024 09:17 AM    BUN 25 04/04/2024 09:17 AM    GFRAA >60 07/11/2022 02:03 PM     Lab Results   Component Value Date/Time    FTS5HOWJ 6.2 06/14/2023 09:03 AM     Lab Results   Component Value Date/Time    CHOL 216 03/07/2024 10:34 AM    CHOL 131 12/09/2020 10:33 AM    HDL 28 03/07/2024 10:34 AM    LDL Not calculated due to elevated triglyceride level 03/07/2024 10:34 AM    VLDL  03/07/2024 10:34 AM     Calculation not valid with this patient's other Lipid values.     No results found for: \"VITD3\"    Lab Results   Component Value Date/Time    TSH 2.190 06/03/2019 10:15 AM

## 2024-07-16 SDOH — ECONOMIC STABILITY: FOOD INSECURITY: WITHIN THE PAST 12 MONTHS, YOU WORRIED THAT YOUR FOOD WOULD RUN OUT BEFORE YOU GOT MONEY TO BUY MORE.: NEVER TRUE

## 2024-07-16 SDOH — ECONOMIC STABILITY: TRANSPORTATION INSECURITY
IN THE PAST 12 MONTHS, HAS LACK OF TRANSPORTATION KEPT YOU FROM MEETINGS, WORK, OR FROM GETTING THINGS NEEDED FOR DAILY LIVING?: NO

## 2024-07-16 SDOH — ECONOMIC STABILITY: HOUSING INSECURITY
IN THE LAST 12 MONTHS, WAS THERE A TIME WHEN YOU DID NOT HAVE A STEADY PLACE TO SLEEP OR SLEPT IN A SHELTER (INCLUDING NOW)?: NO

## 2024-07-16 SDOH — ECONOMIC STABILITY: FOOD INSECURITY: WITHIN THE PAST 12 MONTHS, THE FOOD YOU BOUGHT JUST DIDN'T LAST AND YOU DIDN'T HAVE MONEY TO GET MORE.: NEVER TRUE

## 2024-07-16 SDOH — ECONOMIC STABILITY: INCOME INSECURITY: HOW HARD IS IT FOR YOU TO PAY FOR THE VERY BASICS LIKE FOOD, HOUSING, MEDICAL CARE, AND HEATING?: NOT HARD AT ALL

## 2024-07-19 ENCOUNTER — OFFICE VISIT (OUTPATIENT)
Age: 64
End: 2024-07-19
Payer: COMMERCIAL

## 2024-07-19 VITALS
HEART RATE: 66 BPM | HEIGHT: 68 IN | BODY MASS INDEX: 33.22 KG/M2 | WEIGHT: 219.2 LBS | TEMPERATURE: 98.2 F | DIASTOLIC BLOOD PRESSURE: 65 MMHG | OXYGEN SATURATION: 96 % | SYSTOLIC BLOOD PRESSURE: 117 MMHG

## 2024-07-19 DIAGNOSIS — Z23 NEED FOR PNEUMOCOCCAL VACCINE: ICD-10-CM

## 2024-07-19 DIAGNOSIS — I10 ESSENTIAL HYPERTENSION: ICD-10-CM

## 2024-07-19 DIAGNOSIS — E11.65 TYPE 2 DIABETES MELLITUS WITH HYPERGLYCEMIA, UNSPECIFIED WHETHER LONG TERM INSULIN USE (HCC): Primary | ICD-10-CM

## 2024-07-19 DIAGNOSIS — Z23 NEED FOR TDAP VACCINATION: ICD-10-CM

## 2024-07-19 DIAGNOSIS — Z87.39 HISTORY OF GOUT: ICD-10-CM

## 2024-07-19 DIAGNOSIS — Z23 NEED FOR HEPATITIS A IMMUNIZATION: ICD-10-CM

## 2024-07-19 DIAGNOSIS — D50.8 IRON DEFICIENCY ANEMIA SECONDARY TO INADEQUATE DIETARY IRON INTAKE: ICD-10-CM

## 2024-07-19 DIAGNOSIS — E78.1 HYPERTRIGLYCERIDEMIA: ICD-10-CM

## 2024-07-19 PROCEDURE — 90471 IMMUNIZATION ADMIN: CPT | Performed by: NURSE PRACTITIONER

## 2024-07-19 PROCEDURE — 3074F SYST BP LT 130 MM HG: CPT | Performed by: NURSE PRACTITIONER

## 2024-07-19 PROCEDURE — 90677 PCV20 VACCINE IM: CPT | Performed by: NURSE PRACTITIONER

## 2024-07-19 PROCEDURE — 90715 TDAP VACCINE 7 YRS/> IM: CPT | Performed by: NURSE PRACTITIONER

## 2024-07-19 PROCEDURE — 3078F DIAST BP <80 MM HG: CPT | Performed by: NURSE PRACTITIONER

## 2024-07-19 PROCEDURE — 90472 IMMUNIZATION ADMIN EACH ADD: CPT | Performed by: NURSE PRACTITIONER

## 2024-07-19 PROCEDURE — 3044F HG A1C LEVEL LT 7.0%: CPT | Performed by: NURSE PRACTITIONER

## 2024-07-19 PROCEDURE — 99214 OFFICE O/P EST MOD 30 MIN: CPT | Performed by: NURSE PRACTITIONER

## 2024-07-19 PROCEDURE — 90632 HEPA VACCINE ADULT IM: CPT | Performed by: NURSE PRACTITIONER

## 2024-07-19 RX ORDER — FENOFIBRATE 145 MG/1
145 TABLET, COATED ORAL DAILY
Qty: 90 TABLET | Refills: 1 | Status: SHIPPED | OUTPATIENT
Start: 2024-07-19

## 2024-07-19 RX ORDER — TESTOSTERONE CYPIONATE 200 MG/ML
INJECTION, SOLUTION INTRAMUSCULAR
Status: CANCELLED | OUTPATIENT
Start: 2024-07-19

## 2024-07-19 RX ORDER — ALLOPURINOL 300 MG/1
300 TABLET ORAL DAILY
Qty: 90 TABLET | Refills: 0 | Status: SHIPPED | OUTPATIENT
Start: 2024-07-19

## 2024-07-19 ASSESSMENT — PATIENT HEALTH QUESTIONNAIRE - PHQ9
1. LITTLE INTEREST OR PLEASURE IN DOING THINGS: NOT AT ALL
SUM OF ALL RESPONSES TO PHQ QUESTIONS 1-9: 0
SUM OF ALL RESPONSES TO PHQ9 QUESTIONS 1 & 2: 0
SUM OF ALL RESPONSES TO PHQ QUESTIONS 1-9: 0
2. FEELING DOWN, DEPRESSED OR HOPELESS: NOT AT ALL

## 2024-07-19 NOTE — PROGRESS NOTES
Chief Complaint   Patient presents with    Immunizations     [place]         \"Have you been to the ER, urgent care clinic since your last visit?  Hospitalized since your last visit?\"    NO    “Have you seen or consulted any other health care providers outside of [facility] since your last visit?”    NO            Click Here for Release of Records Request           7/19/2024     2:30 PM   PHQ-9    Little interest or pleasure in doing things 0   Feeling down, depressed, or hopeless 0   PHQ-2 Score 0   PHQ-9 Total Score 0           Financial Resource Strain: Low Risk  (7/16/2024)    Overall Financial Resource Strain (CARDIA)     Difficulty of Paying Living Expenses: Not hard at all      Food Insecurity: No Food Insecurity (7/16/2024)    Hunger Vital Sign     Worried About Running Out of Food in the Last Year: Never true     Ran Out of Food in the Last Year: Never true          Health Maintenance Due   Topic Date Due    Pneumococcal 0-64 years Vaccine (1 of 2 - PCV) Never done    Diabetic retinal exam  Never done    DTaP/Tdap/Td vaccine (1 - Tdap) Never done    Respiratory Syncytial Virus (RSV) Pregnant or age 60 yrs+ (1 - 1-dose 60+ series) Never done    Diabetic foot exam  06/14/2024    Diabetic Alb to Cr ratio (uACR) test  06/15/2024

## 2024-07-19 NOTE — PROGRESS NOTES
[facility]  Clinic Note     Brigido Pardo (: 1960) is a 63 y.o. male, established patient, here for evaluation of the following chief complaint(s):  Immunizations ([place] the country)       ASSESSMENT/PLAN:    1. Type 2 diabetes mellitus with hyperglycemia, unspecified whether long term insulin use (HCC)  -     Microalbumin / Creatinine Urine Ratio; Future  -     Hemoglobin A1C; Future  -     HM DIABETES FOOT EXAM  - Last A1c 6.4 on 24. Previously 7.0 on 2/3/23    2. Essential hypertension  - Controlled. No adjustment needed at this time    3. Iron deficiency anemia secondary to inadequate dietary iron intake  -     CBC with Auto Differential; Future    4. Hypertriglyceridemia  -     fenofibrate (TRICOR) 145 MG tablet; Take 1 tablet by mouth daily, Disp-90 tablet, R-1Please send a replace/new response with 90-Day Supply if appropriate to maximize member benefit. Requesting 1 year supply.Normal  Lab Results   Component Value Date    CHOL 216 (H) 2024    TRIG 916 (H) 2024    HDL 28 2024    LDL Not calculated due to elevated triglyceride level 2024    VLDL  2024     Calculation not valid with this patient's other Lipid values.    CHOLHDLRATIO 7.7 (H) 2024       5. History of gout  -     allopurinol (ZYLOPRIM) 300 MG tablet; Take 1 tablet by mouth daily, Disp-90 tablet, R-0Please send a replace/new response with 90-Day Supply if appropriate to maximize member benefit. Requesting 1 year supply.Normal    6. Need for Tdap vaccination  -     Pneumococcal, PCV20, PREVNAR 20, (age 6w+), IM, PF  -     Tdap, BOOSTRIX, (age 10 yrs+), IM    7. Need for pneumococcal vaccine  -     CT IM ADM PRQ ID SUBQ/IM NJXS 1 VACCINE  -     -     Pneumococcal, PCV20, PREVNAR 20, (age 6w+), IM, PF    8. Need for hepatitis A immunization  -     CT IM ADM PRQ ID SUBQ/IM NJXS EA VACCINE  -     Hep A, HAVRIX, (age 19 yrs+), IM                Return in about 6 months

## 2024-07-20 LAB
BASOPHILS # BLD: 0 K/UL (ref 0–0.1)
BASOPHILS NFR BLD: 0 % (ref 0–1)
CREAT UR-MCNC: 33.8 MG/DL
DIFFERENTIAL METHOD BLD: ABNORMAL
EOSINOPHIL # BLD: 0 K/UL (ref 0–0.4)
EOSINOPHIL NFR BLD: 1 % (ref 0–7)
ERYTHROCYTE [DISTWIDTH] IN BLOOD BY AUTOMATED COUNT: 13.1 % (ref 11.5–14.5)
EST. AVERAGE GLUCOSE BLD GHB EST-MCNC: 146 MG/DL
HBA1C MFR BLD: 6.7 % (ref 4–5.6)
HCT VFR BLD AUTO: 33.8 % (ref 36.6–50.3)
HGB BLD-MCNC: 11.3 G/DL (ref 12.1–17)
IMM GRANULOCYTES # BLD AUTO: 0 K/UL (ref 0–0.04)
IMM GRANULOCYTES NFR BLD AUTO: 1 % (ref 0–0.5)
LYMPHOCYTES # BLD: 2.8 K/UL (ref 0.8–3.5)
LYMPHOCYTES NFR BLD: 50 % (ref 12–49)
MCH RBC QN AUTO: 34.2 PG (ref 26–34)
MCHC RBC AUTO-ENTMCNC: 33.4 G/DL (ref 30–36.5)
MCV RBC AUTO: 102.4 FL (ref 80–99)
MICROALBUMIN UR-MCNC: <0.5 MG/DL
MICROALBUMIN/CREAT UR-RTO: <15 MG/G (ref 0–30)
MONOCYTES # BLD: 0.5 K/UL (ref 0–1)
MONOCYTES NFR BLD: 9 % (ref 5–13)
NEUTS SEG # BLD: 2.2 K/UL (ref 1.8–8)
NEUTS SEG NFR BLD: 39 % (ref 32–75)
NRBC # BLD: 0 K/UL (ref 0–0.01)
NRBC BLD-RTO: 0 PER 100 WBC
PLATELET # BLD AUTO: 148 K/UL (ref 150–400)
PMV BLD AUTO: 11.3 FL (ref 8.9–12.9)
RBC # BLD AUTO: 3.3 M/UL (ref 4.1–5.7)
WBC # BLD AUTO: 5.7 K/UL (ref 4.1–11.1)

## 2024-07-27 DIAGNOSIS — R93.1 ELEVATED CORONARY ARTERY CALCIUM SCORE: ICD-10-CM

## 2024-07-27 DIAGNOSIS — E78.1 HYPERTRIGLYCERIDEMIA: ICD-10-CM

## 2024-07-29 RX ORDER — ROSUVASTATIN CALCIUM 40 MG/1
40 TABLET, COATED ORAL DAILY
Qty: 90 TABLET | Refills: 0 | Status: SHIPPED | OUTPATIENT
Start: 2024-07-29

## 2024-07-29 RX ORDER — VALSARTAN 320 MG/1
320 TABLET ORAL DAILY
Qty: 90 TABLET | Refills: 0 | Status: SHIPPED | OUTPATIENT
Start: 2024-07-29

## 2024-07-29 NOTE — TELEPHONE ENCOUNTER
PCP: Amy Goldman, APRN - NP    Last appt: 7/19/2024   Future Appointments   Date Time Provider Department Center   8/16/2024  8:00 AM [facility] ECHO 1 [facility] AMB   8/16/2024  9:00 AM Amparo Meyers MD [facility] AMB       Requested Prescriptions     Pending Prescriptions Disp Refills    rosuvastatin (CRESTOR) 40 MG tablet [Pharmacy Med Name: Rosuvastatin Calcium 40 MG Oral Tablet] 90 tablet 3     Sig: TAKE 1 TABLET BY MOUTH DAILY    valsartan (DIOVAN) 320 MG tablet [Pharmacy Med Name: Valsartan 320 MG Oral Tablet] 90 tablet 3     Sig: TAKE 1 TABLET BY MOUTH DAILY         Prior labs and Blood pressures:  BP Readings from Last 3 Encounters:   07/19/24 117/65   03/25/24 117/70   03/21/24 131/65     Lab Results   Component Value Date/Time     04/04/2024 09:17 AM    K 4.0 04/04/2024 09:17 AM     04/04/2024 09:17 AM    CO2 29 04/04/2024 09:17 AM    BUN 25 04/04/2024 09:17 AM    GFRAA >60 07/11/2022 02:03 PM     Lab Results   Component Value Date/Time    [id number] 6.2 06/14/2023 09:03 AM     Lab Results   Component Value Date/Time    CHOL 216 03/07/2024 10:34 AM    CHOL 131 12/09/2020 10:33 AM    HDL 28 03/07/2024 10:34 AM    LDL Not calculated due to elevated triglyceride level 03/07/2024 10:34 AM    VLDL  03/07/2024 10:34 AM     Calculation not valid with this patient's other Lipid values.     No results found for: \"VITD3\"    Lab Results   Component Value Date/Time    TSH 2.190 06/03/2019 10:15 AM

## 2024-08-29 ENCOUNTER — TELEPHONE (OUTPATIENT)
Age: 64
End: 2024-08-29

## 2024-08-29 NOTE — TELEPHONE ENCOUNTER
Patient having some issues since coming back from vacation. He would like to ask some questions about his medication.      Call back at [phone]

## 2024-08-29 NOTE — TELEPHONE ENCOUNTER
Called pt back, been 14 days since coming back and is still coughing and back has broken out. Notified that Jones HERRMANN is out of office

## 2024-08-30 ENCOUNTER — TELEPHONE (OUTPATIENT)
Age: 64
End: 2024-08-30

## 2024-08-30 ENCOUNTER — OFFICE VISIT (OUTPATIENT)
Age: 64
End: 2024-08-30

## 2024-08-30 VITALS
HEART RATE: 81 BPM | SYSTOLIC BLOOD PRESSURE: 146 MMHG | OXYGEN SATURATION: 95 % | BODY MASS INDEX: 32.43 KG/M2 | WEIGHT: 214 LBS | RESPIRATION RATE: 18 BRPM | HEIGHT: 68 IN | DIASTOLIC BLOOD PRESSURE: 81 MMHG | TEMPERATURE: 98.4 F

## 2024-08-30 DIAGNOSIS — B96.89 BACTERIAL URI: Primary | ICD-10-CM

## 2024-08-30 DIAGNOSIS — J06.9 BACTERIAL URI: Primary | ICD-10-CM

## 2024-08-30 DIAGNOSIS — R05.1 ACUTE COUGH: ICD-10-CM

## 2024-08-30 DIAGNOSIS — I10 PRIMARY HYPERTENSION: ICD-10-CM

## 2024-08-30 RX ORDER — DOXYCYCLINE HYCLATE 100 MG
100 TABLET ORAL 2 TIMES DAILY
Qty: 14 TABLET | Refills: 0 | Status: SHIPPED | OUTPATIENT
Start: 2024-08-30 | End: 2024-09-06

## 2024-08-30 RX ORDER — METHYLPREDNISOLONE 4 MG
TABLET, DOSE PACK ORAL
Qty: 21 KIT | Refills: 0 | Status: SHIPPED | OUTPATIENT
Start: 2024-08-30 | End: 2024-09-05

## 2024-08-30 RX ORDER — BENZONATATE 200 MG/1
200 CAPSULE ORAL 2 TIMES DAILY PRN
Qty: 14 CAPSULE | Refills: 0 | Status: SHIPPED | OUTPATIENT
Start: 2024-08-30 | End: 2024-09-06

## 2024-08-30 NOTE — PROGRESS NOTES
Subjective:       History was provided by the patient.  Brigido Pardo is a 63 y.o. male who presents for evaluation of symptoms of a URI. Symptoms include dry cough and sinus and nasal congestion. Onset of symptoms was 2 weeks ago, gradually worsening since that time. Associated symptoms include congestion and non productive cough.  He is drinking plenty of fluids. Evaluation to date: none. Treatment to date: OTC cough suppressant  Patient's medications, allergies, past medical, surgical, social and family histories were reviewed and updated as appropriate.    Review of Systems  Pertinent items are noted in HPI.      Objective:      General appearance: alert, appears stated age, and cooperative  Ears: normal TM's and external ear canals both ears  Nose: no discharge, turbinates normal, no sinus tenderness  Throat: normal findings: pink and moist  Lungs: clear to auscultation bilaterally  Heart: S1, S2 normal  Lymph nodes:  nodes normal         Assessment:      1. Acute cough  - XR CHEST (2 VIEWS); Future    2. Bacterial URI  Patient appears mildly ill, vital signs are as noted. Ears normal.  Throat and pharynx normal.  Neck supple. No adenopathy in the neck. Nose is congested. Sinuses non tender. The chest is clear, without wheezes or rales. CXR findings suggestive of reactive airways.      Patient is previously healthy and immunocompetent, presenting with symptoms suspicious for likely bacterial upper respiratory infection.  Differential includes acute bronchitis, rhinosinusitis, allergic rhinitis, bacterial pneumonia, or COVID.  Do not suspect underlying cardiopulmonary process.  I considered, but think unlikely, dangerous causes of this patient's symptoms to include ACS, CHF or COPD exacerbations, pneumonia, pneumothorax.  Patient is nontoxic appearing and not in need of emergent medical intervention.    The patient is a good candidate for outpatient therapy based on normal PO intake, reassuring exam with

## 2024-08-30 NOTE — PATIENT INSTRUCTIONS
If symptoms worsens or fail to improve follow-up with PCP or ER.     Thank you for visiting [facility] today    Nasal Congestion:  Flonase or Nasonex (over the counter) nasal spray, once a day  Saline irrigation kits help wash out sinuses 1-2 times a day  Normal saline nasal spray  Afrin nasal spray no longer than three days  Cough:  Throat lozenges, hot tea, and honey may help  Vicks VapoRub at night to help with cough and relieve muscles aches and pain  If not prescribed a cough medication, Robitussin DM is an option.  It is an over the counter cough medication containing dextromethorphan to help suppress cough at night   *Please only take when absolutely needed, as this is a controlled substance that can cause addiction   *Please only take cough syrup at nighttime as it causes drowsiness   *Do not drive or operate any machinery while taking this medication  If you have high blood pressure, take Coricidin HBP (or the generic form) instead.  Follow instructions on the box.  Sore Throat:  Lozenges, as needed. Cepacol lozenges will help numb the throat  Chloraseptic spray also helps to numb throat pain  Salt water gargles to soothe throat pain  Sinus pain/pressure:  Warm, wet towel on face to help with facial sinus pain/pressure  Headache/Pain Fever/Body Aches:  If you can take NSAIDs, take Ibuprofen 400-800mg every 8 hours as needed  If you cannot take NSAIDs, take Tylenol 325-500mg every 6 hours as needed  Miscellanous:  Zyrtec/Xyzal/Allegra/Claritin during the day. You  may also use the decongestant version of these medications.   Simple foods like chicken noodle soup, smoothies, hot tea with lemon and honey may also help  Avoid smoking  Minimize exposure to irritants    Please follow up with your primary care provider within 2-5 days if your signs and symptoms have not resolved or worsened.    Please go immediately to the Emergency Department if you develop shortness of breath, chest pain and

## 2024-10-25 DIAGNOSIS — E78.1 HYPERTRIGLYCERIDEMIA: ICD-10-CM

## 2024-10-25 DIAGNOSIS — R93.1 ELEVATED CORONARY ARTERY CALCIUM SCORE: ICD-10-CM

## 2024-10-28 RX ORDER — VALSARTAN 320 MG/1
320 TABLET ORAL DAILY
Qty: 90 TABLET | Refills: 3 | Status: SHIPPED | OUTPATIENT
Start: 2024-10-28

## 2024-10-28 RX ORDER — ROSUVASTATIN CALCIUM 40 MG/1
40 TABLET, COATED ORAL DAILY
Qty: 90 TABLET | Refills: 3 | Status: SHIPPED | OUTPATIENT
Start: 2024-10-28

## 2024-10-28 NOTE — TELEPHONE ENCOUNTER
PCP: Amy Goldman, APRN - NP    Last appt: 7/19/2024     No future appointments.    Requested Prescriptions     Pending Prescriptions Disp Refills    valsartan (DIOVAN) 320 MG tablet [Pharmacy Med Name: Valsartan 320 MG Oral Tablet] 90 tablet 3     Sig: TAKE 1 TABLET BY MOUTH DAILY    rosuvastatin (CRESTOR) 40 MG tablet [Pharmacy Med Name: Rosuvastatin Calcium 40 MG Oral Tablet] 90 tablet 3     Sig: TAKE 1 TABLET BY MOUTH DAILY         Prior labs and Blood pressures:  BP Readings from Last 3 Encounters:   08/30/24 (!) 146/81   07/19/24 117/65   03/25/24 117/70     Lab Results   Component Value Date/Time     04/04/2024 09:17 AM    K 4.0 04/04/2024 09:17 AM     04/04/2024 09:17 AM    CO2 29 04/04/2024 09:17 AM    BUN 25 04/04/2024 09:17 AM    GFRAA >60 07/11/2022 02:03 PM     Lab Results   Component Value Date/Time    [id number] 6.2 06/14/2023 09:03 AM      Lab Results   Component Value Date/Time    TSH 2.190 06/03/2019 10:15 AM

## 2024-11-07 DIAGNOSIS — E78.1 HYPERTRIGLYCERIDEMIA: ICD-10-CM

## 2024-11-08 RX ORDER — FENOFIBRATE 145 MG/1
145 TABLET, COATED ORAL DAILY
Qty: 90 TABLET | Refills: 1 | Status: SHIPPED | OUTPATIENT
Start: 2024-11-08

## 2024-11-08 NOTE — TELEPHONE ENCOUNTER
PCP: Amy Goldman, APRN - NP    Last appt: 7/19/2024   No future appointments.    Requested Prescriptions     Pending Prescriptions Disp Refills    fenofibrate (TRICOR) 145 MG tablet [Pharmacy Med Name: Fenofibrate 145 MG Oral Tablet] 90 tablet 3     Sig: TAKE 1 TABLET BY MOUTH DAILY         Prior labs and Blood pressures:  BP Readings from Last 3 Encounters:   08/30/24 (!) 146/81   07/19/24 117/65   03/25/24 117/70     Lab Results   Component Value Date/Time     04/04/2024 09:17 AM    K 4.0 04/04/2024 09:17 AM     04/04/2024 09:17 AM    CO2 29 04/04/2024 09:17 AM    BUN 25 04/04/2024 09:17 AM    GFRAA >60 07/11/2022 02:03 PM     Lab Results   Component Value Date/Time    [id number] 6.2 06/14/2023 09:03 AM     Lab Results   Component Value Date/Time    CHOL 216 03/07/2024 10:34 AM    CHOL 131 12/09/2020 10:33 AM    HDL 28 03/07/2024 10:34 AM    LDL Not calculated due to elevated triglyceride level 03/07/2024 10:34 AM    LDLC 34 12/09/2020 10:33 AM    VLDL  03/07/2024 10:34 AM     Calculation not valid with this patient's other Lipid values.     No results found for: \"VITD3\"    Lab Results   Component Value Date/Time    TSH 2.190 06/03/2019 10:15 AM

## 2024-11-23 ENCOUNTER — APPOINTMENT (OUTPATIENT)
Facility: HOSPITAL | Age: 64
End: 2024-11-23
Payer: COMMERCIAL

## 2024-11-23 ENCOUNTER — HOSPITAL ENCOUNTER (EMERGENCY)
Facility: HOSPITAL | Age: 64
Discharge: HOME | End: 2024-11-23
Attending: EMERGENCY MEDICINE
Payer: COMMERCIAL

## 2024-11-23 VITALS
BODY MASS INDEX: 32.74 KG/M2 | OXYGEN SATURATION: 95 % | HEART RATE: 96 BPM | TEMPERATURE: 97.6 F | DIASTOLIC BLOOD PRESSURE: 82 MMHG | RESPIRATION RATE: 18 BRPM | HEIGHT: 68 IN | WEIGHT: 216.05 LBS | SYSTOLIC BLOOD PRESSURE: 158 MMHG

## 2024-11-23 DIAGNOSIS — S91.312A LACERATION OF LEFT FOOT, INITIAL ENCOUNTER: Primary | ICD-10-CM

## 2024-11-23 LAB
ALBUMIN SERPL-MCNC: 4.1 G/DL (ref 3.5–5)
ALBUMIN/GLOB SERPL: 1.2 (ref 1.1–2.2)
ALP SERPL-CCNC: 36 U/L (ref 45–117)
ALT SERPL-CCNC: 48 U/L (ref 12–78)
ANION GAP SERPL CALC-SCNC: 16 MMOL/L (ref 2–12)
AST SERPL-CCNC: 29 U/L (ref 15–37)
BASOPHILS # BLD: 0.1 K/UL (ref 0–0.1)
BASOPHILS NFR BLD: 1 % (ref 0–1)
BILIRUB SERPL-MCNC: 0.4 MG/DL (ref 0.2–1)
BUN SERPL-MCNC: 26 MG/DL (ref 6–20)
BUN/CREAT SERPL: 18 (ref 12–20)
CALCIUM SERPL-MCNC: 9.4 MG/DL (ref 8.5–10.1)
CHLORIDE SERPL-SCNC: 97 MMOL/L (ref 97–108)
CO2 SERPL-SCNC: 20 MMOL/L (ref 21–32)
COMMENT:: NORMAL
CREAT SERPL-MCNC: 1.48 MG/DL (ref 0.7–1.3)
DIFFERENTIAL METHOD BLD: ABNORMAL
EOSINOPHIL # BLD: 0 K/UL (ref 0–0.4)
EOSINOPHIL NFR BLD: 0 % (ref 0–7)
ERYTHROCYTE [DISTWIDTH] IN BLOOD BY AUTOMATED COUNT: 12.4 % (ref 11.5–14.5)
GLOBULIN SER CALC-MCNC: 3.4 G/DL (ref 2–4)
GLUCOSE SERPL-MCNC: 238 MG/DL (ref 65–100)
HCT VFR BLD AUTO: 34.8 % (ref 36.6–50.3)
HGB BLD-MCNC: 11.9 G/DL (ref 12.1–17)
IMM GRANULOCYTES # BLD AUTO: 0 K/UL (ref 0–0.04)
IMM GRANULOCYTES NFR BLD AUTO: 0 % (ref 0–0.5)
LYMPHOCYTES # BLD: 5.4 K/UL (ref 0.8–3.5)
LYMPHOCYTES NFR BLD: 49 % (ref 12–49)
MCH RBC QN AUTO: 33.9 PG (ref 26–34)
MCHC RBC AUTO-ENTMCNC: 34.2 G/DL (ref 30–36.5)
MCV RBC AUTO: 99.1 FL (ref 80–99)
MONOCYTES # BLD: 0.9 K/UL (ref 0–1)
MONOCYTES NFR BLD: 8 % (ref 5–13)
NEUTS SEG # BLD: 4.7 K/UL (ref 1.8–8)
NEUTS SEG NFR BLD: 42 % (ref 32–75)
NRBC # BLD: 0 K/UL (ref 0–0.01)
NRBC BLD-RTO: 0 PER 100 WBC
PLATELET # BLD AUTO: 157 K/UL (ref 150–400)
PMV BLD AUTO: 10.8 FL (ref 8.9–12.9)
POTASSIUM SERPL-SCNC: 3.3 MMOL/L (ref 3.5–5.1)
PROT SERPL-MCNC: 7.5 G/DL (ref 6.4–8.2)
RBC # BLD AUTO: 3.51 M/UL (ref 4.1–5.7)
SODIUM SERPL-SCNC: 133 MMOL/L (ref 136–145)
SPECIMEN HOLD: NORMAL
WBC # BLD AUTO: 11.1 K/UL (ref 4.1–11.1)

## 2024-11-23 PROCEDURE — 85025 COMPLETE CBC W/AUTO DIFF WBC: CPT

## 2024-11-23 PROCEDURE — 6360000002 HC RX W HCPCS: Performed by: STUDENT IN AN ORGANIZED HEALTH CARE EDUCATION/TRAINING PROGRAM

## 2024-11-23 PROCEDURE — 6370000000 HC RX 637 (ALT 250 FOR IP): Performed by: STUDENT IN AN ORGANIZED HEALTH CARE EDUCATION/TRAINING PROGRAM

## 2024-11-23 PROCEDURE — 90714 TD VACC NO PRESV 7 YRS+ IM: CPT | Performed by: STUDENT IN AN ORGANIZED HEALTH CARE EDUCATION/TRAINING PROGRAM

## 2024-11-23 PROCEDURE — 73630 X-RAY EXAM OF FOOT: CPT

## 2024-11-23 PROCEDURE — 80053 COMPREHEN METABOLIC PANEL: CPT

## 2024-11-23 PROCEDURE — 93005 ELECTROCARDIOGRAM TRACING: CPT | Performed by: STUDENT IN AN ORGANIZED HEALTH CARE EDUCATION/TRAINING PROGRAM

## 2024-11-23 PROCEDURE — 36415 COLL VENOUS BLD VENIPUNCTURE: CPT

## 2024-11-23 PROCEDURE — 12035 INTMD RPR S/A/T/EXT 12.6-20: CPT

## 2024-11-23 PROCEDURE — 90471 IMMUNIZATION ADMIN: CPT | Performed by: STUDENT IN AN ORGANIZED HEALTH CARE EDUCATION/TRAINING PROGRAM

## 2024-11-23 PROCEDURE — 99285 EMERGENCY DEPT VISIT HI MDM: CPT

## 2024-11-23 RX ORDER — 0.9 % SODIUM CHLORIDE 0.9 %
1000 INTRAVENOUS SOLUTION INTRAVENOUS ONCE
Status: DISCONTINUED | OUTPATIENT
Start: 2024-11-23 | End: 2024-11-23 | Stop reason: HOSPADM

## 2024-11-23 RX ORDER — POTASSIUM CHLORIDE 750 MG/1
20 TABLET, EXTENDED RELEASE ORAL ONCE
Status: COMPLETED | OUTPATIENT
Start: 2024-11-23 | End: 2024-11-23

## 2024-11-23 RX ORDER — LIDOCAINE HYDROCHLORIDE 10 MG/ML
10 INJECTION, SOLUTION EPIDURAL; INFILTRATION; INTRACAUDAL; PERINEURAL
Status: COMPLETED | OUTPATIENT
Start: 2024-11-23 | End: 2024-11-23

## 2024-11-23 RX ADMIN — LIDOCAINE HYDROCHLORIDE 10 ML: 10 INJECTION, SOLUTION EPIDURAL; INFILTRATION; INTRACAUDAL; PERINEURAL at 19:00

## 2024-11-23 RX ADMIN — CLOSTRIDIUM TETANI TOXOID ANTIGEN (FORMALDEHYDE INACTIVATED) AND CORYNEBACTERIUM DIPHTHERIAE TOXOID ANTIGEN (FORMALDEHYDE INACTIVATED) 0.5 ML: 5; 2 INJECTION, SUSPENSION INTRAMUSCULAR at 17:39

## 2024-11-23 RX ADMIN — POTASSIUM CHLORIDE 20 MEQ: 750 TABLET, EXTENDED RELEASE ORAL at 19:37

## 2024-11-23 RX ADMIN — Medication 1000 ML: at 17:38

## 2024-11-23 ASSESSMENT — ENCOUNTER SYMPTOMS
COUGH: 0
SORE THROAT: 0
ABDOMINAL PAIN: 0
VOMITING: 0
NAUSEA: 0
EYE PAIN: 0
DIARRHEA: 0
SHORTNESS OF BREATH: 0

## 2024-11-23 ASSESSMENT — PAIN DESCRIPTION - DESCRIPTORS: DESCRIPTORS: ACHING

## 2024-11-23 ASSESSMENT — PAIN DESCRIPTION - ORIENTATION: ORIENTATION: LEFT

## 2024-11-23 ASSESSMENT — PAIN DESCRIPTION - ONSET: ONSET: ON-GOING

## 2024-11-23 ASSESSMENT — PAIN DESCRIPTION - LOCATION: LOCATION: FOOT

## 2024-11-23 ASSESSMENT — PAIN - FUNCTIONAL ASSESSMENT: PAIN_FUNCTIONAL_ASSESSMENT: PREVENTS OR INTERFERES SOME ACTIVE ACTIVITIES AND ADLS

## 2024-11-23 ASSESSMENT — PAIN DESCRIPTION - PAIN TYPE: TYPE: ACUTE PAIN

## 2024-11-23 ASSESSMENT — PAIN SCALES - GENERAL: PAINLEVEL_OUTOF10: 8

## 2024-11-23 ASSESSMENT — PAIN DESCRIPTION - FREQUENCY: FREQUENCY: CONTINUOUS

## 2024-11-23 NOTE — ED PROVIDER NOTES
Department Physician who either signs or Co-signs this chart in the absence of a cardiologist.        RADIOLOGY:   Non-plain film images such as CT, Ultrasound and MRI are read by the radiologist. Plain radiographic images are visualized and preliminarily interpreted by the emergency physician with the below findings:        Interpretation per the Radiologist below, if available at the time of this note:    XR FOOT LEFT (MIN 3 VIEWS)   Final Result   No acute abnormality.      Electronically signed by ALANA MOSS           LABS:  Labs Reviewed   CBC WITH AUTO DIFFERENTIAL - Abnormal; Notable for the following components:       Result Value    RBC 3.51 (*)     Hemoglobin 11.9 (*)     Hematocrit 34.8 (*)     MCV 99.1 (*)     Lymphocytes Absolute 5.4 (*)     All other components within normal limits   COMPREHENSIVE METABOLIC PANEL - Abnormal; Notable for the following components:    Sodium 133 (*)     Potassium 3.3 (*)     CO2 20 (*)     Anion Gap 16 (*)     Glucose 238 (*)     BUN 26 (*)     Creatinine 1.48 (*)     Est, Glom Filt Rate 53 (*)     Alk Phosphatase 36 (*)     All other components within normal limits   EXTRA TUBES HOLD       All other labs were within normal range or not returned as of this dictation.    EMERGENCY DEPARTMENT COURSE and DIFFERENTIAL DIAGNOSIS/MDM:   Vitals:    Vitals:    11/23/24 1715 11/23/24 1800 11/23/24 1930   BP: (!) 71/43 (!) 90/56 (!) 158/82   Pulse: 97  96   Resp: 20  18   Temp: 97.2 °F (36.2 °C)  97.6 °F (36.4 °C)   TempSrc: Temporal     SpO2: 92% 93% 95%   Weight: 98 kg (216 lb 0.8 oz)     Height: 1.727 m (5' 8\")             Medical Decision Making  64-year-old male presents ED with foot laceration.  Physical exam notable for 2 linear lacerations noted to lateral aspect of left foot, one at 8 cm in length and a second at 7 cm in length with no evidence of foreign body. No reduced range of motion.  As I was examining patient in triage room he reports that he is \"not good at  confirmed:  Verbally with patient  Anesthesia:     Anesthesia method:  Local infiltration    Local anesthetic:  Lidocaine 1% w/o epi  Laceration details:     Location:  Foot    Foot location:  Top of L foot    Length (cm):  7  Pre-procedure details:     Preparation:  Patient was prepped and draped in usual sterile fashion  Exploration:     Limited defect created (wound extended): no      Imaging obtained: x-ray      Imaging outcome: foreign body not noted      Wound exploration: wound explored through full range of motion and entire depth of wound visualized    Treatment:     Area cleansed with:  Povidone-iodine and saline    Amount of cleaning:  Extensive    Irrigation solution:  Sterile saline    Irrigation volume:  250 cc    Irrigation method:  Pressure wash  Skin repair:     Repair method:  Sutures    Suture size:  4-0    Suture material:  Prolene    Suture technique:  Simple interrupted    Number of sutures:  13  Approximation:     Approximation:  Close  Repair type:     Repair type:  Intermediate  Post-procedure details:     Dressing:  Non-adherent dressing and antibiotic ointment    Procedure completion:  Tolerated well, no immediate complications        FINAL IMPRESSION      1. Laceration of left foot, initial encounter          DISPOSITION/PLAN   DISPOSITION Decision To Discharge 11/23/2024 07:36:41 PM      PATIENT REFERRED TO:  Amy Goldman, APRN - NP  [address] [facility] [address] [phone]    Schedule an appointment as soon as possible for a visit   As follow up in next week    [facility] EMERGENCY DEP  [address] [place] [address] [phone]  Go to   If symptoms worsen or change      DISCHARGE MEDICATIONS:  Discharge Medication List as of 11/23/2024  7:37 PM            (Please note that portions of this note were completed with a voice recognition program.  Efforts were made to edit the dictations but occasionally words are mis-transcribed.)    TARUN CONWAY (electronically

## 2024-11-23 NOTE — ED TRIAGE NOTES
Pt states she tripped and fell into a shelf with glass and the glass fell onto him breaking. 2 lacerations to left lateral ankle.     Pt BP 70s/40s in triage. Pt states blood is making him lightheaded.

## 2024-11-24 LAB
EKG ATRIAL RATE: 93 BPM
EKG DIAGNOSIS: NORMAL
EKG P AXIS: 63 DEGREES
EKG P-R INTERVAL: 168 MS
EKG Q-T INTERVAL: 424 MS
EKG QRS DURATION: 88 MS
EKG QTC CALCULATION (BAZETT): 527 MS
EKG R AXIS: 36 DEGREES
EKG T AXIS: 44 DEGREES
EKG VENTRICULAR RATE: 93 BPM

## 2024-11-24 NOTE — DISCHARGE INSTRUCTIONS
Return to have sutures removed in 7-10 days. Continue to monitor for signs of infections such as redness, swelling, drainage, or increasing pain. Return if concern for infection. Can use Vaseline or petroleum jelly twice daily on wound.

## 2024-11-25 ENCOUNTER — TELEPHONE (OUTPATIENT)
Age: 64
End: 2024-11-25

## 2024-11-25 NOTE — TELEPHONE ENCOUNTER
----- Message from ANJELICA Loza NP sent at 11/24/2024  1:43 PM EST -----  Regarding: ED follow up & suture removal  Please call patient to schedule follow up with me in 7-10 for suture removal  ----- Message -----  From: Herbert Jones MD  Sent: 11/24/2024   7:58 AM EST  To: ANJELICA Centeno NP

## 2025-02-03 ENCOUNTER — PATIENT MESSAGE (OUTPATIENT)
Age: 65
End: 2025-02-03

## 2025-02-03 DIAGNOSIS — Z00.00 WELLNESS EXAMINATION: Primary | ICD-10-CM

## 2025-02-10 DIAGNOSIS — Z00.00 WELLNESS EXAMINATION: ICD-10-CM

## 2025-02-10 DIAGNOSIS — R93.1 ELEVATED CORONARY ARTERY CALCIUM SCORE: Primary | ICD-10-CM

## 2025-02-10 DIAGNOSIS — E78.1 HYPERTRIGLYCERIDEMIA: ICD-10-CM

## 2025-02-10 LAB
ALBUMIN SERPL-MCNC: 3.8 G/DL (ref 3.5–5)
ALBUMIN/GLOB SERPL: 1.2 (ref 1.1–2.2)
ALP SERPL-CCNC: 43 U/L (ref 45–117)
ALT SERPL-CCNC: 61 U/L (ref 12–78)
ANION GAP SERPL CALC-SCNC: 12 MMOL/L (ref 2–12)
AST SERPL-CCNC: 47 U/L (ref 15–37)
BASOPHILS # BLD: 0.06 K/UL (ref 0–0.1)
BASOPHILS NFR BLD: 1 % (ref 0–1)
BILIRUB SERPL-MCNC: 0.6 MG/DL (ref 0.2–1)
BUN SERPL-MCNC: 32 MG/DL (ref 6–20)
BUN/CREAT SERPL: 29 (ref 12–20)
CALCIUM SERPL-MCNC: 9.5 MG/DL (ref 8.5–10.1)
CHLORIDE SERPL-SCNC: 94 MMOL/L (ref 97–108)
CHOLEST SERPL-MCNC: 291 MG/DL
CO2 SERPL-SCNC: 26 MMOL/L (ref 21–32)
CREAT SERPL-MCNC: 1.11 MG/DL (ref 0.7–1.3)
CREAT UR-MCNC: 33.4 MG/DL
DIFFERENTIAL METHOD BLD: ABNORMAL
EOSINOPHIL # BLD: 0.1 K/UL (ref 0–0.4)
EOSINOPHIL NFR BLD: 1.7 % (ref 0–7)
ERYTHROCYTE [DISTWIDTH] IN BLOOD BY AUTOMATED COUNT: 12.8 % (ref 11.5–14.5)
EST. AVERAGE GLUCOSE BLD GHB EST-MCNC: 166 MG/DL
GLOBULIN SER CALC-MCNC: 3.1 G/DL (ref 2–4)
GLUCOSE SERPL-MCNC: 198 MG/DL (ref 65–100)
HBA1C MFR BLD: 7.4 % (ref 4–5.6)
HCT VFR BLD AUTO: 34.6 % (ref 36.6–50.3)
HDLC SERPL-MCNC: 22 MG/DL
HDLC SERPL: 13.2 (ref 0–5)
HGB BLD-MCNC: 12.2 G/DL (ref 12.1–17)
IMM GRANULOCYTES # BLD AUTO: 0.03 K/UL (ref 0–0.04)
IMM GRANULOCYTES NFR BLD AUTO: 0.5 % (ref 0–0.5)
LDLC SERPL CALC-MCNC: ABNORMAL MG/DL (ref 0–100)
LDLC SERPL DIRECT ASSAY-MCNC: 58 MG/DL (ref 0–100)
LYMPHOCYTES # BLD: 2.84 K/UL (ref 0.8–3.5)
LYMPHOCYTES NFR BLD: 48.5 % (ref 12–49)
MCH RBC QN AUTO: 34.6 PG (ref 26–34)
MCHC RBC AUTO-ENTMCNC: 35.3 G/DL (ref 30–36.5)
MCV RBC AUTO: 98 FL (ref 80–99)
MICROALBUMIN UR-MCNC: 0.8 MG/DL
MICROALBUMIN/CREAT UR-RTO: 24 MG/G (ref 0–30)
MONOCYTES # BLD: 0.53 K/UL (ref 0–1)
MONOCYTES NFR BLD: 9 % (ref 5–13)
NEUTS SEG # BLD: 2.3 K/UL (ref 1.8–8)
NEUTS SEG NFR BLD: 39.3 % (ref 32–75)
NRBC # BLD: 0 K/UL (ref 0–0.01)
NRBC BLD-RTO: 0 PER 100 WBC
PLATELET # BLD AUTO: 160 K/UL (ref 150–400)
PMV BLD AUTO: 11 FL (ref 8.9–12.9)
POTASSIUM SERPL-SCNC: 3.9 MMOL/L (ref 3.5–5.1)
PROT SERPL-MCNC: 6.9 G/DL (ref 6.4–8.2)
PSA SERPL-MCNC: 1.6 NG/ML (ref 0.01–4)
RBC # BLD AUTO: 3.53 M/UL (ref 4.1–5.7)
SODIUM SERPL-SCNC: 132 MMOL/L (ref 136–145)
SPECIMEN HOLD: NORMAL
T4 FREE SERPL-MCNC: 0.7 NG/DL (ref 0.8–1.5)
TRIGL SERPL-MCNC: 3283 MG/DL
TSH SERPL DL<=0.05 MIU/L-ACNC: 1.46 UIU/ML (ref 0.36–3.74)
URATE SERPL-MCNC: 6.9 MG/DL (ref 3.5–7.2)
VLDLC SERPL CALC-MCNC: ABNORMAL MG/DL
WBC # BLD AUTO: 5.9 K/UL (ref 4.1–11.1)

## 2025-02-10 SDOH — ECONOMIC STABILITY: TRANSPORTATION INSECURITY
IN THE PAST 12 MONTHS, HAS THE LACK OF TRANSPORTATION KEPT YOU FROM MEDICAL APPOINTMENTS OR FROM GETTING MEDICATIONS?: NO

## 2025-02-10 SDOH — ECONOMIC STABILITY: FOOD INSECURITY: WITHIN THE PAST 12 MONTHS, YOU WORRIED THAT YOUR FOOD WOULD RUN OUT BEFORE YOU GOT MONEY TO BUY MORE.: NEVER TRUE

## 2025-02-10 SDOH — ECONOMIC STABILITY: INCOME INSECURITY: IN THE LAST 12 MONTHS, WAS THERE A TIME WHEN YOU WERE NOT ABLE TO PAY THE MORTGAGE OR RENT ON TIME?: NO

## 2025-02-10 SDOH — ECONOMIC STABILITY: FOOD INSECURITY: WITHIN THE PAST 12 MONTHS, THE FOOD YOU BOUGHT JUST DIDN'T LAST AND YOU DIDN'T HAVE MONEY TO GET MORE.: NEVER TRUE

## 2025-02-11 NOTE — TELEPHONE ENCOUNTER
PCP: Amy Goldman APRN - NP    Last appt: 7/19/2024     Future Appointments   Date Time Provider Department Center   2/13/2025  1:20 PM Amy Goldman APRN - NP [facility] ECC DEP       Requested Prescriptions     Pending Prescriptions Disp Refills    Icosapent Ethyl (VASCEPA) 1 g CAPS capsule 360 capsule 3         Prior labs and Blood pressures:  BP Readings from Last 3 Encounters:   11/23/24 (!) 158/82   08/30/24 (!) 146/81   07/19/24 117/65     Lab Results   Component Value Date/Time     02/10/2025 10:28 AM    K 3.9 02/10/2025 10:28 AM    CL 94 02/10/2025 10:28 AM    CO2 26 02/10/2025 10:28 AM    BUN 32 02/10/2025 10:28 AM    GFRAA >60 07/11/2022 02:03 PM     Lab Results   Component Value Date/Time    [id number] 6.2 06/14/2023 09:03 AM     Lab Results   Component Value Date/Time    CHOL 291 02/10/2025 10:28 AM    CHOL 131 12/09/2020 10:33 AM    HDL 22 02/10/2025 10:28 AM    LDL Not calculated due to elevated triglyceride level 02/10/2025 10:28 AM    LDL Not calculated due to elevated triglyceride level 03/07/2024 10:34 AM    LDLC 34 12/09/2020 10:33 AM    VLDL  02/10/2025 10:28 AM     Calculation not valid with this patient's other Lipid values.     Lab Results   Component Value Date/Time    TSH 1.46 02/10/2025 10:28 AM

## 2025-02-13 RX ORDER — ICOSAPENT ETHYL 1 G/1
2 CAPSULE ORAL 2 TIMES DAILY
Qty: 360 CAPSULE | Refills: 3 | Status: SHIPPED | OUTPATIENT
Start: 2025-02-13

## 2025-02-21 ENCOUNTER — OFFICE VISIT (OUTPATIENT)
Age: 65
End: 2025-02-21

## 2025-02-21 VITALS
SYSTOLIC BLOOD PRESSURE: 136 MMHG | HEART RATE: 91 BPM | BODY MASS INDEX: 33.19 KG/M2 | DIASTOLIC BLOOD PRESSURE: 78 MMHG | WEIGHT: 219 LBS | TEMPERATURE: 95 F | RESPIRATION RATE: 16 BRPM | HEIGHT: 68 IN | OXYGEN SATURATION: 98 %

## 2025-02-21 DIAGNOSIS — E78.49 FAMILIAL HYPERLIPIDEMIA: ICD-10-CM

## 2025-02-21 DIAGNOSIS — E78.2 MIXED HYPERLIPIDEMIA: ICD-10-CM

## 2025-02-21 DIAGNOSIS — E78.1 HYPERTRIGLYCERIDEMIA: ICD-10-CM

## 2025-02-21 DIAGNOSIS — R93.1 ELEVATED CORONARY ARTERY CALCIUM SCORE: ICD-10-CM

## 2025-02-21 DIAGNOSIS — E11.65 TYPE 2 DIABETES MELLITUS WITH HYPERGLYCEMIA, WITHOUT LONG-TERM CURRENT USE OF INSULIN (HCC): Primary | ICD-10-CM

## 2025-02-21 DIAGNOSIS — I10 ESSENTIAL HYPERTENSION: ICD-10-CM

## 2025-02-21 RX ORDER — FENOFIBRATE 145 MG/1
145 TABLET, COATED ORAL DAILY
Qty: 90 TABLET | Refills: 1 | Status: SHIPPED | OUTPATIENT
Start: 2025-02-21

## 2025-02-21 RX ORDER — ICOSAPENT ETHYL 1 G/1
2 CAPSULE ORAL 2 TIMES DAILY
Qty: 360 CAPSULE | Refills: 3 | Status: SHIPPED | OUTPATIENT
Start: 2025-02-21

## 2025-02-21 RX ORDER — ROSUVASTATIN CALCIUM 40 MG/1
40 TABLET, COATED ORAL DAILY
Qty: 90 TABLET | Refills: 3 | Status: SHIPPED | OUTPATIENT
Start: 2025-02-21

## 2025-02-21 RX ORDER — VALSARTAN 320 MG/1
320 TABLET ORAL DAILY
Qty: 90 TABLET | Refills: 3 | Status: SHIPPED | OUTPATIENT
Start: 2025-02-21

## 2025-02-21 ASSESSMENT — PATIENT HEALTH QUESTIONNAIRE - PHQ9
SUM OF ALL RESPONSES TO PHQ QUESTIONS 1-9: 0
2. FEELING DOWN, DEPRESSED OR HOPELESS: NOT AT ALL
SUM OF ALL RESPONSES TO PHQ QUESTIONS 1-9: 0
1. LITTLE INTEREST OR PLEASURE IN DOING THINGS: NOT AT ALL
SUM OF ALL RESPONSES TO PHQ9 QUESTIONS 1 & 2: 0

## 2025-02-21 NOTE — ASSESSMENT & PLAN NOTE
- Borderline reading today.   - On Valsartan 320mg. No dose adjustment at this time.  - Discussed lifestyle changes.

## 2025-02-21 NOTE — ASSESSMENT & PLAN NOTE
- Reviewed previous score of: 1802.   - Chronically elevated lipid panel, on multiple agents  - Recommended following up w/ Cardiology.   Orders:    Icosapent Ethyl (VASCEPA) 1 g CAPS capsule; Take 2 capsules by mouth 2 times daily    Evolocumab (REPATHA) SOSY syringe; Inject 1 mL into the skin every 14 days

## 2025-02-21 NOTE — PROGRESS NOTES
[facility]  Clinic Note     Brigido Pardo (: 1960) is a 64 y.o. male, established patient, here for evaluation of the following chief complaint(s):  Follow-up       ASSESSMENT/PLAN:    Assessment & Plan  Type 2 diabetes mellitus with hyperglycemia, without long-term current use of insulin (HCC)   New, not at goal (unstable), changes made today: starting medication  - Reviewed and discussed trends. A1c going up. Agreeable at this time to start medication.  - Demo provided on injection sites and use of pen  - Reviewed A1c trends:  2/10/2025 A1c= 7.4  2024 A1c = 6.7  2024 A1c= 6.4  Orders:    Semaglutide,0.25 or 0.5MG/DOS, 2 MG/3ML SOPN; Inject 0.25 mg into the skin once a week    Hypertriglyceridemia  - Chronic, not at goal (unstable). On multiple agents.   - Patient shares today lipid panel was not fasting. Agreeable to recheck.  -Discussed adding Repatha. He is agreement. Recommended follow up w/ Cardiology. Last seen .  -Diet and lifestyle modification encouraged for weight loss and chronic disease prevention/ management  2/10/2025 Triglyerides: 3283 (non fasting)  3/7/2024 Triglycerides: 916  2023 Triglycerides: 732  Orders:    Icosapent Ethyl (VASCEPA) 1 g CAPS capsule; Take 2 capsules by mouth 2 times daily    Evolocumab (REPATHA) SOSY syringe; Inject 1 mL into the skin every 14 days    fenofibrate (TRICOR) 145 MG tablet; Take 1 tablet by mouth daily    rosuvastatin (CRESTOR) 40 MG tablet; Take 1 tablet by mouth daily    Mixed hyperlipidemia   - On multiple agents. Agreeable to start Repatha.   Orders:    Evolocumab (REPATHA) SOSY syringe; Inject 1 mL into the skin every 14 days    Essential hypertension   -  Borderline reading today.   - On Valsartan 320mg. No dose adjustment at this time.  - Discussed lifestyle changes.        Elevated coronary artery calcium score  - Reviewed previous score of: 1802.   - Chronically elevated lipid panel, on multiple 
Chief Complaint   Patient presents with    Follow-up     /78   Pulse 91   Temp (!) 95 °F (35 °C)   Resp 16   Ht 1.727 m (5' 8\")   Wt 99.3 kg (219 lb)   SpO2 98%   BMI 33.30 kg/m²   \"Have you been to the ER, urgent care clinic since your last visit?  Hospitalized since your last visit?\"    NO    “Have you seen or consulted any other health care providers outside our system since your last visit?”    NO      “Have you had a diabetic eye exam?”    NO     No diabetic eye exam on file            
[FreeTextEntry1] : 44  y.o. P 3  LNMP May 12, 2022, presents for annual exam. pt feels well. PMH - GERD - pantoprazole,  PSHx - gastric sleeve  Sept. 2021. lost 35lbs. now takes MVI , Fe2+ , Vit. A, B, and  D abdominoplasty - 2017 . FH - CVA- mother, HTN- mother , Diabetes - father, , Ovarian ca - PGM, , SH - negative, GYN hx - myomata ( small) OB hx - C/S x 3, ROS - clinical specialist at [facility]. \par 3/26/22 - mammo - Birads- 2. \par PCP - AWILDA Silvestre M.D.

## 2025-02-21 NOTE — ASSESSMENT & PLAN NOTE
Orders:    Icosapent Ethyl (VASCEPA) 1 g CAPS capsule; Take 2 capsules by mouth 2 times daily    Evolocumab (REPATHA) SOSY syringe; Inject 1 mL into the skin every 14 days    rosuvastatin (CRESTOR) 40 MG tablet; Take 1 tablet by mouth daily

## 2025-02-21 NOTE — ASSESSMENT & PLAN NOTE
- Chronic, not at goal (unstable). On multiple agents.   - Patient shares today lipid panel was not fasting. Agreeable to recheck.  -Discussed adding Repatha. He is agreement. Recommended follow up w/ Cardiology. Last seen 2023.  -Diet and lifestyle modification encouraged for weight loss and chronic disease prevention/ management  2/10/2025 Triglyerides: 3283 (non fasting)  3/7/2024 Triglycerides: 916  8/8/2023 Triglycerides: 732  Orders:    Icosapent Ethyl (VASCEPA) 1 g CAPS capsule; Take 2 capsules by mouth 2 times daily    Evolocumab (REPATHA) SOSY syringe; Inject 1 mL into the skin every 14 days    fenofibrate (TRICOR) 145 MG tablet; Take 1 tablet by mouth daily    rosuvastatin (CRESTOR) 40 MG tablet; Take 1 tablet by mouth daily

## 2025-02-21 NOTE — ASSESSMENT & PLAN NOTE
New, not at goal (unstable), changes made today: starting medication  - Reviewed and discussed trends. A1c going up. Agreeable at this time to start medication.  - Demo provided on injection sites and use of pen  - Reviewed A1c trends:  2/10/2025 A1c= 7.4  7/19/2024 A1c = 6.7  2/27/2024 A1c= 6.4  Orders:    Semaglutide,0.25 or 0.5MG/DOS, 2 MG/3ML SOPN; Inject 0.25 mg into the skin once a week

## 2025-02-25 ENCOUNTER — CLINICAL DOCUMENTATION (OUTPATIENT)
Facility: HOSPITAL | Age: 65
End: 2025-02-25

## 2025-02-25 NOTE — PROGRESS NOTES
[facility] at [facility]  Specialty Pharmacy Update    Date: 02/25/25    Brigido Pardo 1960     Medication: Ozempic 2 mg/3 ml pen    Prior Authorization: through 2/24/2026    Copay: $0    Corinne McEwen, RPH  [facility] at [address], [facility] [address]   [place] [address]  phone: [phone]   fax: [phone]

## 2025-03-06 RX ORDER — OMEPRAZOLE 20 MG/1
20 CAPSULE, DELAYED RELEASE ORAL DAILY
Qty: 90 CAPSULE | Refills: 0 | Status: SHIPPED | OUTPATIENT
Start: 2025-03-06

## 2025-03-06 NOTE — TELEPHONE ENCOUNTER
PCP: Amy Goldman APRN - NP    Last appt: 2/21/2025     Future Appointments   Date Time Provider Department Center   3/26/2025  3:40 PM Amy Goldman APRN - NP PAFP [facility] ECC DEP       Requested Prescriptions     Pending Prescriptions Disp Refills    omeprazole (PRILOSEC) 20 MG delayed release capsule [Pharmacy Med Name: Omeprazole 20 MG Oral Capsule Delayed Release] 90 capsule 3     Sig: TAKE 1 CAPSULE BY MOUTH DAILY         Prior labs and Blood pressures:  BP Readings from Last 3 Encounters:   02/21/25 136/78   11/23/24 (!) 158/82   08/30/24 (!) 146/81     Lab Results   Component Value Date/Time     02/10/2025 10:28 AM    K 3.9 02/10/2025 10:28 AM    CL 94 02/10/2025 10:28 AM    CO2 26 02/10/2025 10:28 AM    BUN 32 02/10/2025 10:28 AM    GFRAA >60 07/11/2022 02:03 PM     Lab Results   Component Value Date/Time    [id number] 6.2 06/14/2023 09:03 AM     Lab Results   Component Value Date/Time    CHOL 291 02/10/2025 10:28 AM    CHOL 131 12/09/2020 10:33 AM    HDL 22 02/10/2025 10:28 AM    LDL Not calculated due to elevated triglyceride level 02/10/2025 10:28 AM    LDL Not calculated due to elevated triglyceride level 03/07/2024 10:34 AM    LDLC 34 12/09/2020 10:33 AM    VLDL  02/10/2025 10:28 AM     Calculation not valid with this patient's other Lipid values.      Lab Results   Component Value Date/Time    TSH 1.46 02/10/2025 10:28 AM

## 2025-03-25 DIAGNOSIS — E11.65 TYPE 2 DIABETES MELLITUS WITH HYPERGLYCEMIA, WITHOUT LONG-TERM CURRENT USE OF INSULIN (HCC): ICD-10-CM

## 2025-03-25 NOTE — TELEPHONE ENCOUNTER
Refill request for Ozempic. (Patient on 0.25mg x 4 weeks per last rx 2/21/25).    Increase to 0.5mg?      Updated if appropriate to 0.5mg.  Patient has OV tomorrow- Dilma Troncoso    Last appointment: 2/21/25 Jones  Next appointment: 3/26/25 Jones  Previous refill encounter(s): 2/21/25 0.25mg Ozempic 3ml    Requested Prescriptions     Pending Prescriptions Disp Refills    Semaglutide,0.25 or 0.5MG/DOS, 2 MG/3ML SOPN 3 mL 0     Sig: Inject 0.5 mg into the skin once a week     For Pharmacy Admin Tracking Only    Program: Medication Refill  CPA in place:    Recommendation Provided To:   Intervention Detail: New Rx: 1, reason: Patient Preference  Intervention Accepted By:   Gap Closed?:    Time Spent (min): 5

## 2025-04-11 ENCOUNTER — OFFICE VISIT (OUTPATIENT)
Age: 65
End: 2025-04-11

## 2025-04-11 VITALS
DIASTOLIC BLOOD PRESSURE: 65 MMHG | BODY MASS INDEX: 34.34 KG/M2 | HEIGHT: 67 IN | HEART RATE: 65 BPM | TEMPERATURE: 97.8 F | OXYGEN SATURATION: 99 % | SYSTOLIC BLOOD PRESSURE: 108 MMHG | WEIGHT: 218.8 LBS | RESPIRATION RATE: 18 BRPM

## 2025-04-11 DIAGNOSIS — G47.33 OSA (OBSTRUCTIVE SLEEP APNEA): ICD-10-CM

## 2025-04-11 DIAGNOSIS — E78.49 FAMILIAL HYPERLIPIDEMIA: ICD-10-CM

## 2025-04-11 DIAGNOSIS — E78.2 MIXED HYPERLIPIDEMIA: ICD-10-CM

## 2025-04-11 DIAGNOSIS — E78.1 HYPERTRIGLYCERIDEMIA: ICD-10-CM

## 2025-04-11 DIAGNOSIS — R93.1 ELEVATED CORONARY ARTERY CALCIUM SCORE: ICD-10-CM

## 2025-04-11 DIAGNOSIS — I10 ESSENTIAL HYPERTENSION: ICD-10-CM

## 2025-04-11 DIAGNOSIS — E11.65 TYPE 2 DIABETES MELLITUS WITH HYPERGLYCEMIA, WITHOUT LONG-TERM CURRENT USE OF INSULIN (HCC): Primary | ICD-10-CM

## 2025-04-11 NOTE — PROGRESS NOTES
Chief Complaint   Patient presents with    Medication Check     /65 (BP Site: Left Upper Arm, Patient Position: Sitting, BP Cuff Size: Large Adult)   Pulse 65   Temp 97.8 °F (36.6 °C) (Temporal)   Resp 18   Ht 1.702 m (5' 7\")   Wt 99.2 kg (218 lb 12.8 oz)   SpO2 99%   BMI 34.27 kg/m²   Brigido Pardo is a 64 y.o. male here for   Chief Complaint   Patient presents with    Medication Check       1. Have you been to the ER, urgent care clinic since your last visit?  Hospitalized since your last visit? - no    2. Have you seen or consulted any other health care providers outside of the [facility] since your last visit?  Include any pap smears or colon screening.-  Dmitry souza  
reach us or should anything more severe/urgent arise he/she should proceed directly to the nearest emergency department.  Discussed expected course/resolution/complications of diagnosis in detail with patient.  Patient expressed understanding with the diagnosis and plan.     The patient (or guardian, if applicable) and other individuals in attendance with the patient were advised that Artificial Intelligence will be utilized during this visit to record and process the conversation to generate a clinical note. The patient (or guardian, if applicable) and other individuals in attendance at the appointment consented to the use of AI, including the recording.                    An electronic signature was used to authenticate this note.  -- ANJELICA Loza - NP

## 2025-04-11 NOTE — ASSESSMENT & PLAN NOTE
- No CP / SOB  -  On multiple agents to include newly added Repatha, Tricor, Vascepa and rosuvastatin  - BP controlled  - Will update fasting lipid panel at next follow-up  - Overdue to see cardiology.  Plans to schedule appointment.  - CT cardiac calcium score last completed 3/7/2023: Total calcium score = 1802.  Orders:    Evolocumab (REPATHA) SOSY syringe; Inject 1 mL into the skin every 14 days

## 2025-04-11 NOTE — ASSESSMENT & PLAN NOTE
- Doing well on semaglutide.  We discussed up titration schedule.  - Reviewed A1c trends:  2/10/2025 A1c= 7.4  7/19/2024 A1c = 6.7  2/27/2024 A1c= 6.4  - Check A1c level at next follow-up  Orders:    Semaglutide,0.25 or 0.5MG/DOS, 2 MG/3ML SOPN; Inject 0.5 mg into the skin every 7 days    Semaglutide, 1 MG/DOSE, (OZEMPIC) 4 MG/3ML SOPN sc injection; Inject 1 mg into the skin every 7 days    semaglutide, 2 MG/DOSE, (OZEMPIC) 8 MG/3ML SOPN sc injection; Inject 2 mg into the skin every 7 days

## 2025-04-11 NOTE — ASSESSMENT & PLAN NOTE
-On multiple agents to include newly added Repatha, Tricor, Vascepa and rosuvastatin  - Will update fasting lipid panel at next follow-up  Orders:    Evolocumab (REPATHA) SOSY syringe; Inject 1 mL into the skin every 14 days

## 2025-05-02 RX ORDER — OMEPRAZOLE 20 MG/1
20 CAPSULE, DELAYED RELEASE ORAL DAILY
Qty: 90 CAPSULE | Refills: 3 | Status: SHIPPED | OUTPATIENT
Start: 2025-05-02

## 2025-05-02 RX ORDER — VENLAFAXINE HYDROCHLORIDE 75 MG/1
150 CAPSULE, EXTENDED RELEASE ORAL DAILY
Qty: 180 CAPSULE | Refills: 3 | Status: SHIPPED | OUTPATIENT
Start: 2025-05-02

## 2025-05-02 NOTE — TELEPHONE ENCOUNTER
PCP: Amy Goldman APRN - NP    Last appt: 4/11/2025     Future Appointments   Date Time Provider Department Center   5/30/2025  8:00 AM Amy Goldman APRN - NP [facility] DE[facility]       Requested Prescriptions     Pending Prescriptions Disp Refills    omeprazole (PRILOSEC) 20 MG delayed release capsule [Pharmacy Med Name: Omeprazole 20 MG Oral Capsule Delayed Release] 90 capsule 3     Sig: TAKE 1 CAPSULE BY MOUTH DAILY    venlafaxine (EFFEXOR XR) 75 MG extended release capsule [Pharmacy Med Name: Venlafaxine HCl ER 75 MG Oral Capsule Extended Release 24 Hour] 180 capsule 3     Sig: TAKE 2 CAPSULES BY MOUTH DAILY       Prior labs and Blood pressures:  BP Readings from Last 3 Encounters:   04/11/25 108/65   02/21/25 136/78   11/23/24 (!) 158/82     Lab Results   Component Value Date/Time     02/10/2025 10:28 AM    K 3.9 02/10/2025 10:28 AM    CL 94 02/10/2025 10:28 AM    CO2 26 02/10/2025 10:28 AM    BUN 32 02/10/2025 10:28 AM    GFRAA >60 07/11/2022 02:03 PM     Lab Results   Component Value Date/Time    WHP0BOJO 6.2 06/14/2023 09:03 AM     Lab Results   Component Value Date/Time    CHOL 291 02/10/2025 10:28 AM    CHOL 131 12/09/2020 10:33 AM    HDL 22 02/10/2025 10:28 AM    LDL Not calculated due to elevated triglyceride level 02/10/2025 10:28 AM    LDL Not calculated due to elevated triglyceride level 03/07/2024 10:34 AM    LDLC 34 12/09/2020 10:33 AM    VLDL  02/10/2025 10:28 AM     Calculation not valid with this patient's other Lipid values.     No results found for: \"VITD3\"    Lab Results   Component Value Date/Time    TSH 1.46 02/10/2025 10:28 AM

## 2025-07-07 ENCOUNTER — RESULTS FOLLOW-UP (OUTPATIENT)
Age: 65
End: 2025-07-07

## 2025-07-07 ENCOUNTER — OFFICE VISIT (OUTPATIENT)
Age: 65
End: 2025-07-07
Payer: COMMERCIAL

## 2025-07-07 VITALS
SYSTOLIC BLOOD PRESSURE: 112 MMHG | DIASTOLIC BLOOD PRESSURE: 68 MMHG | TEMPERATURE: 97 F | OXYGEN SATURATION: 98 % | WEIGHT: 206 LBS | HEART RATE: 93 BPM | HEIGHT: 67 IN | BODY MASS INDEX: 32.33 KG/M2 | RESPIRATION RATE: 16 BRPM

## 2025-07-07 DIAGNOSIS — E78.49 FAMILIAL HYPERLIPIDEMIA: ICD-10-CM

## 2025-07-07 DIAGNOSIS — F41.9 ANXIETY: ICD-10-CM

## 2025-07-07 DIAGNOSIS — E78.1 HYPERTRIGLYCERIDEMIA: ICD-10-CM

## 2025-07-07 DIAGNOSIS — E78.2 MIXED HYPERLIPIDEMIA: ICD-10-CM

## 2025-07-07 DIAGNOSIS — G47.33 OSA (OBSTRUCTIVE SLEEP APNEA): ICD-10-CM

## 2025-07-07 DIAGNOSIS — I10 ESSENTIAL HYPERTENSION: ICD-10-CM

## 2025-07-07 DIAGNOSIS — E29.1 HYPOGONADISM IN MALE: ICD-10-CM

## 2025-07-07 DIAGNOSIS — Z87.39 HISTORY OF GOUT: ICD-10-CM

## 2025-07-07 DIAGNOSIS — E11.65 TYPE 2 DIABETES MELLITUS WITH HYPERGLYCEMIA, WITHOUT LONG-TERM CURRENT USE OF INSULIN (HCC): Primary | ICD-10-CM

## 2025-07-07 PROBLEM — R73.03 PREDIABETES: Status: RESOLVED | Noted: 2022-04-01 | Resolved: 2025-07-07

## 2025-07-07 LAB — HBA1C MFR BLD: 6.7 %

## 2025-07-07 PROCEDURE — 83036 HEMOGLOBIN GLYCOSYLATED A1C: CPT | Performed by: NURSE PRACTITIONER

## 2025-07-07 PROCEDURE — 3044F HG A1C LEVEL LT 7.0%: CPT | Performed by: NURSE PRACTITIONER

## 2025-07-07 PROCEDURE — 3078F DIAST BP <80 MM HG: CPT | Performed by: NURSE PRACTITIONER

## 2025-07-07 PROCEDURE — 3074F SYST BP LT 130 MM HG: CPT | Performed by: NURSE PRACTITIONER

## 2025-07-07 PROCEDURE — 99214 OFFICE O/P EST MOD 30 MIN: CPT | Performed by: NURSE PRACTITIONER

## 2025-07-07 RX ORDER — VENLAFAXINE HYDROCHLORIDE 75 MG/1
150 CAPSULE, EXTENDED RELEASE ORAL DAILY
Qty: 180 CAPSULE | Refills: 3 | Status: SHIPPED | OUTPATIENT
Start: 2025-07-07

## 2025-07-07 RX ORDER — FENOFIBRATE 145 MG/1
145 TABLET, FILM COATED ORAL DAILY
Qty: 90 TABLET | Refills: 1 | Status: SHIPPED | OUTPATIENT
Start: 2025-07-07

## 2025-07-07 RX ORDER — ICOSAPENT ETHYL 1 G/1
2 CAPSULE ORAL 2 TIMES DAILY
Qty: 360 CAPSULE | Refills: 3 | Status: SHIPPED | OUTPATIENT
Start: 2025-07-07

## 2025-07-07 RX ORDER — ALLOPURINOL 300 MG/1
300 TABLET ORAL DAILY
Qty: 90 TABLET | Refills: 0 | Status: SHIPPED | OUTPATIENT
Start: 2025-07-07

## 2025-07-07 RX ORDER — ROSUVASTATIN CALCIUM 40 MG/1
40 TABLET, COATED ORAL DAILY
Qty: 90 TABLET | Refills: 3 | Status: SHIPPED | OUTPATIENT
Start: 2025-07-07

## 2025-07-07 RX ORDER — VALSARTAN 320 MG/1
320 TABLET ORAL DAILY
Qty: 90 TABLET | Refills: 3 | Status: SHIPPED | OUTPATIENT
Start: 2025-07-07

## 2025-07-07 NOTE — ASSESSMENT & PLAN NOTE
Orders:    Evolocumab (REPATHA) SOSY syringe; Inject 1 mL into the skin every 14 days    Icosapent Ethyl (VASCEPA) 1 g CAPS capsule; Take 2 capsules by mouth 2 times daily

## 2025-07-07 NOTE — PROGRESS NOTES
Chief Complaint   Patient presents with    Follow-up         \"Have you been to the ER, urgent care clinic since your last visit?  Hospitalized since your last visit?\"    NO    “Have you seen or consulted any other health care providers outside of [facility] since your last visit?”    NO          Click Here for Release of Records Request           2/21/2025     3:59 PM   PHQ-9    Little interest or pleasure in doing things 0   Feeling down, depressed, or hopeless 0   PHQ-2 Score 0   PHQ-9 Total Score 0           Financial Resource Strain: Low Risk  (7/16/2024)    Overall Financial Resource Strain (CARDIA)     Difficulty of Paying Living Expenses: Not hard at all      Food Insecurity: Not on file (2/10/2025)          Health Maintenance Due   Topic Date Due    Diabetic retinal exam  Never done    COVID-19 Vaccine (6 - 2024-25 season) 09/01/2024    Hepatitis A vaccine (3 of 3 - Hep A Twinrix risk 3-dose series) 12/19/2024    Diabetic foot exam  07/19/2025

## 2025-07-07 NOTE — ASSESSMENT & PLAN NOTE
Started on semaglutide back in February.    7/7/2025 A1c = 6.7  2/10/2025 A1c= 7.4  7/19/2024 A1c = 6.7  2/27/2024 A1c= 6.4  Orders:    AMB POC HEMOGLOBIN A1C    Comprehensive Metabolic Panel; Future    Lipid Panel; Future    Albumin/Creatinine Ratio, Urine; Future     DIABETES FOOT EXAM    Semaglutide-Weight Management (WEGOVY) 1 MG/0.5ML SOAJ SC injection; Inject 1 mg into the skin every 7 days    semaglutide, 2 MG/DOSE, (OZEMPIC) 8 MG/3ML SOPN sc injection; Inject 2 mg into the skin every 7 days

## 2025-07-07 NOTE — PROGRESS NOTES
[facility]  Clinic Note     Brigido Pardo (: 1960) is a 64 y.o. male, established patient, here for evaluation of the following chief complaint(s):  Follow-up       ASSESSMENT/PLAN:    Assessment & Plan  Type 2 diabetes mellitus with hyperglycemia, without long-term current use of insulin (HCC)  Started on semaglutide back in February.    2025 A1c = 6.7  2/10/2025 A1c= 7.4  2024 A1c = 6.7  2024 A1c= 6.4  Orders:    AMB POC HEMOGLOBIN A1C    Comprehensive Metabolic Panel; Future    Lipid Panel; Future    Albumin/Creatinine Ratio, Urine; Future    HM DIABETES FOOT EXAM    Semaglutide-Weight Management (WEGOVY) 1 MG/0.5ML SOAJ SC injection; Inject 1 mg into the skin every 7 days    semaglutide, 2 MG/DOSE, (OZEMPIC) 8 MG/3ML SOPN sc injection; Inject 2 mg into the skin every 7 days    Anxiety   - Controlled. No dose adjustment at this time.  Orders:    venlafaxine (EFFEXOR XR) 75 MG extended release capsule; Take 2 capsules by mouth daily    Essential hypertension   - Controlled.  No dose adjustment at this time.  Orders:    valsartan (DIOVAN) 320 MG tablet; Take 1 tablet by mouth daily    Hypertriglyceridemia   -Due for updated fasting labs since being Repatha to regimen.  Lab Results   Component Value Date    CHOL 291 (H) 02/10/2025    TRIG 3,283 (H) 02/10/2025    HDL 22 02/10/2025    LDL Not calculated due to elevated triglyceride level 02/10/2025    VLDL  02/10/2025     Calculation not valid with this patient's other Lipid values.    CHOLHDLRATIO 13.2 (H) 02/10/2025   Orders:    Evolocumab (REPATHA) SOSY syringe; Inject 1 mL into the skin every 14 days    fenofibrate (TRICOR) 145 MG tablet; Take 1 tablet by mouth daily    Icosapent Ethyl (VASCEPA) 1 g CAPS capsule; Take 2 capsules by mouth 2 times daily    rosuvastatin (CRESTOR) 40 MG tablet; Take 1 tablet by mouth daily    MALIK (obstructive sleep apnea)   - Compliant.   Orders:    CBC with Auto Differential;

## 2025-07-07 NOTE — ASSESSMENT & PLAN NOTE
- Controlled. No dose adjustment at this time.  Orders:    venlafaxine (EFFEXOR XR) 75 MG extended release capsule; Take 2 capsules by mouth daily

## 2025-07-07 NOTE — ASSESSMENT & PLAN NOTE
- Controlled.  No dose adjustment at this time.  Orders:    valsartan (DIOVAN) 320 MG tablet; Take 1 tablet by mouth daily

## 2025-07-07 NOTE — ASSESSMENT & PLAN NOTE
-Due for updated fasting labs since being Repatha to regimen.  Lab Results   Component Value Date    CHOL 291 (H) 02/10/2025    TRIG 3,283 (H) 02/10/2025    HDL 22 02/10/2025    LDL Not calculated due to elevated triglyceride level 02/10/2025    VLDL  02/10/2025     Calculation not valid with this patient's other Lipid values.    CHOLHDLRATIO 13.2 (H) 02/10/2025   Orders:    Evolocumab (REPATHA) SOSY syringe; Inject 1 mL into the skin every 14 days    fenofibrate (TRICOR) 145 MG tablet; Take 1 tablet by mouth daily    Icosapent Ethyl (VASCEPA) 1 g CAPS capsule; Take 2 capsules by mouth 2 times daily    rosuvastatin (CRESTOR) 40 MG tablet; Take 1 tablet by mouth daily

## 2025-07-16 DIAGNOSIS — G47.33 OSA (OBSTRUCTIVE SLEEP APNEA): ICD-10-CM

## 2025-07-16 DIAGNOSIS — E29.1 HYPOGONADISM IN MALE: ICD-10-CM

## 2025-07-16 DIAGNOSIS — Z87.39 HISTORY OF GOUT: ICD-10-CM

## 2025-07-16 DIAGNOSIS — E11.65 TYPE 2 DIABETES MELLITUS WITH HYPERGLYCEMIA, WITHOUT LONG-TERM CURRENT USE OF INSULIN (HCC): ICD-10-CM

## 2025-07-16 LAB
CREAT UR-MCNC: 111 MG/DL
MICROALBUMIN UR-MCNC: 1.17 MG/DL
MICROALBUMIN/CREAT UR-RTO: 11 MG/G (ref 0–30)
SPECIMEN HOLD: NORMAL

## 2025-07-17 LAB
ALBUMIN SERPL-MCNC: 4 G/DL (ref 3.5–5)
ALBUMIN/GLOB SERPL: 1.5 (ref 1.1–2.2)
ALP SERPL-CCNC: 33 U/L (ref 45–117)
ALT SERPL-CCNC: 35 U/L (ref 12–78)
ANION GAP SERPL CALC-SCNC: 7 MMOL/L (ref 2–12)
AST SERPL-CCNC: 20 U/L (ref 15–37)
BASOPHILS # BLD: 0.03 K/UL (ref 0–0.1)
BASOPHILS NFR BLD: 0.5 % (ref 0–1)
BILIRUB SERPL-MCNC: 0.4 MG/DL (ref 0.2–1)
BUN SERPL-MCNC: 20 MG/DL (ref 6–20)
BUN/CREAT SERPL: 20 (ref 12–20)
CALCIUM SERPL-MCNC: 9.7 MG/DL (ref 8.5–10.1)
CHLORIDE SERPL-SCNC: 106 MMOL/L (ref 97–108)
CHOLEST SERPL-MCNC: 73 MG/DL
CO2 SERPL-SCNC: 26 MMOL/L (ref 21–32)
CREAT SERPL-MCNC: 0.98 MG/DL (ref 0.7–1.3)
DIFFERENTIAL METHOD BLD: ABNORMAL
EOSINOPHIL # BLD: 0.05 K/UL (ref 0–0.4)
EOSINOPHIL NFR BLD: 0.9 % (ref 0–7)
ERYTHROCYTE [DISTWIDTH] IN BLOOD BY AUTOMATED COUNT: 13.2 % (ref 11.5–14.5)
GLOBULIN SER CALC-MCNC: 2.6 G/DL (ref 2–4)
GLUCOSE SERPL-MCNC: 149 MG/DL (ref 65–100)
HCT VFR BLD AUTO: 34.6 % (ref 36.6–50.3)
HDLC SERPL-MCNC: 35 MG/DL
HDLC SERPL: 2.1 (ref 0–5)
HGB BLD-MCNC: 11.4 G/DL (ref 12.1–17)
IMM GRANULOCYTES # BLD AUTO: 0.02 K/UL (ref 0–0.04)
IMM GRANULOCYTES NFR BLD AUTO: 0.4 % (ref 0–0.5)
LDLC SERPL CALC-MCNC: ABNORMAL MG/DL (ref 0–100)
LDLC SERPL DIRECT ASSAY-MCNC: 8 MG/DL (ref 0–100)
LYMPHOCYTES # BLD: 2.54 K/UL (ref 0.8–3.5)
LYMPHOCYTES NFR BLD: 46.4 % (ref 12–49)
MCH RBC QN AUTO: 33.8 PG (ref 26–34)
MCHC RBC AUTO-ENTMCNC: 32.9 G/DL (ref 30–36.5)
MCV RBC AUTO: 102.7 FL (ref 80–99)
MONOCYTES # BLD: 0.49 K/UL (ref 0–1)
MONOCYTES NFR BLD: 9 % (ref 5–13)
NEUTS SEG # BLD: 2.34 K/UL (ref 1.8–8)
NEUTS SEG NFR BLD: 42.8 % (ref 32–75)
NRBC # BLD: 0 K/UL (ref 0–0.01)
NRBC BLD-RTO: 0 PER 100 WBC
PLATELET # BLD AUTO: 129 K/UL (ref 150–400)
PMV BLD AUTO: 11.3 FL (ref 8.9–12.9)
POTASSIUM SERPL-SCNC: 3.6 MMOL/L (ref 3.5–5.1)
PROT SERPL-MCNC: 6.6 G/DL (ref 6.4–8.2)
RBC # BLD AUTO: 3.37 M/UL (ref 4.1–5.7)
SODIUM SERPL-SCNC: 139 MMOL/L (ref 136–145)
TRIGL SERPL-MCNC: 403 MG/DL
URATE SERPL-MCNC: 3.7 MG/DL (ref 3.5–7.2)
VLDLC SERPL CALC-MCNC: ABNORMAL MG/DL
WBC # BLD AUTO: 5.5 K/UL (ref 4.1–11.1)

## 2025-07-18 LAB
TESTOST FREE SERPL-MCNC: 6.4 PG/ML (ref 6.6–18.1)
TESTOST SERPL-MCNC: 102 NG/DL (ref 264–916)

## (undated) DEVICE — PENCIL ES CRD L10FT HND SWCHING ROCK SWCH W/ EDGE COAT BLDE

## (undated) DEVICE — TOWEL SURG W17XL27IN STD BLU COT NONFENESTRATED PREWASHED

## (undated) DEVICE — SYRINGE MED 10ML LUERLOCK TIP W/O SFTY DISP

## (undated) DEVICE — KIT,1200CC CANISTER,3/16"X6' TUBING: Brand: MEDLINE INDUSTRIES, INC.

## (undated) DEVICE — BASIN ST MAJOR-NO CAUTERY: Brand: MEDLINE INDUSTRIES, INC.

## (undated) DEVICE — SUTURE SZ 0 27IN 5/8 CIR UR-6  TAPER PT VIOLET ABSRB VICRYL J603H

## (undated) DEVICE — SUTURE VCRL + SZ 2-0 L27IN ABSRB WHT SH 1/2 CIR TAPERCUT VCP417H

## (undated) DEVICE — SUTURE MCRYL + SZ 4-0 L27IN ABSRB UD L19MM PS-2 3/8 CIR MCP426H

## (undated) DEVICE — LIQUIBAND RAPID ADHESIVE 36/CS 0.8ML: Brand: MEDLINE

## (undated) DEVICE — APPLICATOR MEDICATED 26 CC SOLUTION HI LT ORNG CHLORAPREP

## (undated) DEVICE — HYPODERMIC SAFETY NEEDLE: Brand: MONOJECT